# Patient Record
Sex: FEMALE | Race: WHITE | NOT HISPANIC OR LATINO | Employment: OTHER | URBAN - METROPOLITAN AREA
[De-identification: names, ages, dates, MRNs, and addresses within clinical notes are randomized per-mention and may not be internally consistent; named-entity substitution may affect disease eponyms.]

---

## 2023-03-23 LAB
ALBUMIN (G/DL) IN SER/PLAS: 4.3 G/DL (ref 3.4–5)
ANION GAP IN SER/PLAS: 15 MMOL/L (ref 10–20)
CALCIUM (MG/DL) IN SER/PLAS: 9.8 MG/DL (ref 8.6–10.3)
CARBON DIOXIDE, TOTAL (MMOL/L) IN SER/PLAS: 23 MMOL/L (ref 21–32)
CHLORIDE (MMOL/L) IN SER/PLAS: 106 MMOL/L (ref 98–107)
CREATININE (MG/DL) IN SER/PLAS: 1.13 MG/DL (ref 0.5–1.05)
GFR FEMALE: 51 ML/MIN/1.73M2
GLUCOSE (MG/DL) IN SER/PLAS: 103 MG/DL (ref 74–99)
PHOSPHATE (MG/DL) IN SER/PLAS: 4 MG/DL (ref 2.5–4.9)
POTASSIUM (MMOL/L) IN SER/PLAS: 4.2 MMOL/L (ref 3.5–5.3)
SODIUM (MMOL/L) IN SER/PLAS: 140 MMOL/L (ref 136–145)
UREA NITROGEN (MG/DL) IN SER/PLAS: 32 MG/DL (ref 6–23)

## 2023-04-12 ENCOUNTER — APPOINTMENT (OUTPATIENT)
Dept: PRIMARY CARE | Facility: CLINIC | Age: 73
End: 2023-04-12
Payer: MEDICARE

## 2023-04-12 PROBLEM — R06.02 SHORTNESS OF BREATH: Status: ACTIVE | Noted: 2023-04-12

## 2023-04-12 PROBLEM — M19.042 OSTEOARTHRITIS OF BOTH HANDS: Status: ACTIVE | Noted: 2023-04-12

## 2023-04-12 PROBLEM — M79.89 LEFT LEG SWELLING: Status: ACTIVE | Noted: 2023-04-12

## 2023-04-12 PROBLEM — R06.09 DYSPNEA ON EXERTION: Status: ACTIVE | Noted: 2023-04-12

## 2023-04-12 PROBLEM — E78.5 HYPERLIPIDEMIA: Status: ACTIVE | Noted: 2023-04-12

## 2023-04-12 PROBLEM — G47.33 OSA (OBSTRUCTIVE SLEEP APNEA): Status: ACTIVE | Noted: 2023-04-12

## 2023-04-12 PROBLEM — M16.11 PRIMARY OSTEOARTHRITIS OF RIGHT HIP: Status: ACTIVE | Noted: 2023-04-12

## 2023-04-12 PROBLEM — M25.559 JOINT PAIN, HIP: Status: ACTIVE | Noted: 2023-04-12

## 2023-04-12 PROBLEM — M85.80 OSTEOPENIA: Status: RESOLVED | Noted: 2023-04-12 | Resolved: 2023-04-12

## 2023-04-12 PROBLEM — R10.2 FEMALE PELVIC PAIN: Status: ACTIVE | Noted: 2023-04-12

## 2023-04-12 PROBLEM — N18.31 CHRONIC KIDNEY DISEASE, STAGE 3A (MULTI): Status: ACTIVE | Noted: 2023-04-12

## 2023-04-12 PROBLEM — H90.3 BILATERAL SENSORINEURAL HEARING LOSS: Status: ACTIVE | Noted: 2023-04-12

## 2023-04-12 PROBLEM — M35.00 SICCA SYNDROME (MULTI): Status: ACTIVE | Noted: 2023-04-12

## 2023-04-12 PROBLEM — I72.8 ANEURYSM OF SPLENIC ARTERY (CMS-HCC): Status: ACTIVE | Noted: 2023-04-12

## 2023-04-12 PROBLEM — E79.0 HYPERURICEMIA: Status: ACTIVE | Noted: 2023-04-12

## 2023-04-12 PROBLEM — M19.041 OSTEOARTHRITIS OF BOTH HANDS: Status: ACTIVE | Noted: 2023-04-12

## 2023-04-12 PROBLEM — I10 HYPERTENSION: Status: ACTIVE | Noted: 2023-04-12

## 2023-04-12 PROBLEM — R39.9 URINARY SYMPTOM OR SIGN: Status: ACTIVE | Noted: 2023-04-12

## 2023-04-12 PROBLEM — R74.01 ELEVATED TRANSAMINASE LEVEL: Status: ACTIVE | Noted: 2023-04-12

## 2023-04-12 PROBLEM — T78.40XA ALLERGIC: Status: ACTIVE | Noted: 2023-04-12

## 2023-04-12 PROBLEM — L82.1 SEBORRHEIC KERATOSES: Status: ACTIVE | Noted: 2023-04-12

## 2023-04-12 PROBLEM — R90.89 ABNORMAL FINDING ON MRI OF BRAIN: Status: ACTIVE | Noted: 2023-04-12

## 2023-04-12 PROBLEM — K22.2 ESOPHAGEAL STRICTURE: Status: ACTIVE | Noted: 2023-04-12

## 2023-04-12 PROBLEM — N89.8 VAGINAL IRRITATION: Status: ACTIVE | Noted: 2023-04-12

## 2023-04-12 PROBLEM — N95.2 POSTMENOPAUSAL ATROPHIC VAGINITIS: Status: ACTIVE | Noted: 2023-04-12

## 2023-04-12 PROBLEM — M41.9 SCOLIOSIS: Status: ACTIVE | Noted: 2023-04-12

## 2023-04-12 PROBLEM — R32 URINARY INCONTINENCE: Status: ACTIVE | Noted: 2023-04-12

## 2023-04-12 PROBLEM — M46.1 SACROILIITIS (CMS-HCC): Status: ACTIVE | Noted: 2023-04-12

## 2023-04-12 PROBLEM — R23.3 PETECHIAL RASH: Status: ACTIVE | Noted: 2023-04-12

## 2023-04-12 PROBLEM — D17.9 LIPOMA: Status: ACTIVE | Noted: 2023-04-12

## 2023-04-12 PROBLEM — J90 PLEURAL EFFUSION: Status: ACTIVE | Noted: 2023-04-12

## 2023-04-12 PROBLEM — J45.909 ASTHMA (HHS-HCC): Status: ACTIVE | Noted: 2023-04-12

## 2023-04-12 PROBLEM — J32.9 SINUSITIS: Status: ACTIVE | Noted: 2023-04-12

## 2023-04-12 PROBLEM — I89.0 LYMPHEDEMA OF EXTREMITY: Status: ACTIVE | Noted: 2023-04-12

## 2023-04-12 PROBLEM — M79.671 PAIN IN BOTH FEET: Status: ACTIVE | Noted: 2023-04-12

## 2023-04-12 PROBLEM — M79.672 PAIN IN BOTH FEET: Status: ACTIVE | Noted: 2023-04-12

## 2023-04-12 PROBLEM — I72.2 RENAL ARTERY ANEURYSM (CMS-HCC): Status: ACTIVE | Noted: 2023-04-12
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
## 2023-04-12 PROBLEM — H69.93 DYSFUNCTION OF BOTH EUSTACHIAN TUBES: Status: ACTIVE | Noted: 2023-04-12

## 2023-04-12 PROBLEM — D69.6 THROMBOCYTOPENIA (CMS-HCC): Status: ACTIVE | Noted: 2023-04-12

## 2023-04-12 PROBLEM — E55.9 MILD VITAMIN D DEFICIENCY: Status: ACTIVE | Noted: 2023-04-12

## 2023-04-12 PROBLEM — M79.606 LEG PAIN: Status: ACTIVE | Noted: 2023-04-12

## 2023-04-12 PROBLEM — L29.9 ITCHING: Status: ACTIVE | Noted: 2023-04-12

## 2023-04-12 PROBLEM — R68.2 DRY MOUTH, UNSPECIFIED: Status: ACTIVE | Noted: 2023-04-12

## 2023-04-12 PROBLEM — R42 DIZZINESS AND GIDDINESS: Status: ACTIVE | Noted: 2023-04-12

## 2023-04-12 RX ORDER — LOSARTAN POTASSIUM 50 MG/1
1 TABLET ORAL DAILY
COMMUNITY
Start: 2022-06-12 | End: 2023-05-03

## 2023-04-12 RX ORDER — AMLODIPINE BESYLATE 5 MG/1
1 TABLET ORAL DAILY
COMMUNITY
Start: 2018-09-18 | End: 2023-11-15

## 2023-04-12 RX ORDER — CYCLOSPORINE 0.5 MG/ML
1 EMULSION OPHTHALMIC EVERY 12 HOURS
COMMUNITY
Start: 2018-09-24

## 2023-04-12 RX ORDER — ATENOLOL AND CHLORTHALIDONE TABLET 100; 25 MG/1; MG/1
1 TABLET ORAL DAILY
COMMUNITY
Start: 2018-09-18 | End: 2023-08-15

## 2023-04-12 RX ORDER — ACETAMINOPHEN 500 MG
1 TABLET ORAL DAILY
COMMUNITY
Start: 2013-06-28

## 2023-04-12 RX ORDER — FLUTICASONE PROPIONATE 50 MCG
SPRAY, SUSPENSION (ML) NASAL DAILY
COMMUNITY
Start: 2019-04-30 | End: 2023-05-22 | Stop reason: ALTCHOICE

## 2023-04-12 RX ORDER — UBIDECARENONE 30 MG
1 CAPSULE ORAL DAILY
COMMUNITY
Start: 2018-09-18

## 2023-04-12 RX ORDER — MONTELUKAST SODIUM 10 MG/1
1 TABLET ORAL NIGHTLY
COMMUNITY
Start: 2020-06-25 | End: 2024-02-12

## 2023-04-12 RX ORDER — GLUCOSAMINE/CHONDR SU A SOD 750-600 MG
TABLET ORAL
COMMUNITY
Start: 2018-09-18

## 2023-04-12 RX ORDER — ALLOPURINOL 100 MG/1
100 TABLET ORAL DAILY
COMMUNITY
End: 2023-12-19

## 2023-04-12 RX ORDER — ASPIRIN 81 MG/1
1 TABLET ORAL DAILY
COMMUNITY
Start: 2018-09-18

## 2023-04-19 ENCOUNTER — APPOINTMENT (OUTPATIENT)
Dept: PRIMARY CARE | Facility: CLINIC | Age: 73
End: 2023-04-19
Payer: MEDICARE

## 2023-05-02 DIAGNOSIS — I10 HYPERTENSION, UNSPECIFIED TYPE: ICD-10-CM

## 2023-05-03 RX ORDER — LOSARTAN POTASSIUM 50 MG/1
TABLET ORAL
Qty: 90 TABLET | Refills: 1 | Status: SHIPPED | OUTPATIENT
Start: 2023-05-03 | End: 2023-09-27 | Stop reason: ALTCHOICE

## 2023-05-03 NOTE — TELEPHONE ENCOUNTER
Requested Prescriptions     Pending Prescriptions Disp Refills    losartan (Cozaar) 50 mg tablet [Pharmacy Med Name: Losartan Potassium Oral Tablet 50 MG] 90 tablet 1     Sig: TAKE ONE TABLET BY MOUTH EVERY DAY

## 2023-05-22 ENCOUNTER — OFFICE VISIT (OUTPATIENT)
Dept: PRIMARY CARE | Facility: CLINIC | Age: 73
End: 2023-05-22
Payer: MEDICARE

## 2023-05-22 VITALS
BODY MASS INDEX: 39.21 KG/M2 | HEART RATE: 69 BPM | SYSTOLIC BLOOD PRESSURE: 139 MMHG | HEIGHT: 66 IN | WEIGHT: 244 LBS | DIASTOLIC BLOOD PRESSURE: 82 MMHG | OXYGEN SATURATION: 94 %

## 2023-05-22 DIAGNOSIS — I10 PRIMARY HYPERTENSION: ICD-10-CM

## 2023-05-22 DIAGNOSIS — R07.81 PLEURITIC CHEST PAIN: ICD-10-CM

## 2023-05-22 DIAGNOSIS — E78.5 HYPERLIPIDEMIA, UNSPECIFIED HYPERLIPIDEMIA TYPE: ICD-10-CM

## 2023-05-22 DIAGNOSIS — Z00.00 ROUTINE GENERAL MEDICAL EXAMINATION AT HEALTH CARE FACILITY: Primary | ICD-10-CM

## 2023-05-22 DIAGNOSIS — Z12.31 ENCOUNTER FOR SCREENING MAMMOGRAM FOR MALIGNANT NEOPLASM OF BREAST: ICD-10-CM

## 2023-05-22 DIAGNOSIS — J90 PLEURAL EFFUSION: ICD-10-CM

## 2023-05-22 PROBLEM — M19.011 PRIMARY OSTEOARTHRITIS, RIGHT SHOULDER: Status: ACTIVE | Noted: 2023-05-22

## 2023-05-22 PROBLEM — G56.02 CARPAL TUNNEL SYNDROME, LEFT UPPER LIMB: Status: ACTIVE | Noted: 2023-05-22

## 2023-05-22 PROCEDURE — 99214 OFFICE O/P EST MOD 30 MIN: CPT | Performed by: INTERNAL MEDICINE

## 2023-05-22 PROCEDURE — G0439 PPPS, SUBSEQ VISIT: HCPCS | Performed by: INTERNAL MEDICINE

## 2023-05-22 PROCEDURE — 1036F TOBACCO NON-USER: CPT | Performed by: INTERNAL MEDICINE

## 2023-05-22 PROCEDURE — 1170F FXNL STATUS ASSESSED: CPT | Performed by: INTERNAL MEDICINE

## 2023-05-22 PROCEDURE — 1159F MED LIST DOCD IN RCRD: CPT | Performed by: INTERNAL MEDICINE

## 2023-05-22 PROCEDURE — 1160F RVW MEDS BY RX/DR IN RCRD: CPT | Performed by: INTERNAL MEDICINE

## 2023-05-22 PROCEDURE — 3079F DIAST BP 80-89 MM HG: CPT | Performed by: INTERNAL MEDICINE

## 2023-05-22 PROCEDURE — 3075F SYST BP GE 130 - 139MM HG: CPT | Performed by: INTERNAL MEDICINE

## 2023-05-22 RX ORDER — NEBULIZER AND COMPRESSOR
1 EACH MISCELLANEOUS 2 TIMES DAILY
Qty: 1 EACH | Refills: 0 | Status: SHIPPED | OUTPATIENT
Start: 2023-05-22

## 2023-05-22 RX ORDER — PREDNISONE 20 MG/1
20 TABLET ORAL 2 TIMES DAILY
Qty: 10 TABLET | Refills: 0 | Status: SHIPPED | OUTPATIENT
Start: 2023-05-22 | End: 2023-05-27

## 2023-05-22 RX ORDER — PREDNISONE 20 MG/1
20 TABLET ORAL 2 TIMES DAILY
Qty: 10 TABLET | Refills: 0 | Status: SHIPPED | OUTPATIENT
Start: 2023-05-22 | End: 2023-05-22 | Stop reason: SDUPTHER

## 2023-05-22 ASSESSMENT — ACTIVITIES OF DAILY LIVING (ADL)
TAKING_MEDICATION: INDEPENDENT
MANAGING_FINANCES: INDEPENDENT
BATHING: INDEPENDENT
DRESSING: INDEPENDENT
GROCERY_SHOPPING: INDEPENDENT
DOING_HOUSEWORK: INDEPENDENT

## 2023-05-22 ASSESSMENT — PATIENT HEALTH QUESTIONNAIRE - PHQ9
1. LITTLE INTEREST OR PLEASURE IN DOING THINGS: NOT AT ALL
SUM OF ALL RESPONSES TO PHQ9 QUESTIONS 1 AND 2: 0
2. FEELING DOWN, DEPRESSED OR HOPELESS: NOT AT ALL

## 2023-05-22 NOTE — PROGRESS NOTES
"Subjective   Patient ID: Patty Johnson is a 73 y.o. female who presents for Medicare Annual Wellness Visit Subsequent. Concerns with pain on left side     HPI     Has pain in left chest, back, shoulder since Friday. Worse taking a deep breath. No relieving factors. No injury, rash or bruising.     Denies any other new issues     Review of Systems   All other systems reviewed and are negative.      Objective   /82   Pulse 69   Ht 1.664 m (5' 5.5\")   Wt 111 kg (244 lb)   SpO2 94%   BMI 39.99 kg/m²     Physical Exam  Constitutional:       Appearance: Normal appearance.   HENT:      Head: Normocephalic and atraumatic.   Cardiovascular:      Rate and Rhythm: Normal rate and regular rhythm.      Heart sounds: Normal heart sounds. No murmur heard.     No gallop.   Pulmonary:      Effort: Pulmonary effort is normal. No respiratory distress.      Breath sounds: No wheezing or rales.   Skin:     General: Skin is warm and dry.      Findings: No rash.   Neurological:      Mental Status: She is alert and oriented to person, place, and time. Mental status is at baseline.   Psychiatric:         Mood and Affect: Mood normal.         Behavior: Behavior normal.         Assessment/Plan       #left thoracic pain   -?pleurisy. Order CXR and will rx prednisone 20mg BID x 5 days      #HTN  -Acceptable here. BP is high at home, she has been using a wrist cuff. Recommend she get an upper arm cuff and call me with readings. Cont losartan amlodipine, atenolol and chlorthalidone     #CKD 3  -Following with nephrology.     #Urinary incontinence   -Following with Dr. Vaughan      #Asthma  -Cont singular. Follows with pulm      #HLD  -Cont simvastatin, order lipid panel      #CELIA  -Cont CPAP        Mamm: 4/26/22  CRC: FIT 8/3/21 - cscope ~2-3 years ago   DEXA:10/19/20     RTC 6 mo        "

## 2023-05-22 NOTE — PATIENT INSTRUCTIONS
Go get xray and take prednisone     Get lipids checked at your leisure. Please complete fasting blood work. Fast for 10 hours, black coffee and water the morning of labs are OK.     If you can get upper arm cuff.     Get mamm [phone]    Come back in 6 months

## 2023-05-24 DIAGNOSIS — J90 PLEURAL EFFUSION: Primary | ICD-10-CM

## 2023-06-19 ENCOUNTER — LAB (OUTPATIENT)
Dept: LAB | Facility: LAB | Age: 73
End: 2023-06-19
Payer: MEDICARE

## 2023-06-19 DIAGNOSIS — E78.5 HYPERLIPIDEMIA, UNSPECIFIED HYPERLIPIDEMIA TYPE: ICD-10-CM

## 2023-06-19 LAB
CHOLESTEROL (MG/DL) IN SER/PLAS: 132 MG/DL (ref 0–199)
CHOLESTEROL IN HDL (MG/DL) IN SER/PLAS: 36 MG/DL
CHOLESTEROL/HDL RATIO: 3.7
LDL: 66 MG/DL (ref 0–99)
TRIGLYCERIDE (MG/DL) IN SER/PLAS: 148 MG/DL (ref 0–149)
VLDL: 30 MG/DL (ref 0–40)

## 2023-06-19 PROCEDURE — 80061 LIPID PANEL: CPT

## 2023-06-19 PROCEDURE — 36415 COLL VENOUS BLD VENIPUNCTURE: CPT

## 2023-06-21 ENCOUNTER — TELEPHONE (OUTPATIENT)
Dept: PRIMARY CARE | Facility: CLINIC | Age: 73
End: 2023-06-21
Payer: MEDICARE

## 2023-06-21 DIAGNOSIS — I72.8 SPLENIC ARTERY ANEURYSM (CMS-HCC): Primary | ICD-10-CM

## 2023-08-06 DIAGNOSIS — E78.5 HYPERLIPIDEMIA, UNSPECIFIED HYPERLIPIDEMIA TYPE: ICD-10-CM

## 2023-08-06 DIAGNOSIS — K21.9 GASTROESOPHAGEAL REFLUX DISEASE, UNSPECIFIED WHETHER ESOPHAGITIS PRESENT: ICD-10-CM

## 2023-08-07 RX ORDER — SIMVASTATIN 20 MG/1
TABLET, FILM COATED ORAL
Qty: 90 TABLET | Refills: 3 | Status: SHIPPED | OUTPATIENT
Start: 2023-08-07 | End: 2024-02-09

## 2023-08-07 RX ORDER — OMEPRAZOLE 40 MG/1
CAPSULE, DELAYED RELEASE ORAL
Qty: 180 CAPSULE | Refills: 3 | Status: SHIPPED | OUTPATIENT
Start: 2023-08-07 | End: 2024-02-09

## 2023-08-07 NOTE — TELEPHONE ENCOUNTER
Requested Prescriptions     Pending Prescriptions Disp Refills    simvastatin (Zocor) 20 mg tablet [Pharmacy Med Name: Simvastatin 20 MG Oral Tablet] 90 tablet 3     Sig: TAKE 1 TABLET BY MOUTH IN THE  EVENING    omeprazole (PriLOSEC) 40 mg DR capsule [Pharmacy Med Name: Omeprazole 40 MG Oral Capsule Delayed Release] 180 capsule 3     Sig: TAKE 1 CAPSULE BY MOUTH TWICE  DAILY

## 2023-08-13 DIAGNOSIS — I10 PRIMARY HYPERTENSION: Primary | ICD-10-CM

## 2023-08-15 RX ORDER — ATENOLOL AND CHLORTHALIDONE TABLET 100; 25 MG/1; MG/1
1 TABLET ORAL DAILY
Qty: 100 TABLET | Refills: 2 | Status: SHIPPED | OUTPATIENT
Start: 2023-08-15 | End: 2024-02-09

## 2023-08-16 DIAGNOSIS — N28.89 RIGHT RENAL MASS: Primary | ICD-10-CM

## 2023-08-23 ENCOUNTER — TELEPHONE (OUTPATIENT)
Dept: PRIMARY CARE | Facility: CLINIC | Age: 73
End: 2023-08-23
Payer: MEDICARE

## 2023-08-24 ENCOUNTER — APPOINTMENT (OUTPATIENT)
Dept: PRIMARY CARE | Facility: CLINIC | Age: 73
End: 2023-08-24
Payer: MEDICARE

## 2023-08-28 ENCOUNTER — HOSPITAL ENCOUNTER (OUTPATIENT)
Dept: DATA CONVERSION | Facility: HOSPITAL | Age: 73
End: 2023-08-29
Attending: ORTHOPAEDIC SURGERY | Admitting: ORTHOPAEDIC SURGERY
Payer: MEDICARE

## 2023-08-28 DIAGNOSIS — G47.33 OBSTRUCTIVE SLEEP APNEA (ADULT) (PEDIATRIC): ICD-10-CM

## 2023-08-28 DIAGNOSIS — I12.9 HYPERTENSIVE CHRONIC KIDNEY DISEASE WITH STAGE 1 THROUGH STAGE 4 CHRONIC KIDNEY DISEASE, OR UNSPECIFIED CHRONIC KIDNEY DISEASE: ICD-10-CM

## 2023-08-28 DIAGNOSIS — N18.30 CHRONIC KIDNEY DISEASE, STAGE 3 UNSPECIFIED (MULTI): ICD-10-CM

## 2023-08-28 DIAGNOSIS — J45.909 UNSPECIFIED ASTHMA, UNCOMPLICATED (HHS-HCC): ICD-10-CM

## 2023-08-28 DIAGNOSIS — M16.12 UNILATERAL PRIMARY OSTEOARTHRITIS, LEFT HIP: ICD-10-CM

## 2023-08-29 LAB
ANION GAP IN SER/PLAS: 12 MMOL/L (ref 10–20)
CALCIUM (MG/DL) IN SER/PLAS: 8.7 MG/DL (ref 8.6–10.3)
CARBON DIOXIDE, TOTAL (MMOL/L) IN SER/PLAS: 22 MMOL/L (ref 21–32)
CHLORIDE (MMOL/L) IN SER/PLAS: 103 MMOL/L (ref 98–107)
CREATININE (MG/DL) IN SER/PLAS: 1.07 MG/DL (ref 0.5–1.05)
ERYTHROCYTE DISTRIBUTION WIDTH (RATIO) BY AUTOMATED COUNT: 13.2 % (ref 11.5–14.5)
ERYTHROCYTE MEAN CORPUSCULAR HEMOGLOBIN CONCENTRATION (G/DL) BY AUTOMATED: 33.4 G/DL (ref 32–36)
ERYTHROCYTE MEAN CORPUSCULAR VOLUME (FL) BY AUTOMATED COUNT: 99 FL (ref 80–100)
ERYTHROCYTES (10*6/UL) IN BLOOD BY AUTOMATED COUNT: 3.07 X10E12/L (ref 4–5.2)
GFR FEMALE: 54 ML/MIN/1.73M2
GLUCOSE (MG/DL) IN SER/PLAS: 121 MG/DL (ref 74–99)
HEMATOCRIT (%) IN BLOOD BY AUTOMATED COUNT: 30.5 % (ref 36–46)
HEMOGLOBIN (G/DL) IN BLOOD: 10.2 G/DL (ref 12–16)
LEUKOCYTES (10*3/UL) IN BLOOD BY AUTOMATED COUNT: 5.4 X10E9/L (ref 4.4–11.3)
PLATELETS (10*3/UL) IN BLOOD AUTOMATED COUNT: 106 X10E9/L (ref 150–450)
POTASSIUM (MMOL/L) IN SER/PLAS: 3.7 MMOL/L (ref 3.5–5.3)
SODIUM (MMOL/L) IN SER/PLAS: 133 MMOL/L (ref 136–145)
UREA NITROGEN (MG/DL) IN SER/PLAS: 26 MG/DL (ref 6–23)

## 2023-08-30 ENCOUNTER — NURSING HOME VISIT (OUTPATIENT)
Dept: POST ACUTE CARE | Facility: EXTERNAL LOCATION | Age: 73
End: 2023-08-30
Payer: MEDICARE

## 2023-08-30 DIAGNOSIS — I10 PRIMARY HYPERTENSION: ICD-10-CM

## 2023-08-30 DIAGNOSIS — M16.12 PRIMARY OSTEOARTHRITIS OF LEFT HIP: Primary | ICD-10-CM

## 2023-08-30 DIAGNOSIS — E78.5 HYPERLIPIDEMIA, UNSPECIFIED HYPERLIPIDEMIA TYPE: ICD-10-CM

## 2023-08-30 DIAGNOSIS — K59.00 CONSTIPATION, UNSPECIFIED CONSTIPATION TYPE: ICD-10-CM

## 2023-08-30 DIAGNOSIS — M35.00 SICCA SYNDROME (MULTI): ICD-10-CM

## 2023-08-30 DIAGNOSIS — J45.909 ASTHMA, UNSPECIFIED ASTHMA SEVERITY, UNSPECIFIED WHETHER COMPLICATED, UNSPECIFIED WHETHER PERSISTENT (HHS-HCC): ICD-10-CM

## 2023-08-30 DIAGNOSIS — J31.0 RHINITIS, UNSPECIFIED TYPE: ICD-10-CM

## 2023-08-30 DIAGNOSIS — E56.9 VITAMIN DEFICIENCY: ICD-10-CM

## 2023-08-30 DIAGNOSIS — R11.0 NAUSEA: ICD-10-CM

## 2023-08-30 LAB
ALANINE AMINOTRANSFERASE (SGPT) (U/L) IN SER/PLAS: 14 U/L (ref 7–45)
ALBUMIN (G/DL) IN SER/PLAS: 3.6 G/DL (ref 3.4–5)
ALKALINE PHOSPHATASE (U/L) IN SER/PLAS: 50 U/L (ref 33–136)
ANION GAP IN SER/PLAS: 14 MMOL/L (ref 10–20)
ASPARTATE AMINOTRANSFERASE (SGOT) (U/L) IN SER/PLAS: 26 U/L (ref 9–39)
BASOPHILS (10*3/UL) IN BLOOD BY AUTOMATED COUNT: 0.03 X10E9/L (ref 0–0.1)
BASOPHILS/100 LEUKOCYTES IN BLOOD BY AUTOMATED COUNT: 0.6 % (ref 0–2)
BILIRUBIN TOTAL (MG/DL) IN SER/PLAS: 0.8 MG/DL (ref 0–1.2)
CALCIUM (MG/DL) IN SER/PLAS: 8.7 MG/DL (ref 8.6–10.3)
CARBON DIOXIDE, TOTAL (MMOL/L) IN SER/PLAS: 22 MMOL/L (ref 21–32)
CHLORIDE (MMOL/L) IN SER/PLAS: 103 MMOL/L (ref 98–107)
CREATININE (MG/DL) IN SER/PLAS: 1.33 MG/DL (ref 0.5–1.05)
DACROCYTES PRESENCE IN BLOOD BY LIGHT MICROSCOPY: NORMAL
EOSINOPHILS (10*3/UL) IN BLOOD BY AUTOMATED COUNT: 0.29 X10E9/L (ref 0–0.4)
EOSINOPHILS/100 LEUKOCYTES IN BLOOD BY AUTOMATED COUNT: 6 % (ref 0–6)
ERYTHROCYTE DISTRIBUTION WIDTH (RATIO) BY AUTOMATED COUNT: 13.2 % (ref 11.5–14.5)
ERYTHROCYTE MEAN CORPUSCULAR HEMOGLOBIN CONCENTRATION (G/DL) BY AUTOMATED: 33.4 G/DL (ref 32–36)
ERYTHROCYTE MEAN CORPUSCULAR VOLUME (FL) BY AUTOMATED COUNT: 99 FL (ref 80–100)
ERYTHROCYTES (10*6/UL) IN BLOOD BY AUTOMATED COUNT: 2.96 X10E12/L (ref 4–5.2)
GFR FEMALE: 42 ML/MIN/1.73M2
GLUCOSE (MG/DL) IN SER/PLAS: 160 MG/DL (ref 74–99)
HEMATOCRIT (%) IN BLOOD BY AUTOMATED COUNT: 29.3 % (ref 36–46)
HEMOGLOBIN (G/DL) IN BLOOD: 9.8 G/DL (ref 12–16)
IMMATURE GRANULOCYTES/100 LEUKOCYTES IN BLOOD BY AUTOMATED COUNT: 0.2 % (ref 0–0.9)
LEUKOCYTES (10*3/UL) IN BLOOD BY AUTOMATED COUNT: 4.8 X10E9/L (ref 4.4–11.3)
LYMPHOCYTES (10*3/UL) IN BLOOD BY AUTOMATED COUNT: 1.02 X10E9/L (ref 0.8–3)
LYMPHOCYTES/100 LEUKOCYTES IN BLOOD BY AUTOMATED COUNT: 21.1 % (ref 13–44)
MONOCYTES (10*3/UL) IN BLOOD BY AUTOMATED COUNT: 0.44 X10E9/L (ref 0.05–0.8)
MONOCYTES/100 LEUKOCYTES IN BLOOD BY AUTOMATED COUNT: 9.1 % (ref 2–10)
NEUTROPHILS (10*3/UL) IN BLOOD BY AUTOMATED COUNT: 3.05 X10E9/L (ref 1.6–5.5)
NEUTROPHILS/100 LEUKOCYTES IN BLOOD BY AUTOMATED COUNT: 63 % (ref 40–80)
OVALOCYTES PRESENCE IN BLOOD BY LIGHT MICROSCOPY: NORMAL
PLATELETS (10*3/UL) IN BLOOD AUTOMATED COUNT: 98 X10E9/L (ref 150–450)
POTASSIUM (MMOL/L) IN SER/PLAS: 3.8 MMOL/L (ref 3.5–5.3)
PROTEIN TOTAL: 6.4 G/DL (ref 6.4–8.2)
RBC MORPHOLOGY IN BLOOD: NORMAL
SODIUM (MMOL/L) IN SER/PLAS: 135 MMOL/L (ref 136–145)
UREA NITROGEN (MG/DL) IN SER/PLAS: 24 MG/DL (ref 6–23)

## 2023-08-30 PROCEDURE — 99310 SBSQ NF CARE HIGH MDM 45: CPT | Performed by: NURSE PRACTITIONER

## 2023-08-31 ENCOUNTER — NURSING HOME VISIT (OUTPATIENT)
Dept: POST ACUTE CARE | Facility: EXTERNAL LOCATION | Age: 73
End: 2023-08-31
Payer: MEDICARE

## 2023-08-31 DIAGNOSIS — M16.12 PRIMARY OSTEOARTHRITIS OF LEFT HIP: ICD-10-CM

## 2023-08-31 DIAGNOSIS — I10 PRIMARY HYPERTENSION: ICD-10-CM

## 2023-08-31 DIAGNOSIS — E78.00 ELEVATED SERUM CHOLESTEROL: ICD-10-CM

## 2023-08-31 PROCEDURE — 99305 1ST NF CARE MODERATE MDM 35: CPT | Performed by: FAMILY MEDICINE

## 2023-08-31 NOTE — LETTER
Patient: Patty Johnson  : 1950    Encounter Date: 2023    Patty Johnson is a 73 y.o. female with Chief Complaint of No chief complaint on file..    Resident seen 23 -- MAGY    CC: [facility] ([place]) H&P    : 1950  [facility] H&P done 23  Allergy: ACEi (Benazepril), Vancomycin, Sulfa (Bactrim), ARB (Benicar), Plaquenil.  FULL CODE    S: 72 yo woman with HTN, CKD3, and hip OA admitted to SNF rehab after hospitalization for elective Left NARESH complicated by nausea and vomiting. No cp/sob. Med list & problem list reviewed.    O: VSS AFEB 250# Awake, alert, NAD. Chest cta. Heart rrr. Ext no c/c/e. Left posterior thigh incision c/d/i.    A/P:  # Left hip OA s/p NARESH: norco prn pain. Routine tylenol. Add ibuprofen 600 mg with food qam and bid prn pain. Ice. SNF PT/OT.  # Nausea/GERD: PPI, zofran prn.  # HTN: norvasc 5, Tenoretic 100/25, losartan 50  # HLD: atorvastatin 10    Past Surgical History:   Procedure Laterality Date   • CT ABDOMEN PELVIS ANGIOGRAM W AND/OR WO IV CONTRAST  2014    CT ABDOMEN PELVIS ANGIOGRAM W AND/OR WO IV CONTRAST 2014 [facility]   • CT ABDOMEN PELVIS ANGIOGRAM W AND/OR WO IV CONTRAST  2018    CT ABDOMEN PELVIS ANGIOGRAM W AND/OR WO IV CONTRAST 2018 [facility]   • CT ABDOMEN PELVIS ANGIOGRAM W AND/OR WO IV CONTRAST  2019    CT ABDOMEN PELVIS ANGIOGRAM W AND/OR WO IV CONTRAST 2019 [facility]   • CT ABDOMEN PELVIS ANGIOGRAM W AND/OR WO IV CONTRAST  2016    CT ABDOMEN PELVIS ANGIOGRAM W AND/OR WO IV CONTRAST 2016 [facility]   • CT ABDOMEN PELVIS ANGIOGRAM W AND/OR WO IV CONTRAST  2023    CT ABDOMEN PELVIS ANGIOGRAM W AND/OR WO IV CONTRAST 2023 [facility] CT   • OTHER SURGICAL HISTORY  2020    Cataract surgery   • OTHER SURGICAL HISTORY  2020    Elbow surgery   • OTHER SURGICAL HISTORY  2020    Tonsillectomy   • OTHER SURGICAL HISTORY  2018    Esophageal Dilation   • TOTAL HIP  ARTHROPLASTY  09/08/2018    Total Hip Replacement      Social History     Socioeconomic History   • Marital status:      Spouse name: Not on file   • Number of children: Not on file   • Years of education: Not on file   • Highest education level: Not on file   Occupational History   • Not on file   Tobacco Use   • Smoking status: Never   • Smokeless tobacco: Never   Vaping Use   • Vaping Use: Never used   Substance and Sexual Activity   • Alcohol use: Not on file   • Drug use: Not on file   • Sexual activity: Not on file   Other Topics Concern   • Not on file   Social History Narrative   • Not on file     Social Determinants of Health     Financial Resource Strain: Not on file   Food Insecurity: Not on file   Transportation Needs: Not on file   Physical Activity: Not on file   Stress: Not on file   Social Connections: Not on file   Intimate Partner Violence: Not on file   Housing Stability: Not on file     Past Medical History:   Diagnosis Date   • Personal history of other diseases of the musculoskeletal system and connective tissue     History of arthritis   • Personal history of other diseases of the respiratory system     History of asthma      Family History   Problem Relation Name Age of Onset   • Stroke Mother          cerebrovascular accident (CVA)   • Heart block Father     • Esophageal cancer Brother     • Cancer Other     • Hypertension Other          Review of Systems   Constitutional:  Negative for chills, fatigue and fever.   HENT:  Negative for rhinorrhea and sore throat.    Eyes:  Negative for pain and redness.   Respiratory:  Negative for cough and shortness of breath.    Cardiovascular:  Negative for chest pain and palpitations.   Gastrointestinal:  Negative for abdominal pain and blood in stool.   Endocrine: Negative for polydipsia and polyuria.   Genitourinary:  Negative for dysuria and hematuria.   Musculoskeletal:  Positive for arthralgias. Negative for back pain and neck stiffness.    Skin:  Negative for rash and wound.   Allergic/Immunologic: Negative for environmental allergies and food allergies.   Neurological:  Positive for weakness. Negative for headaches.   Hematological:  Negative for adenopathy. Does not bruise/bleed easily.   Psychiatric/Behavioral:  Negative for hallucinations and suicidal ideas.       There were no vitals taken for this visit.  Physical Exam  Vitals reviewed.   Constitutional:       General: She is not in acute distress.     Appearance: She is not ill-appearing.   HENT:      Head: Normocephalic and atraumatic.      Right Ear: Tympanic membrane normal.      Left Ear: Tympanic membrane normal.      Nose: No congestion or rhinorrhea.      Mouth/Throat:      Pharynx: No oropharyngeal exudate or posterior oropharyngeal erythema.   Eyes:      Extraocular Movements: Extraocular movements intact.      Conjunctiva/sclera: Conjunctivae normal.      Pupils: Pupils are equal, round, and reactive to light.   Cardiovascular:      Rate and Rhythm: Normal rate and regular rhythm.      Heart sounds: No murmur heard.     No friction rub. No gallop.   Pulmonary:      Effort: Pulmonary effort is normal.      Breath sounds: Normal breath sounds. No wheezing, rhonchi or rales.   Abdominal:      General: There is no distension.      Palpations: Abdomen is soft.      Tenderness: There is no abdominal tenderness. There is no guarding or rebound.   Musculoskeletal:         General: No swelling or deformity.      Cervical back: Normal range of motion and neck supple.      Right lower leg: No edema.      Left lower leg: No edema.   Skin:     Capillary Refill: Capillary refill takes less than 2 seconds.      Coloration: Skin is not jaundiced.      Findings: No rash.      Comments: Left thigh incision c/d/i   Neurological:      General: No focal deficit present.      Mental Status: She is alert.      Motor: Weakness present.   Psychiatric:         Mood and Affect: Mood normal.         Behavior:  "Behavior normal.       Lab Results   Component Value Date    WBC 4.8 08/30/2023    HGB 9.8 (L) 08/30/2023    HCT 29.3 (L) 08/30/2023    MCV 99 08/30/2023    PLT 98 (L) 08/30/2023     Lab Results   Component Value Date    CHOL 132 06/19/2023    CHOL 109 06/07/2022    CHOL 125 01/08/2021     Lab Results   Component Value Date    HDL 36.0 (A) 06/19/2023    HDL 28.5 (A) 06/07/2022    HDL 32.8 (A) 01/08/2021     No results found for: \"LDLCALC\"  Lab Results   Component Value Date    TRIG 148 06/19/2023    TRIG 106 06/07/2022    TRIG 147 01/08/2021     No components found for: \"CHOLHDL\"  Lab Results   Component Value Date    HGBA1C 5.8 07/01/2021       Assessment/Plan  Problem List Items Addressed This Visit       Hyperlipidemia    Hypertension    Primary osteoarthritis of left hip         Electronically Signed By: Gustabo Shaw MD   9/3/23  4:52 PM  "

## 2023-08-31 NOTE — PROGRESS NOTES
Patty Johnson is a 73 y.o. female with Chief Complaint of No chief complaint on file..    Resident seen 23 -- MP    CC: [facility] ([place]) H&P    : 1950  [facility] H&P done 23  Allergy: ACEi (Benazepril), Vancomycin, Sulfa (Bactrim), ARB (Benicar), Plaquenil.  FULL CODE    S: 74 yo woman with HTN, CKD3, and hip OA admitted to SNF rehab after hospitalization for elective Left NARESH complicated by nausea and vomiting. No cp/sob. Med list & problem list reviewed.    O: VSS AFEB 250# Awake, alert, NAD. Chest cta. Heart rrr. Ext no c/c/e. Left posterior thigh incision c/d/i.    A/P:  # Left hip OA s/p NARESH: norco prn pain. Routine tylenol. Add ibuprofen 600 mg with food qam and bid prn pain. Ice. SNF PT/OT.  # Nausea/GERD: PPI, zofran prn.  # HTN: norvasc 5, Tenoretic 100/25, losartan 50  # HLD: atorvastatin 10    Past Surgical History:   Procedure Laterality Date    CT ABDOMEN PELVIS ANGIOGRAM W AND/OR WO IV CONTRAST  2014    CT ABDOMEN PELVIS ANGIOGRAM W AND/OR WO IV CONTRAST 2014 [facility]    CT ABDOMEN PELVIS ANGIOGRAM W AND/OR WO IV CONTRAST  2018    CT ABDOMEN PELVIS ANGIOGRAM W AND/OR WO IV CONTRAST 2018 [facility]    CT ABDOMEN PELVIS ANGIOGRAM W AND/OR WO IV CONTRAST  2019    CT ABDOMEN PELVIS ANGIOGRAM W AND/OR WO IV CONTRAST 2019 [facility]    CT ABDOMEN PELVIS ANGIOGRAM W AND/OR WO IV CONTRAST  2016    CT ABDOMEN PELVIS ANGIOGRAM W AND/OR WO IV CONTRAST 2016 [facility]    CT ABDOMEN PELVIS ANGIOGRAM W AND/OR WO IV CONTRAST  2023    CT ABDOMEN PELVIS ANGIOGRAM W AND/OR WO IV CONTRAST 2023 [facility] CT    OTHER SURGICAL HISTORY  2020    Cataract surgery    OTHER SURGICAL HISTORY  2020    Elbow surgery    OTHER SURGICAL HISTORY  2020    Tonsillectomy    OTHER SURGICAL HISTORY  2018    Esophageal Dilation    TOTAL HIP ARTHROPLASTY  2018    Total Hip Replacement      Social History      Socioeconomic History    Marital status:      Spouse name: Not on file    Number of children: Not on file    Years of education: Not on file    Highest education level: Not on file   Occupational History    Not on file   Tobacco Use    Smoking status: Never    Smokeless tobacco: Never   Vaping Use    Vaping Use: Never used   Substance and Sexual Activity    Alcohol use: Not on file    Drug use: Not on file    Sexual activity: Not on file   Other Topics Concern    Not on file   Social History Narrative    Not on file     Social Determinants of Health     Financial Resource Strain: Not on file   Food Insecurity: Not on file   Transportation Needs: Not on file   Physical Activity: Not on file   Stress: Not on file   Social Connections: Not on file   Intimate Partner Violence: Not on file   Housing Stability: Not on file     Past Medical History:   Diagnosis Date    Personal history of other diseases of the musculoskeletal system and connective tissue     History of arthritis    Personal history of other diseases of the respiratory system     History of asthma      Family History   Problem Relation Name Age of Onset    Stroke Mother          cerebrovascular accident (CVA)    Heart block Father      Esophageal cancer Brother      Cancer Other      Hypertension Other          Review of Systems   Constitutional:  Negative for chills, fatigue and fever.   HENT:  Negative for rhinorrhea and sore throat.    Eyes:  Negative for pain and redness.   Respiratory:  Negative for cough and shortness of breath.    Cardiovascular:  Negative for chest pain and palpitations.   Gastrointestinal:  Negative for abdominal pain and blood in stool.   Endocrine: Negative for polydipsia and polyuria.   Genitourinary:  Negative for dysuria and hematuria.   Musculoskeletal:  Positive for arthralgias. Negative for back pain and neck stiffness.   Skin:  Negative for rash and wound.   Allergic/Immunologic: Negative for environmental  allergies and food allergies.   Neurological:  Positive for weakness. Negative for headaches.   Hematological:  Negative for adenopathy. Does not bruise/bleed easily.   Psychiatric/Behavioral:  Negative for hallucinations and suicidal ideas.       There were no vitals taken for this visit.  Physical Exam  Vitals reviewed.   Constitutional:       General: She is not in acute distress.     Appearance: She is not ill-appearing.   HENT:      Head: Normocephalic and atraumatic.      Right Ear: Tympanic membrane normal.      Left Ear: Tympanic membrane normal.      Nose: No congestion or rhinorrhea.      Mouth/Throat:      Pharynx: No oropharyngeal exudate or posterior oropharyngeal erythema.   Eyes:      Extraocular Movements: Extraocular movements intact.      Conjunctiva/sclera: Conjunctivae normal.      Pupils: Pupils are equal, round, and reactive to light.   Cardiovascular:      Rate and Rhythm: Normal rate and regular rhythm.      Heart sounds: No murmur heard.     No friction rub. No gallop.   Pulmonary:      Effort: Pulmonary effort is normal.      Breath sounds: Normal breath sounds. No wheezing, rhonchi or rales.   Abdominal:      General: There is no distension.      Palpations: Abdomen is soft.      Tenderness: There is no abdominal tenderness. There is no guarding or rebound.   Musculoskeletal:         General: No swelling or deformity.      Cervical back: Normal range of motion and neck supple.      Right lower leg: No edema.      Left lower leg: No edema.   Skin:     Capillary Refill: Capillary refill takes less than 2 seconds.      Coloration: Skin is not jaundiced.      Findings: No rash.      Comments: Left thigh incision c/d/i   Neurological:      General: No focal deficit present.      Mental Status: She is alert.      Motor: Weakness present.   Psychiatric:         Mood and Affect: Mood normal.         Behavior: Behavior normal.       Lab Results   Component Value Date    WBC 4.8 08/30/2023    HGB  "9.8 (L) 08/30/2023    HCT 29.3 (L) 08/30/2023    MCV 99 08/30/2023    PLT 98 (L) 08/30/2023     Lab Results   Component Value Date    CHOL 132 06/19/2023    CHOL 109 06/07/2022    CHOL 125 01/08/2021     Lab Results   Component Value Date    HDL 36.0 (A) 06/19/2023    HDL 28.5 (A) 06/07/2022    HDL 32.8 (A) 01/08/2021     No results found for: \"LDLCALC\"  Lab Results   Component Value Date    TRIG 148 06/19/2023    TRIG 106 06/07/2022    TRIG 147 01/08/2021     No components found for: \"CHOLHDL\"  Lab Results   Component Value Date    HGBA1C 5.8 07/01/2021       Assessment/Plan   Problem List Items Addressed This Visit       Hyperlipidemia    Hypertension    Primary osteoarthritis of left hip       "

## 2023-09-01 LAB
ALBUMIN (G/DL) IN SER/PLAS: 3.8 G/DL (ref 3.4–5)
ANION GAP IN SER/PLAS: 12 MMOL/L (ref 10–20)
CALCIUM (MG/DL) IN SER/PLAS: 9.2 MG/DL (ref 8.6–10.3)
CARBON DIOXIDE, TOTAL (MMOL/L) IN SER/PLAS: 25 MMOL/L (ref 21–32)
CHLORIDE (MMOL/L) IN SER/PLAS: 105 MMOL/L (ref 98–107)
CREATININE (MG/DL) IN SER/PLAS: 1.33 MG/DL (ref 0.5–1.05)
GFR FEMALE: 42 ML/MIN/1.73M2
GLUCOSE (MG/DL) IN SER/PLAS: 109 MG/DL (ref 74–99)
PHOSPHATE (MG/DL) IN SER/PLAS: 4.1 MG/DL (ref 2.5–4.9)
POTASSIUM (MMOL/L) IN SER/PLAS: 4.3 MMOL/L (ref 3.5–5.3)
SODIUM (MMOL/L) IN SER/PLAS: 138 MMOL/L (ref 136–145)
UREA NITROGEN (MG/DL) IN SER/PLAS: 38 MG/DL (ref 6–23)

## 2023-09-01 NOTE — PROGRESS NOTES
*Provider Impression*    Patient is a 73 year old female who is seen today for management of multiple medical problems       #L hip OA - s/p L NARESH; PT/OT, f/u w/ Dr. Rodas, Norco 5/325mg 2 tabs q8h PRN, acetaminophen 500mg q6h, ASA 325mg daily until 9/28  #HTN / HLD - amlodipine 5mg daily, atenolol/chlorthalidone 25/100mg daily, losartan 50mg daily, atorvastatin 10mg daily, fish oil 2000mg BID  #Nausea / Consitpation - fiberlax 625mg daily, milk thistle 175mg daily, omeprazole 40mg BID, add senna-S 8.6/50mg 2 tabs QHS, add zofran 4mg q6h PRN,   #Asthma / Rhinitis - flonase 50mcg daily, montelukast 10mg daily  #Vitamin deficiency - multivitamin daily, vitamin D 2000units daily  #Sicca syndrome - mgmt per rheumatology - restasis  Follow up as needed      *Chief Complaint*     OA    *History of Present Illness*    Patient is a 72 y/o female w/ PMH as below who was admitted for elective L THR.  Apparently she had an uneventful post-operative course based on available notes and was d/c to [facility] for rehab.    She is seen sitting up in her room today and reports some nausea, low appetite, pain is controlled as long as sitting, up to 8/10, down to 0/10, has chronic pain in her tailbone, which is positional. She reports she thinks pain medicine is part of the n/v, had 1 emesis yesterday, none today, scopolamine patch until tomorrow which was placed in the hospital. She is passing gas, LBM 8/27, no CP, SOB, cough, f/c, LUTS, edema, calf pain, or any other new c/o presently.    [place] - Benazepril, Vancomycin, Bactrim, Benicar, Plaquenil   PMH - asthma, CKD3, esophageal stricture, HTN, HLD, lymphedema, lipoma, CELIA, OA, sacroiliitis, Sicca syndrome, thrombocytopenia,   PSH - cataract surgery, elbow surgery, esophageal dilatation, tonsillectomy, THR  FH - Father had heart block, Mother had CVA; Brother had esophageal cancer  SocHx - Never smoker, No EtOH    *Review of Systems*  All other systems reviewed are  negative except as noted in the HPI     *Vital Signs*   Date: 8/30/23  - T: 97.3  P: 61  R: 16  BP: 137/60  SpO2: 93% on RA    *Results / Data*  CBC - Date: 8/30/23  WBC: 4.8  HGB: 9.8  HCT: 29.3  PLT: 98  ;   BMP - Date: 8/30/23  Na: 135  K: 3.8  Cl: 103  CO2: 22  BUN: 24  Cr: 1.33  Glu: 160  Ca: 8.7  ;   LFT - Date: 8/30/23  AST: 26  ALT: 14  ALP: 50  TBili: 0.8  ;   Coags - Date:   INR:   PT:    *Physical Exam*  Gen: (+) NAD, (+) well-appearing  HEENT: (+) normocephalic, (+) MMM  Neck: (+) supple  Lungs: (+) CTAB, (-) wheezes, (-) rales, (-) rhonchi  Heart: (+) RRR, (+) S1 S2, (-) murmurs  Pulses: (+) palpable  Abd: (+) soft, (+) NT, (+) ND, (+) BS+  Ext: (-) edema, (-) deformity  MSK: (-) joint swelling  Skin: (+) warm, (+) dry, (-) rash  Neuro: (+) follows commands, (-) tremor, (+) alert

## 2023-09-03 ASSESSMENT — ENCOUNTER SYMPTOMS
SORE THROAT: 0
WEAKNESS: 1
BRUISES/BLEEDS EASILY: 0
EYE PAIN: 0
ARTHRALGIAS: 1
WOUND: 0
PALPITATIONS: 0
HEADACHES: 0
COUGH: 0
FEVER: 0
RHINORRHEA: 0
EYE REDNESS: 0
SHORTNESS OF BREATH: 0
FATIGUE: 0
DYSURIA: 0
NECK STIFFNESS: 0
CHILLS: 0
HEMATURIA: 0
POLYDIPSIA: 0
ABDOMINAL PAIN: 0
BACK PAIN: 0
ADENOPATHY: 0
HALLUCINATIONS: 0
BLOOD IN STOOL: 0

## 2023-09-04 ENCOUNTER — NURSING HOME VISIT (OUTPATIENT)
Dept: POST ACUTE CARE | Facility: EXTERNAL LOCATION | Age: 73
End: 2023-09-04
Payer: MEDICARE

## 2023-09-04 DIAGNOSIS — R60.9 EDEMA, UNSPECIFIED TYPE: ICD-10-CM

## 2023-09-04 DIAGNOSIS — M16.12 PRIMARY OSTEOARTHRITIS OF LEFT HIP: ICD-10-CM

## 2023-09-04 PROCEDURE — 99308 SBSQ NF CARE LOW MDM 20: CPT | Performed by: FAMILY MEDICINE

## 2023-09-04 NOTE — LETTER
Patient: Patty Johnson  : 1950    Encounter Date: 2023    Resident seen 23 -- MP    CC: [facility] ([place]) recheck    : 1950  SNF H&P done 23  Allergy: ACEi (Benazepril), Vancomycin, Sulfa (Bactrim), ARB (Benicar), Plaquenil.  FULL CODE    S: 74 yo woman with HTN, CKD3, and hip OA s/p elective Left NARESH complicated by nausea and vomiting. No cp/sob. Med list & problem list reviewed.    O: VSS AFEB 250# (23) Awake, alert, NAD. Chest cta. Heart rrr. Ext trace edema. Left posterior thigh incision c/d/i.    A/P:  # Left hip OA s/p NARESH: norco prn pain. Routine tylenol. Add ibuprofen 600 mg with food qam and bid prn pain. Ice. SNF PT/OT.  # Nausea/GERD: PPI, zofran prn.  # HTN: norvasc 5, Tenoretic 100/25, losartan 50  # HLD: atorvastatin 10  # Edema: DENNIS hose on qam.      Electronically Signed By: Gustabo Shaw MD   23  6:18 PM

## 2023-09-06 ENCOUNTER — NURSING HOME VISIT (OUTPATIENT)
Dept: POST ACUTE CARE | Facility: EXTERNAL LOCATION | Age: 73
End: 2023-09-06
Payer: MEDICARE

## 2023-09-06 DIAGNOSIS — E78.5 HYPERLIPIDEMIA, UNSPECIFIED HYPERLIPIDEMIA TYPE: ICD-10-CM

## 2023-09-06 DIAGNOSIS — R11.0 NAUSEA: ICD-10-CM

## 2023-09-06 DIAGNOSIS — E56.9 VITAMIN DEFICIENCY: ICD-10-CM

## 2023-09-06 DIAGNOSIS — J45.909 ASTHMA, UNSPECIFIED ASTHMA SEVERITY, UNSPECIFIED WHETHER COMPLICATED, UNSPECIFIED WHETHER PERSISTENT (HHS-HCC): ICD-10-CM

## 2023-09-06 DIAGNOSIS — K59.00 CONSTIPATION, UNSPECIFIED CONSTIPATION TYPE: ICD-10-CM

## 2023-09-06 DIAGNOSIS — K76.0 NAFLD (NONALCOHOLIC FATTY LIVER DISEASE): ICD-10-CM

## 2023-09-06 DIAGNOSIS — M16.12 PRIMARY OSTEOARTHRITIS OF LEFT HIP: Primary | ICD-10-CM

## 2023-09-06 DIAGNOSIS — I10 PRIMARY HYPERTENSION: ICD-10-CM

## 2023-09-06 DIAGNOSIS — J31.0 RHINITIS, UNSPECIFIED TYPE: ICD-10-CM

## 2023-09-06 DIAGNOSIS — M35.00 SICCA SYNDROME (MULTI): ICD-10-CM

## 2023-09-06 PROCEDURE — 99309 SBSQ NF CARE MODERATE MDM 30: CPT | Performed by: NURSE PRACTITIONER

## 2023-09-07 ENCOUNTER — NURSING HOME VISIT (OUTPATIENT)
Dept: POST ACUTE CARE | Facility: EXTERNAL LOCATION | Age: 73
End: 2023-09-07
Payer: MEDICARE

## 2023-09-07 DIAGNOSIS — M16.12 PRIMARY OSTEOARTHRITIS OF LEFT HIP: ICD-10-CM

## 2023-09-07 DIAGNOSIS — I10 PRIMARY HYPERTENSION: ICD-10-CM

## 2023-09-07 PROBLEM — R60.9 EDEMA: Status: ACTIVE | Noted: 2023-09-07

## 2023-09-07 PROCEDURE — 99316 NF DSCHRG MGMT 30 MIN+: CPT | Performed by: FAMILY MEDICINE

## 2023-09-07 NOTE — PROGRESS NOTES
Resident seen 23 -- MP    CC: [facility] ([place]) recheck    : 1950  SNF H&P done 23  Allergy: ACEi (Benazepril), Vancomycin, Sulfa (Bactrim), ARB (Benicar), Plaquenil.  FULL CODE    S: 72 yo woman with HTN, CKD3, and hip OA s/p elective Left NARESH complicated by nausea and vomiting. No cp/sob. Med list & problem list reviewed.    O: VSS AFEB 250# (23) Awake, alert, NAD. Chest cta. Heart rrr. Ext trace edema. Left posterior thigh incision c/d/i.    A/P:  # Left hip OA s/p NARESH: norco prn pain. Routine tylenol. Add ibuprofen 600 mg with food qam and bid prn pain. Ice. SNF PT/OT.  # Nausea/GERD: PPI, zofran prn.  # HTN: norvasc 5, Tenoretic 100/25, losartan 50  # HLD: atorvastatin 10  # Edema: DENNIS hose on qam.

## 2023-09-07 NOTE — LETTER
Patient: Patty Johnson  : 1950    Encounter Date: 2023    Resident seen 23 -- MP    CC: [facility] ([place]) TATIANA Note (34 minutes spent on discharge exam, ERx to GE in , documentation for home health services)    : 1950  SNF H&P done 23  Allergy: ACEi (Benazepril), Vancomycin, Sulfa (Bactrim), ARB (Benicar), Plaquenil.  FULL CODE    S: 74 yo woman with HTN, CKD3, and hip OA s/p elective Left NARESH complicated by nausea and vomiting and left leg edema. No cp/sob. Med list & problem list reviewed.    O: VSS AFEB 242# (down 8#) Awake, alert, NAD. Chest cta. Heart rrr. Ext 1+ LLE edema. Left posterior thigh incision c/d/i.    A/P:  # Left hip OA s/p NARESH: norco prn pain. Routine tylenol. Continue ibuprofen 600 mg with food qam and bid prn pain. Ice. Completes SNF PT/OT 23 for discharge home with skilled home PT/OT/SN for help with transition to home with homebound status.  # Nausea/GERD: PPI, zofran prn.  # HTN: norvasc 5, Tenoretic 100/25, losartan 50  # HLD: atorvastatin 10  # Edema: DENNIS hose on qam.      Electronically Signed By: Gustabo Shaw MD   23  6:21 PM

## 2023-09-10 NOTE — PROGRESS NOTES
*Provider Impression*    Patient is a 73 year old female who is seen today for management of multiple medical problems       #L hip OA - s/p L THR; PT/OT, f/u w/ Dr. Rodas, Norco 5/325mg q8h PRN, acetaminophen 500mg q6h, ASA 325mg daily until 9/28  #HTN / HLD - amlodipine 5mg daily, atenolol/chlorthalidone 25/100mg daily, losartan 50mg daily, atorvastatin 10mg daily, fish oil 2000mg BID  #Nausea / Consitpation / NAFLD - fiberlax 625mg daily, milk thistle 175mg daily, omeprazole 40mg BID, senna-S 8.6/50mg 2 tabs QHS, zofran 4mg q6h PRN,   #Asthma / Rhinitis - flonase 50mcg daily, montelukast 10mg daily  #Vitamin deficiency - multivitamin daily, vitamin D 2000units daily  #Sicca syndrome - mgmt per rheumatology - restasis  #ACP - Full Code  Follow up as needed      *Chief Complaint*     OA    *History of Present Illness*    Patient is a 72 y/o female w/ PMH as below who was admitted for elective L THR.  Apparently she had an uneventful post-operative course based on available notes and was d/c to [facility] [place] for rehab.    Her labs appreciated today as below.     She is seen sitting up in her room and reports she is still sore, 1/10 at rest, up to 5/10 w/ activity. She asked to have norco decreased. She denies any nausea today since then, no f/c, sweats, CP, SOB, cough, constipation, LBM today, LUTS, numbness, tingling, paresthesias, still good appetite, but not like she usually eats. No other new c/o presently.    [place]eriges - Benazepril, Vancomycin, Bactrim, Benicar, Plaquenil   PMH - asthma, CKD3, esophageal stricture, HTN, HLD, lymphedema, lipoma, CELIA, OA, sacroiliitis, Sicca syndrome, thrombocytopenia,   PSH - cataract surgery, elbow surgery, esophageal dilatation, tonsillectomy, THR  FH - Father had heart block, Mother had CVA; Brother had esophageal cancer  SocHx - Never smoker, No EtOH    *Review of Systems*  All other systems reviewed are negative except as noted in the HPI     *Vital Signs*   Date:  9/6/23  - T: 96.9  P: 80  R: 18  BP: 154/88  SpO2: 99% on RA    *Results / Data*  CBC - Date: 8/30/23  WBC: 4.8  HGB: 9.8  HCT: 29.3  PLT: 98  ;   BMP - Date: 9/1/23  Na: 138  K: 4.3  Cl: 105  CO2: 25  BUN: 38  Cr: 1.33  Glu: 109  Ca: 9.2 Phos: 4.1 Alb: 3.8  ;   LFT - Date: 8/30/23  AST: 26  ALT: 14  ALP: 50  TBili: 0.8  ;   Coags - Date:   INR:   PT:    *Physical Exam*  Gen: (+) NAD, (+) well-appearing  HEENT: (+) normocephalic, (+) MMM  Neck: (+) supple  Lungs: (+) CTAB, (-) wheezes, (-) rales, (-) rhonchi  Heart: (+) RRR, (+) S1 S2, (-) murmurs  Pulses: (+) palpable  Abd: (+) soft, (+) NT, (+) ND, (+) BS+  Ext: (-) edema, (-) deformity  MSK: (-) joint swelling  Skin: (+) warm, (+) dry, (-) rash  Neuro: (+) follows commands, (-) tremor, (+) alert

## 2023-09-11 NOTE — PROGRESS NOTES
Resident seen 23 -- MP    CC: SNF ([facility]) DC Note (34 minutes spent on discharge exam, ERx to GE in MF, documentation for home health services)    : 1950  SNF H&P done 23  Allergy: ACEi (Benazepril), Vancomycin, Sulfa (Bactrim), ARB (Benicar), Plaquenil.  FULL CODE    S: 74 yo woman with HTN, CKD3, and hip OA s/p elective Left NARESH complicated by nausea and vomiting and left leg edema. No cp/sob. Med list & problem list reviewed.    O: VSS AFEB 242# (down 8#) Awake, alert, NAD. Chest cta. Heart rrr. Ext 1+ LLE edema. Left posterior thigh incision c/d/i.    A/P:  # Left hip OA s/p NARESH: norco prn pain. Routine tylenol. Continue ibuprofen 600 mg with food qam and bid prn pain. Ice. Completes SNF PT/OT 23 for discharge home with skilled home PT/OT/SN for help with transition to home with homebound status.  # Nausea/GERD: PPI, zofran prn.  # HTN: norvasc 5, Tenoretic 100/25, losartan 50  # HLD: atorvastatin 10  # Edema: DENNIS hose on qam.

## 2023-09-12 ENCOUNTER — PATIENT OUTREACH (OUTPATIENT)
Dept: CARE COORDINATION | Facility: CLINIC | Age: 73
End: 2023-09-12
Payer: MEDICARE

## 2023-09-12 ENCOUNTER — DOCUMENTATION (OUTPATIENT)
Dept: CARE COORDINATION | Facility: CLINIC | Age: 73
End: 2023-09-12
Payer: MEDICARE

## 2023-09-12 ENCOUNTER — TELEPHONE (OUTPATIENT)
Dept: PRIMARY CARE | Facility: CLINIC | Age: 73
End: 2023-09-12
Payer: MEDICARE

## 2023-09-12 NOTE — TELEPHONE ENCOUNTER
Per Dr. Shaw-will sign certification to begin but will not follow-that should go to her PCP  Spoke with Rick and informed.

## 2023-09-12 NOTE — TELEPHONE ENCOUNTER
Rick from [facility] - wants to know if Dr. Shaw will follow this patient once she is discharge from [facility] for [facility]?     [phone] ext [phone]

## 2023-09-13 NOTE — PROGRESS NOTES
Discharge Facility: [facility]  Discharge Diagnosis: osteoarthritis, left total hip arthroplasty  Admission Date: 8/29/23  Discharge Date: 9/9/23    PCP Appointment Date: no appt, message to clinical pool  Specialist Appointment Date: n/a  Hospital Encounter and Summary: not available at this time   Unsuccessful attempts to reach patient x2

## 2023-09-26 NOTE — PROGRESS NOTES
Subjective   Patient ID: Patty Johnson is a 73 y.o. female who presents for Hospital Follow-up.    HPI     S/p left NARESH one month ago by Dr. Rodas. Went to rehab facility afterwards. Now using a cane to ambulate.     BP has been joe at home SBP 140s-150s    Review of Systems   All other systems reviewed and are negative.      Objective   There were no vitals taken for this visit.    Physical Exam  Constitutional:       Appearance: Normal appearance.   HENT:      Head: Normocephalic and atraumatic.   Cardiovascular:      Rate and Rhythm: Normal rate and regular rhythm.      Heart sounds: Normal heart sounds. No murmur heard.     No gallop.   Pulmonary:      Effort: Pulmonary effort is normal. No respiratory distress.      Breath sounds: No wheezing or rales.   Skin:     General: Skin is warm and dry.      Findings: No rash.   Neurological:      Mental Status: She is alert and oriented to person, place, and time. Mental status is at baseline.   Psychiatric:         Mood and Affect: Mood normal.         Behavior: Behavior normal.       Assessment/Plan       #HTN  Increase losartan to 100mg daily . Cont atenolol/hydrochlorothiazide and amlodipine 5mg daily

## 2023-09-27 ENCOUNTER — OFFICE VISIT (OUTPATIENT)
Dept: PRIMARY CARE | Facility: CLINIC | Age: 73
End: 2023-09-27
Payer: MEDICARE

## 2023-09-27 VITALS — SYSTOLIC BLOOD PRESSURE: 165 MMHG | DIASTOLIC BLOOD PRESSURE: 92 MMHG | OXYGEN SATURATION: 93 % | HEART RATE: 72 BPM

## 2023-09-27 DIAGNOSIS — I10 PRIMARY HYPERTENSION: Primary | ICD-10-CM

## 2023-09-27 PROCEDURE — 99213 OFFICE O/P EST LOW 20 MIN: CPT | Performed by: INTERNAL MEDICINE

## 2023-09-27 PROCEDURE — 3077F SYST BP >= 140 MM HG: CPT | Performed by: INTERNAL MEDICINE

## 2023-09-27 PROCEDURE — 1160F RVW MEDS BY RX/DR IN RCRD: CPT | Performed by: INTERNAL MEDICINE

## 2023-09-27 PROCEDURE — 1036F TOBACCO NON-USER: CPT | Performed by: INTERNAL MEDICINE

## 2023-09-27 PROCEDURE — 1159F MED LIST DOCD IN RCRD: CPT | Performed by: INTERNAL MEDICINE

## 2023-09-27 PROCEDURE — 3080F DIAST BP >= 90 MM HG: CPT | Performed by: INTERNAL MEDICINE

## 2023-09-27 PROCEDURE — 1126F AMNT PAIN NOTED NONE PRSNT: CPT | Performed by: INTERNAL MEDICINE

## 2023-09-27 RX ORDER — LOSARTAN POTASSIUM 100 MG/1
100 TABLET ORAL DAILY
Qty: 90 TABLET | Refills: 1 | Status: SHIPPED | OUTPATIENT
Start: 2023-09-27 | End: 2023-11-15

## 2023-09-29 VITALS — BODY MASS INDEX: 41.21 KG/M2 | WEIGHT: 247.36 LBS | HEIGHT: 65 IN

## 2023-09-30 NOTE — H&P
History & Physical Reviewed:   I have reviewed the History and Physical dated:  28-Aug-2023   History and Physical reviewed and relevant findings noted. Patient examined to review pertinent physical  findings.: No significant changes   Home Medications Reviewed: no changes noted   Allergies Reviewed: no changes noted       ERAS (Enhanced Recovery After Surgery):  ·  ERAS Patient: no     Consent:   COVID-19 Consent:  ·  COVID-19 Risk Consent Surgeon has reviewed key risks related to the risk of lisa COVID-19 and if they contract COVID-19 what the risks are.       Electronic Signatures:  Leighton Rodas)  (Signed 28-Aug-2023 10:25)   Authored: History & Physical Reviewed, ERAS, Consent,  Note Completion      Last Updated: 28-Aug-2023 10:25 by Leighton Rodas)

## 2023-10-01 NOTE — OP NOTE
Post Operative Note:     PreOp Diagnosis: osteoarthritis left hip   Post-Procedure Diagnosis: osteoarthritis left hip   Procedure: left Total Hip Replacement   Surgeon: Leighton Rodas D.O.   Resident/Fellow/Other Assistant: Fabricio Abad S.A. , Vaughn Barrios R.N.   Anesthesia: spinal   Estimated Blood Loss (mL): 100 ml   Specimen: no   Findings: osteoarthritis left hip       Electronic Signatures:  Leighton Rodas)  (Signed 28-Aug-2023 13:22)   Authored: Post Operative Note, Note Completion      Last Updated: 28-Aug-2023 13:22 by Leighton Rodas)

## 2023-10-04 ENCOUNTER — PATIENT OUTREACH (OUTPATIENT)
Dept: CARE COORDINATION | Facility: CLINIC | Age: 73
End: 2023-10-04
Payer: MEDICARE

## 2023-10-04 NOTE — PROGRESS NOTES
Unable to reach patient for call back after patient's follow up appointment with PCP.   DAEM with call back number for patient to call if needed   If no voicemail available call attempts x 2 were made to contact the patient to assist with any questions or concerns patient may have.

## 2023-11-12 DIAGNOSIS — I10 PRIMARY HYPERTENSION: ICD-10-CM

## 2023-11-15 RX ORDER — LOSARTAN POTASSIUM 100 MG/1
100 TABLET ORAL DAILY
Qty: 100 TABLET | Refills: 2 | Status: SHIPPED | OUTPATIENT
Start: 2023-11-15

## 2023-11-15 RX ORDER — AMLODIPINE BESYLATE 5 MG/1
5 TABLET ORAL DAILY
Qty: 100 TABLET | Refills: 2 | Status: SHIPPED | OUTPATIENT
Start: 2023-11-15 | End: 2024-03-01 | Stop reason: SDUPTHER

## 2023-11-15 NOTE — TELEPHONE ENCOUNTER
Requested Prescriptions     Pending Prescriptions Disp Refills    losartan (Cozaar) 100 mg tablet [Pharmacy Med Name: Losartan Potassium 100 MG Oral Tablet] 100 tablet 2     Sig: TAKE 1 TABLET BY MOUTH ONCE  DAILY    amLODIPine (Norvasc) 5 mg tablet [Pharmacy Med Name: amLODIPine Besylate 5 MG Oral Tablet] 100 tablet 2     Sig: TAKE 1 TABLET BY MOUTH DAILY

## 2023-11-16 ENCOUNTER — TELEMEDICINE (OUTPATIENT)
Dept: UROLOGY | Facility: CLINIC | Age: 73
End: 2023-11-16
Payer: MEDICARE

## 2023-11-16 DIAGNOSIS — M62.89 PELVIC FLOOR DYSFUNCTION: ICD-10-CM

## 2023-11-16 DIAGNOSIS — K59.00 CONSTIPATION, UNSPECIFIED CONSTIPATION TYPE: ICD-10-CM

## 2023-11-16 DIAGNOSIS — N32.81 OAB (OVERACTIVE BLADDER): Primary | ICD-10-CM

## 2023-11-16 PROCEDURE — 99443 PR PHYS/QHP TELEPHONE EVALUATION 21-30 MIN: CPT | Performed by: STUDENT IN AN ORGANIZED HEALTH CARE EDUCATION/TRAINING PROGRAM

## 2023-11-16 NOTE — PROGRESS NOTES
CHIEF COMPLAINT:  Virtual FUV             HISTORY OF PRESENT ILLNESS:  This is a 73 y.o. female who presents with a virtual follow up visit. Patient is not doing well. Patient reports her bladder symptoms have returned and she cannot control her bladder anymore. She did have hip surgery on August 28th. She is getting up at nighttime about 3 - 4 times per night and she voids much. On other nights she may get up once or twice with not much voiding at all. She is not sure if it is related to what she is drinking in a day. She also states when she is sitting in her chair for long periods of time and goes to stand up she will get an urge and has to try to hurry to the bathroom or she will have an accident. Patient also complains she is having difficulty with making bowel movements since after her hip surgery. She had to try using laxatives and stool softeners to have a bowel movement completely while in the hospital. Sometimes it will take up to 3 days for her to be able to go and it is causing some pain. Patient does have a rectocele prolapse.              HISTORY OF PRESENT ILLNESS:           Past Medical History  She has a past medical history of Personal history of other diseases of the musculoskeletal system and connective tissue and Personal history of other diseases of the respiratory system.    Surgical History  She has a past surgical history that includes Total hip arthroplasty (09/08/2018); Other surgical history (03/04/2020); Other surgical history (03/04/2020); Other surgical history (03/04/2020); Other surgical history (09/18/2018); CT angio abdomen pelvis w and or wo IV IV contrast (5/13/2014); CT angio abdomen pelvis w and or wo IV IV contrast (5/17/2018); CT angio abdomen pelvis w and or wo IV IV contrast (12/5/2019); CT angio abdomen pelvis w and or wo IV IV contrast (12/9/2016); and CT angio abdomen pelvis w and or wo IV IV contrast (8/14/2023).     Social History  She reports that she has never  smoked. She has never used smokeless tobacco. No history on file for alcohol use and drug use.    Family History  Family History   Problem Relation Name Age of Onset    Stroke Mother          cerebrovascular accident (CVA)    Heart block Father      Esophageal cancer Brother      Cancer Other      Hypertension Other          Allergies  Benazepril, Hydroxychloroquine, Olmesartan, and Sulfamethoxazole-trimethoprim      A comprehensive 10+ review of systems was negative except for: see hpi                     Assessment:    72-year-old with mixed incontinence, urgency dominant     JOHN     -Has failed Vesicare and Myrbetriq and Kegel's, bladder training exercises and fluid management  -UDS demonstrates detrusor overactivity  -No improvement with stage 1, explanted 10/26  s/p botox 12/2/22 with over 90% improvement  s/p Botox, 7/05/2023, 100 units  doing well   Symptoms have returned now  Will schedule appointment for a Botox injection treatment within 4 - 6 weeks     Vulvar discomfort:  Continue lotrisone  Continue estradiol      rectocele:   Now having BM sx, will try smooth move tea first     Discussed options for a prolapse repair surgery          Rufino Vaughan MD

## 2023-11-20 ENCOUNTER — TELEMEDICINE (OUTPATIENT)
Dept: PRIMARY CARE | Facility: CLINIC | Age: 73
End: 2023-11-20
Payer: MEDICARE

## 2023-11-20 DIAGNOSIS — U07.1 COVID-19 VIRUS INFECTION: Primary | ICD-10-CM

## 2023-11-20 PROCEDURE — 99442 PR PHYS/QHP TELEPHONE EVALUATION 11-20 MIN: CPT | Performed by: INTERNAL MEDICINE

## 2023-11-20 RX ORDER — NIRMATRELVIR AND RITONAVIR 150-100 MG
2 KIT ORAL 2 TIMES DAILY
Qty: 20 TABLET | Refills: 0 | Status: SHIPPED | OUTPATIENT
Start: 2023-11-20 | End: 2023-11-25

## 2023-11-20 NOTE — PROGRESS NOTES
Subjective   Patient ID: Patty Johnson is a 73 y.o. female who presents for Covid-19 Testing (POSITIVE 11/20).    HPI   Reports runny nose, cough  No fever or SOB   COVID +    Review of Systems   All other systems reviewed and are negative.      Objective   There were no vitals taken for this visit.    Physical Exam    NAD  Talks in complete sentences  Mood and affect are appropriate       Assessment/Plan       Rx paxlovid   Cont supportive care

## 2023-11-27 ENCOUNTER — APPOINTMENT (OUTPATIENT)
Dept: PRIMARY CARE | Facility: CLINIC | Age: 73
End: 2023-11-27
Payer: MEDICARE

## 2023-11-30 ENCOUNTER — APPOINTMENT (OUTPATIENT)
Dept: PRIMARY CARE | Facility: CLINIC | Age: 73
End: 2023-11-30
Payer: MEDICARE

## 2023-12-19 DIAGNOSIS — E79.0 HYPERURICEMIA: Primary | ICD-10-CM

## 2023-12-19 RX ORDER — ALLOPURINOL 100 MG/1
100 TABLET ORAL DAILY
Qty: 100 TABLET | Refills: 2 | Status: SHIPPED | OUTPATIENT
Start: 2023-12-19

## 2024-01-02 DIAGNOSIS — I10 PRIMARY HYPERTENSION: Primary | ICD-10-CM

## 2024-01-04 RX ORDER — LOSARTAN POTASSIUM 50 MG/1
50 TABLET ORAL DAILY
Qty: 90 TABLET | Refills: 0 | Status: SHIPPED | OUTPATIENT
Start: 2024-01-04 | End: 2024-03-01 | Stop reason: ALTCHOICE

## 2024-01-04 NOTE — TELEPHONE ENCOUNTER
Requested Prescriptions     Pending Prescriptions Disp Refills    losartan (Cozaar) 50 mg tablet [Pharmacy Med Name: Losartan Potassium Oral Tablet 50 MG] 90 tablet 0     Sig: TAKE ONE TABLET BY MOUTH DAILY

## 2024-01-08 ENCOUNTER — PROCEDURE VISIT (OUTPATIENT)
Dept: UROLOGY | Facility: CLINIC | Age: 74
End: 2024-01-08
Payer: MEDICARE

## 2024-01-08 DIAGNOSIS — R10.2 PELVIC PAIN: ICD-10-CM

## 2024-01-08 DIAGNOSIS — N32.81 OAB (OVERACTIVE BLADDER): ICD-10-CM

## 2024-01-08 DIAGNOSIS — R32 URINARY INCONTINENCE, UNSPECIFIED TYPE: ICD-10-CM

## 2024-01-08 DIAGNOSIS — N32.81 OVERACTIVE BLADDER: Primary | ICD-10-CM

## 2024-01-08 DIAGNOSIS — N39.46 MIXED INCONTINENCE: ICD-10-CM

## 2024-01-08 LAB
POC APPEARANCE, URINE: CLEAR
POC BILIRUBIN, URINE: NEGATIVE
POC BLOOD, URINE: ABNORMAL
POC COLOR, URINE: YELLOW
POC GLUCOSE, URINE: NEGATIVE MG/DL
POC KETONES, URINE: NEGATIVE MG/DL
POC LEUKOCYTES, URINE: ABNORMAL
POC NITRITE,URINE: NEGATIVE
POC PH, URINE: 5.5 PH
POC PROTEIN, URINE: ABNORMAL MG/DL
POC SPECIFIC GRAVITY, URINE: 1.02
POC UROBILINOGEN, URINE: 0.2 EU/DL

## 2024-01-08 PROCEDURE — 99213 OFFICE O/P EST LOW 20 MIN: CPT | Performed by: STUDENT IN AN ORGANIZED HEALTH CARE EDUCATION/TRAINING PROGRAM

## 2024-01-08 PROCEDURE — 52287 CYSTOSCOPY CHEMODENERVATION: CPT | Performed by: STUDENT IN AN ORGANIZED HEALTH CARE EDUCATION/TRAINING PROGRAM

## 2024-01-08 RX ORDER — NITROFURANTOIN 25; 75 MG/1; MG/1
100 CAPSULE ORAL 2 TIMES DAILY
Qty: 6 CAPSULE | Refills: 0 | Status: SHIPPED | OUTPATIENT
Start: 2024-01-08 | End: 2024-01-11

## 2024-01-08 RX ORDER — LIDOCAINE HYDROCHLORIDE 10 MG/ML
50 INJECTION INFILTRATION; PERINEURAL ONCE
Status: COMPLETED | OUTPATIENT
Start: 2024-01-08 | End: 2024-01-08

## 2024-01-08 RX ADMIN — LIDOCAINE HYDROCHLORIDE 500 MG: 10 INJECTION INFILTRATION; PERINEURAL at 14:40

## 2024-01-08 NOTE — PROGRESS NOTES
HISTORY OF PRESENT ILLNESS:  Patty Johnson is a 73 y.o. female presenting for Botox injections. She reports experiencing urinary incontinence. She states the rectocele is burning after showering today. Typically when she strains during bowel movements the rectocele feels like its ripping and then burning occurs. She has a bowel movement daily. She is not currently using estrogen.           Past Medical History  She has a past medical history of Personal history of other diseases of the musculoskeletal system and connective tissue and Personal history of other diseases of the respiratory system.    Surgical History  She has a past surgical history that includes Total hip arthroplasty (09/08/2018); Other surgical history (03/04/2020); Other surgical history (03/04/2020); Other surgical history (03/04/2020); Other surgical history (09/18/2018); CT angio abdomen pelvis w and or wo IV IV contrast (5/13/2014); CT angio abdomen pelvis w and or wo IV IV contrast (5/17/2018); CT angio abdomen pelvis w and or wo IV IV contrast (12/5/2019); CT angio abdomen pelvis w and or wo IV IV contrast (12/9/2016); and CT angio abdomen pelvis w and or wo IV IV contrast (8/14/2023).     Social History  She reports that she has never smoked. She has never used smokeless tobacco. No history on file for alcohol use and drug use.    Family History  Family History   Problem Relation Name Age of Onset    Stroke Mother          cerebrovascular accident (CVA)    Heart block Father      Esophageal cancer Brother      Cancer Other      Hypertension Other          Allergies  Benazepril, Hydroxychloroquine, Olmesartan, and Sulfamethoxazole-trimethoprim      A comprehensive 10+ review of systems was negative except for: see hpi                          PHYSICAL EXAMINATION:  BP Readings from Last 3 Encounters:   09/27/23 (!) 165/92   06/20/23 142/60   05/22/23 139/82      Wt Readings from Last 3 Encounters:   06/20/23 111 kg (244 lb 3 oz)  "  05/22/23 111 kg (244 lb)   10/28/22 108 kg (238 lb)      BMI: Estimated body mass index is 41.16 kg/m² as calculated from the following:    Height as of 8/28/23: 1.651 m (5' 5\").    Weight as of 8/28/23: 112 kg (247 lb 5.7 oz).  BSA: Estimated body surface area is 2.27 meters squared as calculated from the following:    Height as of 8/28/23: 1.651 m (5' 5\").    Weight as of 8/28/23: 112 kg (247 lb 5.7 oz).  HEENT: Normocephalic, atraumatic, PER EOMI, nonicteric, trachea normal, thyroid normal, oropharynx normal.  CARDIAC: regular rate & rhythm, S1 & S2 normal.  No heaves, thrills, gallops or murmurs.  LUNGS: Clear to auscultation, no spinal or CV tenderness.  EXTREMITIES: No evidence of cyanosis, clubbing or edema.            Procedure and Findings: After informed consent was obtained, the patient was placed under anesthesia and  the patient was placed in the dorsal lithotomy position and was prepped and draped in a sterile fashion. A 21 K[place] scope was inserted and the entire urethra and bladder were examined.  A complete cystoscopy was performed. There were no mucosal lesions noted. The ureteral orifices were in normal location.     200 units of Botox were injected into the bladder muscle in 6 sites diluted in 20 ml of normal saline . The patient tolerated the procedure well. There were no complications. Routine post-cystoscopy instructions were given.     I was present for the entire procedure and performed all critical aspects of the procedure myself.            Assessment:  73-year-old with mixed incontinence, urgency dominant     JOHN  -Has failed Vesicare and Myrbetriq and Kegel's, bladder training exercises and fluid management  -UDS demonstrates detrusor overactivity  -No improvement with stage 1, explanted 10/26  s/p botox 12/2/22, 7/5/23 with over 90% improvement, 100 units     Botox 200 units 1/8/24         Vulvar discomfort:  Continue lotrisone     rectocele:   Now having BM sx and pelvic pain will " try smooth move tea first   Refer to PT     Follow up in 4-6 weeks    Rufino Vaughan MD    Scribe Attestation  By signing my name below, I, Ansley Hogue attest that this documentation has been prepared under the direction and in the presence of Rufino Vaughan MD. All medical record entries made by the Scribe were at my direction or personally dictated by me.

## 2024-01-09 RX ORDER — NITROFURANTOIN 25; 75 MG/1; MG/1
100 CAPSULE ORAL 2 TIMES DAILY
Qty: 14 CAPSULE | Refills: 0 | Status: SHIPPED | OUTPATIENT
Start: 2024-01-09 | End: 2024-01-16

## 2024-01-26 DIAGNOSIS — E78.5 HYPERLIPIDEMIA, UNSPECIFIED HYPERLIPIDEMIA TYPE: ICD-10-CM

## 2024-01-26 NOTE — PROGRESS NOTES
Orders Placed This Encounter   Procedures    CBC     Standing Status:   Future     Standing Expiration Date:   1/26/2025     Order Specific Question:   Release result to MyChart     Answer:   Immediate    Comprehensive Metabolic Panel     Standing Status:   Future     Standing Expiration Date:   1/26/2025     Order Specific Question:   Release result to MyChart     Answer:   Immediate    Lipid Panel     Standing Status:   Future     Standing Expiration Date:   1/26/2025     Order Specific Question:   Release result to MyChart     Answer:   Immediate

## 2024-02-08 DIAGNOSIS — K21.9 GASTROESOPHAGEAL REFLUX DISEASE, UNSPECIFIED WHETHER ESOPHAGITIS PRESENT: ICD-10-CM

## 2024-02-08 DIAGNOSIS — I10 PRIMARY HYPERTENSION: ICD-10-CM

## 2024-02-08 DIAGNOSIS — E78.5 HYPERLIPIDEMIA, UNSPECIFIED HYPERLIPIDEMIA TYPE: ICD-10-CM

## 2024-02-08 DIAGNOSIS — J45.909 ASTHMA, UNSPECIFIED ASTHMA SEVERITY, UNSPECIFIED WHETHER COMPLICATED, UNSPECIFIED WHETHER PERSISTENT (HHS-HCC): Primary | ICD-10-CM

## 2024-02-09 RX ORDER — OMEPRAZOLE 40 MG/1
CAPSULE, DELAYED RELEASE ORAL
Qty: 200 CAPSULE | Refills: 1 | Status: SHIPPED | OUTPATIENT
Start: 2024-02-09

## 2024-02-09 RX ORDER — ATENOLOL AND CHLORTHALIDONE TABLET 100; 25 MG/1; MG/1
1 TABLET ORAL DAILY
Qty: 100 TABLET | Refills: 1 | Status: SHIPPED | OUTPATIENT
Start: 2024-02-09

## 2024-02-09 RX ORDER — SIMVASTATIN 20 MG/1
TABLET, FILM COATED ORAL
Qty: 100 TABLET | Refills: 1 | Status: SHIPPED | OUTPATIENT
Start: 2024-02-09

## 2024-02-09 NOTE — TELEPHONE ENCOUNTER
Requested Prescriptions     Pending Prescriptions Disp Refills    atenoloL-chlorthalidone (Tenoretic) 100-25 mg tablet [Pharmacy Med Name: Atenolol-Chlorthalidone 100-25 MG Oral Tablet] 100 tablet 1     Sig: TAKE 1 TABLET BY MOUTH DAILY    omeprazole (PriLOSEC) 40 mg DR capsule [Pharmacy Med Name: Omeprazole 40 MG Oral Capsule Delayed Release] 200 capsule 1     Sig: TAKE 1 CAPSULE BY MOUTH TWICE  DAILY    simvastatin (Zocor) 20 mg tablet [Pharmacy Med Name: Simvastatin 20 MG Oral Tablet] 100 tablet 1     Sig: TAKE 1 TABLET BY MOUTH IN THE  EVENING

## 2024-02-12 RX ORDER — MONTELUKAST SODIUM 10 MG/1
10 TABLET ORAL NIGHTLY
Qty: 100 TABLET | Refills: 2 | Status: SHIPPED | OUTPATIENT
Start: 2024-02-12

## 2024-02-15 ENCOUNTER — OFFICE VISIT (OUTPATIENT)
Dept: PRIMARY CARE | Facility: CLINIC | Age: 74
End: 2024-02-15
Payer: MEDICARE

## 2024-02-15 VITALS
BODY MASS INDEX: 40.1 KG/M2 | OXYGEN SATURATION: 95 % | WEIGHT: 241 LBS | SYSTOLIC BLOOD PRESSURE: 167 MMHG | HEART RATE: 60 BPM | DIASTOLIC BLOOD PRESSURE: 80 MMHG

## 2024-02-15 DIAGNOSIS — M54.2 NECK PAIN: Primary | ICD-10-CM

## 2024-02-15 DIAGNOSIS — K12.2 INFECTION OF MOUTH: Primary | ICD-10-CM

## 2024-02-15 PROCEDURE — 3079F DIAST BP 80-89 MM HG: CPT | Performed by: INTERNAL MEDICINE

## 2024-02-15 PROCEDURE — 99213 OFFICE O/P EST LOW 20 MIN: CPT | Performed by: INTERNAL MEDICINE

## 2024-02-15 PROCEDURE — 1126F AMNT PAIN NOTED NONE PRSNT: CPT | Performed by: INTERNAL MEDICINE

## 2024-02-15 PROCEDURE — 1036F TOBACCO NON-USER: CPT | Performed by: INTERNAL MEDICINE

## 2024-02-15 PROCEDURE — 3077F SYST BP >= 140 MM HG: CPT | Performed by: INTERNAL MEDICINE

## 2024-02-15 NOTE — PROGRESS NOTES
Subjective   Patient ID: Patty Johnson is a 74 y.o. female who presents for Earache (Left ear pain ).    HPI     Reports left sided lateral neck pain x 24 hours. Pain radiates to her left ear. Pain Is intermittent. No e/r factors. No pain down her arm. No ear drainage or new hearing loss.     Review of Systems   All other systems reviewed and are negative.      Objective   There were no vitals taken for this visit.    Physical Exam  HENT:      Head: Normocephalic and atraumatic.      Right Ear: Tympanic membrane, ear canal and external ear normal.      Left Ear: Tympanic membrane, ear canal and external ear normal.      Nose: Nose normal.      Mouth/Throat:      Mouth: Mucous membranes are moist.      Pharynx: Oropharynx is clear.   Musculoskeletal:      Comments: Left lateral neck tenderness         Assessment/Plan       #Neck pain   APAP for pain, ice/heat for comfort. Consider PT if not getting better

## 2024-02-16 ENCOUNTER — TELEPHONE (OUTPATIENT)
Dept: PRIMARY CARE | Facility: CLINIC | Age: 74
End: 2024-02-16

## 2024-02-16 RX ORDER — AMOXICILLIN AND CLAVULANATE POTASSIUM 875; 125 MG/1; MG/1
875 TABLET, FILM COATED ORAL 2 TIMES DAILY
Qty: 14 TABLET | Refills: 0 | Status: SHIPPED | OUTPATIENT
Start: 2024-02-16 | End: 2024-03-01 | Stop reason: ALTCHOICE

## 2024-02-22 ENCOUNTER — OFFICE VISIT (OUTPATIENT)
Dept: UROLOGY | Facility: CLINIC | Age: 74
End: 2024-02-22
Payer: MEDICARE

## 2024-02-22 DIAGNOSIS — N32.81 OAB (OVERACTIVE BLADDER): Primary | ICD-10-CM

## 2024-02-22 DIAGNOSIS — L28.0 LICHEN SIMPLEX CHRONICUS: ICD-10-CM

## 2024-02-22 PROCEDURE — 99213 OFFICE O/P EST LOW 20 MIN: CPT | Performed by: STUDENT IN AN ORGANIZED HEALTH CARE EDUCATION/TRAINING PROGRAM

## 2024-02-22 PROCEDURE — 1036F TOBACCO NON-USER: CPT | Performed by: STUDENT IN AN ORGANIZED HEALTH CARE EDUCATION/TRAINING PROGRAM

## 2024-02-22 PROCEDURE — 1126F AMNT PAIN NOTED NONE PRSNT: CPT | Performed by: STUDENT IN AN ORGANIZED HEALTH CARE EDUCATION/TRAINING PROGRAM

## 2024-02-22 NOTE — PROGRESS NOTES
"       HISTORY OF PRESENT ILLNESS:  Patty is a 73 y/o female who presents today for a follow up visit status post Botox injection done on 1/8/2024. She is doing well overall.          Past Medical History  She has a past medical history of Personal history of other diseases of the musculoskeletal system and connective tissue and Personal history of other diseases of the respiratory system.    Surgical History  She has a past surgical history that includes Total hip arthroplasty (09/08/2018); Other surgical history (03/04/2020); Other surgical history (03/04/2020); Other surgical history (03/04/2020); Other surgical history (09/18/2018); CT angio abdomen pelvis w and or wo IV IV contrast (5/13/2014); CT angio abdomen pelvis w and or wo IV IV contrast (5/17/2018); CT angio abdomen pelvis w and or wo IV IV contrast (12/5/2019); CT angio abdomen pelvis w and or wo IV IV contrast (12/9/2016); and CT angio abdomen pelvis w and or wo IV IV contrast (8/14/2023).     Social History  She reports that she has never smoked. She has never used smokeless tobacco. No history on file for alcohol use and drug use.    Family History  Family History   Problem Relation Name Age of Onset    Stroke Mother          cerebrovascular accident (CVA)    Heart block Father      Esophageal cancer Brother      Cancer Other      Hypertension Other          Allergies  Benazepril, Hydroxychloroquine, Olmesartan, and Sulfamethoxazole-trimethoprim      A comprehensive 10+ review of systems was negative except for: see hpi                PHYSICAL EXAMINATION:  BP Readings from Last 3 Encounters:   02/15/24 167/80   09/27/23 (!) 165/92   06/20/23 142/60      Wt Readings from Last 3 Encounters:   02/15/24 109 kg (241 lb)   06/20/23 111 kg (244 lb 3 oz)   05/22/23 111 kg (244 lb)      BMI: Estimated body mass index is 40.1 kg/m² as calculated from the following:    Height as of 8/28/23: 1.651 m (5' 5\").    Weight as of 2/15/24: 109 kg (241 lb).  BSA: " "Estimated body surface area is 2.24 meters squared as calculated from the following:    Height as of 8/28/23: 1.651 m (5' 5\").    Weight as of 2/15/24: 109 kg (241 lb).  HEENT: Normocephalic, atraumatic, PER EOMI, nonicteric, trachea normal, thyroid normal, oropharynx normal.  CARDIAC: regular rate & rhythm, S1 & S2 normal.  No heaves, thrills, gallops or murmurs.  LUNGS: Clear to auscultation, no spinal or CV tenderness.  EXTREMITIES: No evidence of cyanosis, clubbing or edema.         Assessment:  73-year-old with mixed incontinence, urgency dominant     JOHN  -Has failed Vesicare and Myrbetriq and Kegel's, bladder training exercises and fluid management  -UDS demonstrates detrusor overactivity  -No improvement with stage 1, explanted 10/26  s/p botox 12/2/22, 7/5/23 with over 90% improvement, 100 units but was short lasting     Botox 200 units 1/8/24, doing well  Follow-up in 3 months    Vulvar discomfort:  Continue lotrisone     Rectocele:   Now having BM sx and pelvic pain will try smooth move tea first   Referred to PT     Follow up in 3 months virtually or sooner if needed    Rufino Vaughan MD  Scribe Attestation  By signing my name below, I, Yara Adelso, Scribe   attest that this documentation has been prepared under the direction and in the presence of Rufino Vaughan MD.  "

## 2024-02-29 ENCOUNTER — APPOINTMENT (OUTPATIENT)
Dept: PHYSICAL THERAPY | Facility: CLINIC | Age: 74
End: 2024-02-29
Payer: MEDICARE

## 2024-02-29 ENCOUNTER — LAB (OUTPATIENT)
Dept: LAB | Facility: LAB | Age: 74
End: 2024-02-29
Payer: MEDICARE

## 2024-02-29 DIAGNOSIS — I72.8 SPLENIC ARTERY ANEURYSM (CMS-HCC): ICD-10-CM

## 2024-02-29 DIAGNOSIS — R73.03 PREDIABETES: ICD-10-CM

## 2024-02-29 DIAGNOSIS — E78.5 HYPERLIPIDEMIA, UNSPECIFIED HYPERLIPIDEMIA TYPE: ICD-10-CM

## 2024-02-29 DIAGNOSIS — E66.9 OBESITY, UNSPECIFIED CLASSIFICATION, UNSPECIFIED OBESITY TYPE, UNSPECIFIED WHETHER SERIOUS COMORBIDITY PRESENT: ICD-10-CM

## 2024-02-29 LAB
ALBUMIN SERPL BCP-MCNC: 4.4 G/DL (ref 3.4–5)
ALP SERPL-CCNC: 60 U/L (ref 33–136)
ALT SERPL W P-5'-P-CCNC: 17 U/L (ref 7–45)
ANION GAP SERPL CALC-SCNC: 15 MMOL/L (ref 10–20)
AST SERPL W P-5'-P-CCNC: 26 U/L (ref 9–39)
BILIRUB SERPL-MCNC: 0.7 MG/DL (ref 0–1.2)
BUN SERPL-MCNC: 35 MG/DL (ref 6–23)
CALCIUM SERPL-MCNC: 9.5 MG/DL (ref 8.6–10.3)
CHLORIDE SERPL-SCNC: 107 MMOL/L (ref 98–107)
CHOLEST SERPL-MCNC: 126 MG/DL (ref 0–199)
CHOLESTEROL/HDL RATIO: 4
CO2 SERPL-SCNC: 21 MMOL/L (ref 21–32)
CREAT SERPL-MCNC: 1.19 MG/DL (ref 0.5–1.05)
EGFRCR SERPLBLD CKD-EPI 2021: 48 ML/MIN/1.73M*2
ERYTHROCYTE [DISTWIDTH] IN BLOOD BY AUTOMATED COUNT: 13.5 % (ref 11.5–14.5)
GLUCOSE SERPL-MCNC: 108 MG/DL (ref 74–99)
HCT VFR BLD AUTO: 35.3 % (ref 36–46)
HDLC SERPL-MCNC: 31.4 MG/DL
HGB BLD-MCNC: 11.8 G/DL (ref 12–16)
LDLC SERPL CALC-MCNC: 61 MG/DL
MCH RBC QN AUTO: 33.4 PG (ref 26–34)
MCHC RBC AUTO-ENTMCNC: 33.4 G/DL (ref 32–36)
MCV RBC AUTO: 100 FL (ref 80–100)
NON HDL CHOLESTEROL: 95 MG/DL (ref 0–149)
NRBC BLD-RTO: 0 /100 WBCS (ref 0–0)
PLATELET # BLD AUTO: 155 X10*3/UL (ref 150–450)
POTASSIUM SERPL-SCNC: 4.2 MMOL/L (ref 3.5–5.3)
PROT SERPL-MCNC: 7.8 G/DL (ref 6.4–8.2)
RBC # BLD AUTO: 3.53 X10*6/UL (ref 4–5.2)
SODIUM SERPL-SCNC: 139 MMOL/L (ref 136–145)
TRIGL SERPL-MCNC: 167 MG/DL (ref 0–149)
VLDL: 33 MG/DL (ref 0–40)
WBC # BLD AUTO: 4.8 X10*3/UL (ref 4.4–11.3)

## 2024-02-29 PROCEDURE — 83036 HEMOGLOBIN GLYCOSYLATED A1C: CPT

## 2024-02-29 PROCEDURE — 80053 COMPREHEN METABOLIC PANEL: CPT

## 2024-02-29 PROCEDURE — 36415 COLL VENOUS BLD VENIPUNCTURE: CPT

## 2024-02-29 PROCEDURE — 85027 COMPLETE CBC AUTOMATED: CPT

## 2024-02-29 PROCEDURE — 80061 LIPID PANEL: CPT

## 2024-03-01 ENCOUNTER — OFFICE VISIT (OUTPATIENT)
Dept: PRIMARY CARE | Facility: CLINIC | Age: 74
End: 2024-03-01
Payer: MEDICARE

## 2024-03-01 VITALS
WEIGHT: 243 LBS | HEART RATE: 59 BPM | OXYGEN SATURATION: 96 % | DIASTOLIC BLOOD PRESSURE: 78 MMHG | HEIGHT: 65 IN | SYSTOLIC BLOOD PRESSURE: 156 MMHG | BODY MASS INDEX: 40.48 KG/M2

## 2024-03-01 DIAGNOSIS — R73.03 PREDIABETES: ICD-10-CM

## 2024-03-01 DIAGNOSIS — N18.31 CHRONIC KIDNEY DISEASE, STAGE 3A (MULTI): ICD-10-CM

## 2024-03-01 DIAGNOSIS — D69.6 THROMBOCYTOPENIA (CMS-HCC): ICD-10-CM

## 2024-03-01 DIAGNOSIS — M35.00 SICCA SYNDROME (MULTI): ICD-10-CM

## 2024-03-01 DIAGNOSIS — J44.9 CHRONIC OBSTRUCTIVE PULMONARY DISEASE, UNSPECIFIED COPD TYPE (MULTI): ICD-10-CM

## 2024-03-01 DIAGNOSIS — E66.9 OBESITY, UNSPECIFIED CLASSIFICATION, UNSPECIFIED OBESITY TYPE, UNSPECIFIED WHETHER SERIOUS COMORBIDITY PRESENT: ICD-10-CM

## 2024-03-01 DIAGNOSIS — I10 PRIMARY HYPERTENSION: ICD-10-CM

## 2024-03-01 DIAGNOSIS — Z00.00 ROUTINE GENERAL MEDICAL EXAMINATION AT HEALTH CARE FACILITY: Primary | ICD-10-CM

## 2024-03-01 DIAGNOSIS — E66.01 OBESITY, MORBID (MULTI): ICD-10-CM

## 2024-03-01 PROBLEM — I72.8 ANEURYSM OF SPLENIC ARTERY (CMS-HCC): Status: RESOLVED | Noted: 2023-04-12 | Resolved: 2024-03-01

## 2024-03-01 PROBLEM — I72.2 RENAL ARTERY ANEURYSM (CMS-HCC): Status: RESOLVED | Noted: 2023-04-12 | Resolved: 2024-03-01

## 2024-03-01 PROCEDURE — 1159F MED LIST DOCD IN RCRD: CPT | Performed by: INTERNAL MEDICINE

## 2024-03-01 PROCEDURE — 1170F FXNL STATUS ASSESSED: CPT | Performed by: INTERNAL MEDICINE

## 2024-03-01 PROCEDURE — 1036F TOBACCO NON-USER: CPT | Performed by: INTERNAL MEDICINE

## 2024-03-01 PROCEDURE — G0439 PPPS, SUBSEQ VISIT: HCPCS | Performed by: INTERNAL MEDICINE

## 2024-03-01 PROCEDURE — 1126F AMNT PAIN NOTED NONE PRSNT: CPT | Performed by: INTERNAL MEDICINE

## 2024-03-01 PROCEDURE — 3078F DIAST BP <80 MM HG: CPT | Performed by: INTERNAL MEDICINE

## 2024-03-01 PROCEDURE — 99214 OFFICE O/P EST MOD 30 MIN: CPT | Performed by: INTERNAL MEDICINE

## 2024-03-01 PROCEDURE — 1160F RVW MEDS BY RX/DR IN RCRD: CPT | Performed by: INTERNAL MEDICINE

## 2024-03-01 PROCEDURE — 3077F SYST BP >= 140 MM HG: CPT | Performed by: INTERNAL MEDICINE

## 2024-03-01 RX ORDER — GUAIFENESIN 600 MG/1
1200 TABLET, EXTENDED RELEASE ORAL 2 TIMES DAILY
COMMUNITY

## 2024-03-01 RX ORDER — AMLODIPINE BESYLATE 10 MG/1
10 TABLET ORAL DAILY
Qty: 90 TABLET | Refills: 3 | Status: SHIPPED | OUTPATIENT
Start: 2024-03-01 | End: 2025-03-01

## 2024-03-01 ASSESSMENT — ACTIVITIES OF DAILY LIVING (ADL)
GROCERY_SHOPPING: INDEPENDENT
DOING_HOUSEWORK: INDEPENDENT
TAKING_MEDICATION: INDEPENDENT
DRESSING: INDEPENDENT
MANAGING_FINANCES: INDEPENDENT
BATHING: INDEPENDENT

## 2024-03-01 ASSESSMENT — PATIENT HEALTH QUESTIONNAIRE - PHQ9
2. FEELING DOWN, DEPRESSED OR HOPELESS: NOT AT ALL
SUM OF ALL RESPONSES TO PHQ9 QUESTIONS 1 AND 2: 0
1. LITTLE INTEREST OR PLEASURE IN DOING THINGS: NOT AT ALL

## 2024-03-01 NOTE — ASSESSMENT & PLAN NOTE
>>ASSESSMENT AND PLAN FOR CHRONIC OBSTRUCTIVE PULMONARY DISEASE, UNSPECIFIED COPD TYPE (MULTI) WRITTEN ON 3/1/2024  3:09 PM BY ROD LOCKWOOD, DO    Stable based on symptoms and exam. Follow established treatment plan. Follow up at least annually.

## 2024-03-01 NOTE — PATIENT INSTRUCTIONS
Stay physically active, mentally active, and socially active for your memory. Get 8-10hrs sleep. Eat a well balanced diet.    Take 10mg of Jardiance daily for your kidney function.     Measure your blood pressure at home.   We will increase amlodipine to 10mg. Finish your currently supply - we will send new 10mg tablets.     [phone] - Dr. Hoff. Limit the salt intake.    Follow up in 6 months.

## 2024-03-01 NOTE — PROGRESS NOTES
"Subjective   Patient ID: Patty Johnson is a 74 y.o. female who presents for Medicare Annual Wellness Visit Subsequent.    HPI   Concerns about kidney function worsening. Reviewed labs with patient and discussed that creatinine has improved.     Neck pain has improved. Still using heating pads. No complaints.     Pt feels like she is also losing her mind. Cannot remember where she is going when she is driving. Is worried about risk of dementia given family history.       Objective   /78   Pulse 59   Ht 1.651 m (5' 5\")   Wt 110 kg (243 lb)   SpO2 96%   BMI 40.44 kg/m²     Physical Exam  Vitals reviewed.   Eyes:      Extraocular Movements: Extraocular movements intact.      Conjunctiva/sclera: Conjunctivae normal.      Pupils: Pupils are equal, round, and reactive to light.   Cardiovascular:      Rate and Rhythm: Normal rate and regular rhythm.      Heart sounds: Normal heart sounds. No murmur heard.     No gallop.   Pulmonary:      Breath sounds: Normal breath sounds.   Abdominal:      General: Abdomen is flat.      Palpations: Abdomen is soft.   Skin:     General: Skin is warm and dry.   Neurological:      Mental Status: Mental status is at baseline.   Psychiatric:         Mood and Affect: Mood normal.         Behavior: Behavior normal.         Assessment/Plan     #HTN  -cont losartan 100mg daily, atenolol-chlorthalidone 100-25mg daily  -increase norvasc to 10mg daily  -advised to measure BP at home, elevated in office today    #Memory concerns  -advised to stay physically active, mentally active, and socially active for your memory. Get 8-10hrs sleep. Eat a well balanced diet..  -Consider MOCA and/or imaging and/or  neuro referral.     #CKD stage 3a/b  -Cont ARB/statin   -will start 10mg Jardiance today  -limit salt intake  -follow up with nephrology    #Left hip osteoarthritis   -left total hip replacement on 8/28  -follows with [facility] - Dr. Rodas   -no complaints    Not discussed " today:    #HLD  -cont simvastatin 20mg daily    #Mixed incontinence / urgency dominant  -follows with Dr. Vaughan  -botox 200 units 1/8/24    #Rectocele  -following with PT / Dr. Vaughan    #Osteopenia  -2000u vit d daily    #Asthma  -follows with pulm  -cont singulair, flonase, proair    #CELIA  -compliant with autobipap    Influenza: 11/3/2021  Shingrex: x2  RSV: 2/19/24  Mammo: 4/26/2022   Colorectal: 07/2015  Dexa: 12/19/2020    RTC 6 months    Martín Vaughan,DO  Internal Medicine, PGY-I

## 2024-03-02 LAB
EST. AVERAGE GLUCOSE BLD GHB EST-MCNC: 114 MG/DL
HBA1C MFR BLD: 5.6 %

## 2024-03-06 NOTE — CONSULTS
Service:   Service: Medicine     Consult:  Consult requested by (Attending Name): Leighton Rodas   Reason: Medical management     History of Present Illness:   Admission Reason: Left Hip Osteoarthritis   HPI:    SUSAN RENEE is a 73 year old Female with a medical history of HTN, CKD III, and OA who [place] [facility] [place] hip arthroplasty by Dr. Rodas.  Reports ongoing, constant, progressive hip pain that failed conservative treatment. Denies injury/trauma. Surgical course uneventful to this point. EBL 100ml. Consulted for medical management. Seen and examined in her room this afternoon. Doing well.  Having surgical discomfort but reports manageable at this time. Intermittent postop N/V. She denies chest pain, breathing difficulties, abdominal pain, N/V/D/C. No fever or chills. Daughter is bringing in home CPAP machine.     Past Medical History   Hypertension  Chronic Kidney Disease, Stage III  Asthma  Obstructive Sleep Apnea  Osteoarthritis  Thrombocytopenia    Past Surgical History   Tonsillectomy  Tubal Ligation  Carpal Tunnel Release - Right  ORIF Right Shoulder  Cataract Excision  Cardiac Catheterization  Sacral Neurostimulator (placement/removal)      Review Family/Social History and ROS:   Family History:  Cancer: yes   CAD: yes   Diabetes: yes   Hyperlipidemia: yes   Hypertension: yes   Stroke: yes     Social History:    Smoking Status: never smoker  (1)   Alcohol Use: occasionally   Drug Use: denies   Occupation: Retired   Social History:    . Lives with spouse. Independent in ADL's. Use cane intermittently.     Constitutional: NEGATIVE: Fever, Chills     Eyes: NEGATIVE: Blurry Vision, Vision Loss/ Change     ENMT: NEGATIVE: Nasal Discharge, Nasal Congestion,  Ear Pain, Mouth Pain, Throat Pain     Cardiac: NEGATIVE: Chest Pain, Dyspnea on Exertion,  Orthopnea, Palpitations, Syncope     Gastrointestinal: POSITIVE: Nausea, Vomiting; NEGATIVE:  Diarrhea,  Constipation, Abdominal Pain     Genitourinary: NEGATIVE: Dysuria, Hematuria;  COMMENTS: Incontinent - baseline     Musculoskeletal: COMMENTS: See HPI     Neurological: POSITIVE: Headache; NEGATIVE: Dizziness,  Confusion, Seizures, Syncope     Psychiatric: NEGATIVE: Mood Changes, Anxiety     Skin: NEGATIVE: Rash, Ulcer     Endocrine: NEGATIVE: Sweat     Hematologic/Lymph: POSITIVE: Bruising; NEGATIVE:  Anemia              Allergies:  ·  benazepril : Other  ·  Benicar : Other  ·  Plaquenil : Other  ·  vancomycin : Rash, Itching  ·  Bactrim : Unknown    Objective:     Objective Information:        T   P  R  BP   MAP  SpO2   Value  36.6  56  16  170/89      97%  Date/Time 8/28 11:43 8/28 11:43 8/28 11:43 8/28 11:43    8/28 11:43  Range  (36.6C - 36.6C )  (56 - 56 )  (16 - 16 )  (170 - 170 )/ (89 - 89 )    (97% - 97% )    Physical Exam Narrative:  ·  Physical Exam:    General: A&O x 3; NAD; calm and cooperative  Eyes: EOM's intact. Clear sclera.   HEENT: Atraumatic. Normocephalic. Mucous membranes moist.   Lungs: CTAB; respirations even and unlabored on room air.   Heart: Regular rate  Abdomen:  Soft, non-tender, non-distended; normoactive bowel sounds  Extremities: FARRIS with LLE weakness/pain; no edema; peripheral pulses intact. + sensation/movement of LLE.   Neuro: A&O x 3; gross motor and sensation intact; no focal deficits  Skin: Warm, dry, and intact. LLE ACE wrap is C/D/I.   Psych: Appropriate mood and behavior     Refresh Home Medications List:  ·  Select to Refresh Home Medication List Refresh Home Medications     Medications:    Medications:          Continuous Medications       --------------------------------    1. Lactated Ringers Infusion:  1000  mL  IntraVenous  <Continuous>    2. Lactated Ringers Infusion:  1000  mL  IntraVenous  <Continuous>         Scheduled Medications       --------------------------------    1. amLODIPine (NORVASC):  5  mg  Oral  Daily    2. Aspirin:  325  mg  Oral  Daily    3.  Cholecalciferol (Vitamin D3):  2000  International Unit(s)  Oral  Daily    4. cycloSPORINE 0.05% Ophthalmic:  1  drop(s)  Both Eyes  2 Times a Day    5. Docusate:  100  mg  Oral  2 Times a Day    6. Fluticasone 50 microgram/ Nasal Inhalation:  1  spray(s)  Each Nostril  Daily    7. Linezolid 600 mg IVPB/ Premixed Soln 300 mL:  300  mL  IntraVenous Piggyback  Every 12 Hours    8. Losartan:  50  mg  Oral  Daily    9. Montelukast:  10  mg  Oral  At Bedtime    10. Multivitamin with Minerals:  1  tablet(s)  Oral  Daily    11. Pantoprazole:  40  mg  Oral  2 Times a Day    12. Polyethylene Glycol:  17  gram(s)  Oral  2 Times a Day    13. Simvastatin:  20  mg  Oral  Every Night         PRN Medications       --------------------------------    1. Baclofen:  10  mg  Oral  3 Times a Day    2. HYDROcodone 5 mg - Acetaminophen 325 m  tablet(s)  Oral  Every 4 Hours    3. HYDROcodone 5 mg - Acetaminophen 325 m  tablet(s)  Oral  Every 4 Hours    4. Morphine Injectable:  2  mg  IntraVenous Push  Every 2 Hours    5. Ondansetron Injectable:  4  mg  IntraVenous Push  Every 6 Hours        Radiology Results:    Results:        Impression:    Status post left total hip arthroplasty with satisfactory alignment  of the hardware. Lucency at the bone metal interface along the medial  femoral component (best seen on cross-table lateral view) is likely  favored to be related to immediate postsurgical status. Recommend  surgical correlation and attention on follow-up imaging.     Xray Hip 1 View [Aug 28 2023 11:01AM]        Assessment:    73 year old Female with a medical history of HTN, CKD III, and OA who presented to [facility] [place] for an elective left total hip arthroplasty by Dr. Rodas. Surgical course  uneventful to this point. EBL 100ml. Consulted for medical management.    Hypertension  -Will resume home regimen with hold parameters: Losartan, Amlodipine, Atenolol  -Hold Chlorthalidone in postop state  -Postop /89    Left Hip  "Osteoarthritis  -s/p left NARESH by Dr. Causey today  -Incisional care, dressing changes, prophylactic antibiotics, and pain management per Orthopedics.   -PT/OT consulted. WBAT.   -Medicate for pain as needed.   -Advised IS use, mobilization.   -Maintain bowel regimen  -DVT prophylaxis per surgery  -Monitor H&H for ABLA.  -CBC in AM.     Obstructive Sleep Apnea/Asthma  -No hypoxia or objective/subjective signs of acute asthma exacerbation.   -Will add duonebs for PRN use  -May use home CPAP    Chronic Kidney Disease, Stage III  -BMP in AM to evaluate renal function  -Avoid nephrotoxic agents     DVT Prophylaxis   -Defer to surgeon.     Disposition  -Plan of care discussed with medicine.   -Discharge per attending.   ->60 minutes spent in chart review, evaluation of patient, and discussion of plan of care with medical staff.     Thank you very much for the consult. Please call/doc halo with any medical issues.     Consult Status:  Consult Status    (select all that apply): initial  consult complete, will follow   Consult Order ID: [id number]       Electronic Signatures:  Lillian Vasquez (APRN-CNP)  (Signed 28-Aug-2023 17:15)   Authored: Service, History of Present Illness, Review  Family/Social History and ROS, Allergies, Objective, Assessment/Recommendations, Note Completion      Last Updated: 28-Aug-2023 17:15 by Lillian Vasquez (APRN-CNP)    References:  1.  Data Referenced From \"Patient Profile - Adult v2\" 28-Aug-2023 11:52   "

## 2024-04-25 ENCOUNTER — OFFICE VISIT (OUTPATIENT)
Dept: PRIMARY CARE | Facility: CLINIC | Age: 74
End: 2024-04-25
Payer: MEDICARE

## 2024-04-25 VITALS
HEIGHT: 65 IN | SYSTOLIC BLOOD PRESSURE: 133 MMHG | WEIGHT: 242 LBS | BODY MASS INDEX: 40.32 KG/M2 | DIASTOLIC BLOOD PRESSURE: 70 MMHG | HEART RATE: 55 BPM

## 2024-04-25 DIAGNOSIS — N18.31 CHRONIC KIDNEY DISEASE, STAGE 3A (MULTI): ICD-10-CM

## 2024-04-25 PROCEDURE — 3078F DIAST BP <80 MM HG: CPT | Performed by: INTERNAL MEDICINE

## 2024-04-25 PROCEDURE — 1036F TOBACCO NON-USER: CPT | Performed by: INTERNAL MEDICINE

## 2024-04-25 PROCEDURE — 99213 OFFICE O/P EST LOW 20 MIN: CPT | Performed by: INTERNAL MEDICINE

## 2024-04-25 PROCEDURE — 3075F SYST BP GE 130 - 139MM HG: CPT | Performed by: INTERNAL MEDICINE

## 2024-04-25 PROCEDURE — 1123F ACP DISCUSS/DSCN MKR DOCD: CPT | Performed by: INTERNAL MEDICINE

## 2024-04-25 PROCEDURE — 1160F RVW MEDS BY RX/DR IN RCRD: CPT | Performed by: INTERNAL MEDICINE

## 2024-04-25 PROCEDURE — 1158F ADVNC CARE PLAN TLK DOCD: CPT | Performed by: INTERNAL MEDICINE

## 2024-04-25 PROCEDURE — 1159F MED LIST DOCD IN RCRD: CPT | Performed by: INTERNAL MEDICINE

## 2024-04-25 NOTE — PROGRESS NOTES
Subjective   Patient ID: Patty Johnson is a 74 y.o. y/o female who presents to with a chief complaint of 1 month follow up.     HPI  Blood pressure good in office today. Similar to blood pressure measurements at home.     Requested refill for Jardiance.    Memory has been good. Has been getting sleep. Staying socially active.     Will start pelvic floor exercise with PT at the recommendation with Dr. Vaughan.       Objective   Vitals:    04/25/24 1335   BP: 133/70   Pulse: 55       Physical Exam  Vitals reviewed.   HENT:      Head: Normocephalic.   Eyes:      Pupils: Pupils are equal, round, and reactive to light.   Cardiovascular:      Rate and Rhythm: Normal rate and regular rhythm.      Heart sounds: Normal heart sounds. No murmur heard.     No gallop.   Pulmonary:      Effort: No respiratory distress.      Breath sounds: Normal breath sounds.   Abdominal:      General: Abdomen is flat. There is no distension.      Palpations: Abdomen is soft.      Tenderness: There is no abdominal tenderness.   Musculoskeletal:      Right lower leg: No edema.      Left lower leg: No edema.   Skin:     General: Skin is warm and dry.   Neurological:      Mental Status: She is alert. Mental status is at baseline.   Psychiatric:         Mood and Affect: Mood normal.         Behavior: Behavior normal.         Assessment/Plan   Problem List Items Addressed This Visit    #HTN  -well controlled at home  -cont losartan 100mg daily, atenolol-chlorthalidone 100-25mg daily  -cont norvasc to 10mg daily    #Memory concerns  -advised to stay physically active, mentally active, and socially active for your memory. Get 8-10hrs sleep. Eat a well balanced diet.  -Consider MOCA and/or imaging and/or neuro referral if worsening     #CKD stage 3a/b  -Cont ARB/statin   -cont 10mg Jardiance today  -limit salt intake  -follow up with nephrology  -refilled Jardiance today     #Left hip osteoarthritis   -left total hip replacement on 8/28  -follows with  [facility] - Dr. Rodas   -no complaints     #HLD  -cont simvastatin 20mg daily     #Mixed incontinence / urgency dominant  -follows with Dr. Vaughan  -botox 200 units 1/8/24  -will do pelvic floor exercise.      #Rectocele  -following with PT / Dr. Vaughan     #Osteopenia  -2000u vit d daily     #Asthma  -follows with pulm  -cont singulair, flonase, proair     #CELIA  -compliant with autobipap     Influenza: 11/3/2021  Shingrex: x2  RSV: 2/19/24  PCV 13: x2  Prevnar 20: recommended  Mammo: 4/26/2022   Colorectal: 07/2015  Dexa: 12/19/2020     RTC 6 months     Martín Vaughan,DO  Internal Medicine, PGY-I

## 2024-05-01 ENCOUNTER — EVALUATION (OUTPATIENT)
Dept: PHYSICAL THERAPY | Facility: CLINIC | Age: 74
End: 2024-05-01
Payer: MEDICARE

## 2024-05-01 DIAGNOSIS — R10.2 PELVIC PAIN: ICD-10-CM

## 2024-05-01 DIAGNOSIS — M62.81 MUSCLE WEAKNESS: Primary | ICD-10-CM

## 2024-05-01 DIAGNOSIS — R32 URINARY INCONTINENCE, UNSPECIFIED TYPE: ICD-10-CM

## 2024-05-01 PROCEDURE — 97162 PT EVAL MOD COMPLEX 30 MIN: CPT | Mod: GP | Performed by: PHYSICAL THERAPIST

## 2024-05-01 PROCEDURE — 97530 THERAPEUTIC ACTIVITIES: CPT | Mod: GP | Performed by: PHYSICAL THERAPIST

## 2024-05-01 ASSESSMENT — ENCOUNTER SYMPTOMS: OCCASIONAL FEELINGS OF UNSTEADINESS: 1

## 2024-05-01 NOTE — LETTER
May 3, 2024    [place]  [id number]   [facility]  [facility] [address]    Patient: Patty Johnson   YOB: 1950   Date of Visit: 5/1/2024       Dear Rufino Vaughan MD  [address] [facility]  [place] [address]    The attached plan of care is being sent to you because your patient’s medical reimbursement requires that you certify the plan of care. Your signature is required to allow uninterrupted insurance coverage.      You may indicate your approval by signing below and faxing this form back to us at Dept Fax: [phone].    Please call Dept: [phone] with any questions or concerns.    Thank you for this referral,        Asya Bernabe, PT  [facility] [address] [facility]  [address] [facility]  [place] [address]    Payer: Payor: [employer] MEDICARE / Plan: [employer] MEDICARE / Product Type: *No Product type* /                                                                         Date:     Dear Asya Bernabe, PT,     Re: Ms. Patty Johnson, MRN:[id number]    I certify that I have reviewed the attached plan of care and it is medically necessary for Ms. Patty Johnson (1950) who is under my care.          ______________________________________                    _________________  Provider name and credentials                                           Date and time                                                                                           Plan of Care 5/1/24   Effective from: 5/1/2024  Effective to: 6/28/2024    Plan ID: [id number]            Participants as of Finalize on 5/3/2024    Name Type Comments Contact Info    Rufino Vaughan MD Referring Provider  [phone]    Asya Bernabe PT Physical Therapist  [phone]       Last Plan Note     Author: Asya Bernabe PT Status: Incomplete Last edited: 5/1/2024 11:15 AM       Physical Therapy    PELVIC FLOOR EVALUATION AND  TREATMENT    Name: Patty Johnson  MRN: [id number]  : 1950  Today's Date: 24     Time Calculation  Start Time: 1118  Stop Time: 1220  Time Calculation (min): 62 min  11:18-12:20  Assessment: The patient is a 74 y.o. female, with history of overactive bladder, presenting with chief complaint of urinary urgency, frequency, incontinence and nocturia.  Patient was recently having trouble passing bowel movements, but is now resolved with use of fiber supplements and stool softener.  She does have mild fecal urgency, but no incontinence.  She has found great relief with botox injections.  The evaluation focused on education today and assessing hip and core strength.  A pelvic floor muscle assessment will be completed at the next session per patient comfort level and consent.  She presents with significant muscular weakness in hips and core.  Due to this weakness, bed mobility and sit to stand transfers are quite difficult and she was found to be holding her breath to perform these activities.  She was educated today on techniques to manage intra-abdominal pressure using exhalation and abdominal bracing to reduce urinary leakage during these functional tasks.  Return of LE edema toward the urinary system at night may contribute to nocturia.  She will likely benefit from skilled PT to gain strength and pressure management skills to assist with urinary control, rectocele management, ease of bladder emptying to improve her sleep quality and overall quality of life.         Plan:   Treatment/Interventions: Biofeedback, Education/ Instruction, Electrical stimulation, Neuromuscular re-education, Therapeutic activities, Therapeutic exercises  PT Plan: Skilled PT  PT Frequency: 1 time per week  Duration: 6-8 weeks  Onset Date: 24  Certification Period Start Date: 24  Certification Period End Date: 24  Number of Treatments Authorized: No authorization  Rehab Potential: Good  Plan of Care Agreement:  Patient    Interventions: Therapeutic Activity: Pt was educated on PT clinical findings, extensively pelvic anatomy as it relates to symptoms and diagnosis, PT POC and initial HEP with oral and written instruction provided. Patient was instructed to avoid breath holding with bed mobility and sit to stand transfers, rather exhale and engage abdominals as she moves.  She was educated on importance of avoiding pushing to completely empty her bladder, rather use proper posture and breathing techniques instead to avoid increasing prolapse symptoms.  She was educated on the importance of keeping bowels moving well to reduce prolapse symptoms, improving daily water intake to support this.       Current Problem:  1. Muscle weakness        2. Pelvic pain  Referral to Physical Therapy    Follow Up In Physical Therapy      3. Urinary incontinence, unspecified type            Subjective  General  Reason for Referral: R10.2 (ICD-10-CM) - Pelvic pain  Referred By: Dr. Vaughan  Past Medical History Relevant to Rehab: HTN, Asthma, OA, Kidney Disease  Precautions: L NARESH: 8/23, R NARESH 2/15, fell and broke right shoulder 2 years ago and lacks ROM, hands go numb at night and when using phone, STEADi 6        Pain: no pelvic pain       Objective  PELVIC HISTORY:  Chief Complaint/Description of Symptoms:  Patient reports that she thinks she has a rectal prolapse.  She was blocked up at one point and struggling with bowel movements, but is no longer having that issue, as she is taking a stool softener at night and Fiber supplement during day.  This began after hip surgery last year due to be on pain meds,  now resolved.  Has struggled a lot with OAB - now has much better urinary control with Botox injections.  Last one he used 2x the Botox and that worked very well. Could sleep 6 hours, where before she would get up every 1-2 hours. Her leakage resolved.  Had a stimulator implanted but it was not effective, so had to be removed.    Next  "botox is scheduled for next week because she is regressed and now getting up every 4 hours, and leakage has returned. Must wear a pad just in case and at night.  When she moves to get up at night she leaks.  Medications in the past haven't been helpful. Prior to Botox she had no control. Patient reports some history of vulvar irritation, but used a cream, which helped.  Hasn't hd to use it in awhile.  Wears compression stockings due to LE edema.    HPI: Delivered 3 children, all vaginal births, had tearing.  Hx of d&C, She reports that she doesn't walk well.  She did not do therapy after her last NARESH  Home Environment/Social Factors/Occupation: Retired, , Multi story home.  Shower is upstairs, has 1 HR. 6 steps to enter with 2 HR, but they are very wide and can only use one.      PELVIC PAIN: No pain       Since onset, the symptoms are: improving with Botox  Pain when emptying bladder: No  Pain with wiping or tight clothing: No, occasionally has some burning - used a cream, but has not had to use it in awhile.    Pain with intercourse: not currently active, no pain in the past    EXERCISE:  Do you do Kegels? no   Current exercise regime: no    BLADDER:     Excessive Urinary Urgency:  not really  Daytime Voiding Frequency: every 4 hours \" I don't drink enough water.\"   Nighttime Voiding Frequency: every 4 hours (was every 6 hrs)  Unintentional urine loss frequency: every time she turns over  or sits up   Leakage occurs with: sit up and then stands up (no leakage in January after Botox)  Leakage amount: small  Difficulty initiating flow of urine: no  Slow/weak urine stream: strong   Difficulty starting urine stream/push to urinate: see below  Able to completely empty bladder: sometimes has to wait 30 seconds and then pushes to get the rest out  Frequent UTI's: rarely (in the last couple of years    BOWEL:     Excessive Bowel Urgency: Yes  BM Frequency: generally daily, multiple times per day  Frequent Diarrhea: " NO  Frequent constipation/straining/incomplete emptying: once in awhile  [place] Stool Scale rating: small balls most recently.   Unintentional stool loss: no  Stool loss frequency: NA  FI occurs with what activity: NA  FI amount: NA  Consistency of stool when FI occurs: NA  Tests performed by doctor: last colonoscopy over 5 years ago.          INTERNAL VAGINAL EXAMINATION: TBA    POSTURE: Fwd head, rounded shoulders,  Gait:  Independent without a.d., decreased speed, [place]  Transfers: multiple attempts and heavy UE support required for sit to stand transfers, breath holding  Bed mobility: independent, takes increased time, uses log roll     MMT R/L:  Hip flex: 4/4-  Hip abd: 4-/3+  Hip ext: good bridge/good bridge  TA: able to engage      OUTCOMES MEASURE:  PFIQ-7: 18.9    Education:  Outpatient Education  Individual(s) Educated: Patient  Education Provided: Anatomy, Body Mechanics, Home Exercise Program, Physiology, POC  Plan of Care Discussed and Agreed Upon: yes  Patient Response to Education: Patient/Caregiver Verbalized Understanding of Information, Patient/Caregiver Performed Return Demonstration of Exercises/Activities, Patient/Caregiver Asked Appropriate Questions    Careplan Goals:  Problem: Pelvic Floor       Goal: Patient will improve score on PFIQ-7 by 12%  to show a clinically important change in urinary control and function.           Goal: Patient will be independent with pressure management strategies to reduce urinary incontinence and to avoid rectocele/prolapse symptoms.          Problem: Pelvic Floor        Goal: Patient will report ability to perform bed mobility and sit to stand transfers without urinary leakage 90% of the time.           Goal: Patient will normalize day-time voids to every 2-4 hours.           Goal: Patient will report reduction of nocturia to 2x per night or better.          Goal: Patient will demonstrate appropriate fluid management/water intake  for bladder and bowel  health.           Goal: Patient will report increased ease of bladder and bowel emptying without strain.           Goal: Patient will increase hip and trunk strength by 1/3 MMT grade to facilitate muscle performance necessary for ease of functional transfers and urinary control.             Goal: Patient will be independent and adherent to HEP to enhance functional progress and long term management of condition.            Asya Bernabe PT         Current Participants as of 5/3/2024    Name Type Comments Contact Info    Rufino Vaughan MD Referring Provider  [phone]    Signature pending    Asya Bernabe PT Physical Therapist  [phone]    Signature pending

## 2024-05-02 ENCOUNTER — OFFICE VISIT (OUTPATIENT)
Dept: NEPHROLOGY | Facility: CLINIC | Age: 74
End: 2024-05-02
Payer: MEDICARE

## 2024-05-02 VITALS
HEIGHT: 66 IN | WEIGHT: 242 LBS | TEMPERATURE: 96.9 F | SYSTOLIC BLOOD PRESSURE: 110 MMHG | DIASTOLIC BLOOD PRESSURE: 58 MMHG | RESPIRATION RATE: 16 BRPM | OXYGEN SATURATION: 97 % | HEART RATE: 52 BPM | BODY MASS INDEX: 38.89 KG/M2

## 2024-05-02 DIAGNOSIS — I10 PRIMARY HYPERTENSION: ICD-10-CM

## 2024-05-02 DIAGNOSIS — N18.31 CHRONIC KIDNEY DISEASE, STAGE 3A (MULTI): Primary | ICD-10-CM

## 2024-05-02 PROCEDURE — 1159F MED LIST DOCD IN RCRD: CPT | Performed by: INTERNAL MEDICINE

## 2024-05-02 PROCEDURE — 99214 OFFICE O/P EST MOD 30 MIN: CPT | Performed by: INTERNAL MEDICINE

## 2024-05-02 PROCEDURE — 1123F ACP DISCUSS/DSCN MKR DOCD: CPT | Performed by: INTERNAL MEDICINE

## 2024-05-02 PROCEDURE — 3078F DIAST BP <80 MM HG: CPT | Performed by: INTERNAL MEDICINE

## 2024-05-02 PROCEDURE — 1160F RVW MEDS BY RX/DR IN RCRD: CPT | Performed by: INTERNAL MEDICINE

## 2024-05-02 PROCEDURE — 3074F SYST BP LT 130 MM HG: CPT | Performed by: INTERNAL MEDICINE

## 2024-05-02 NOTE — PROGRESS NOTES
"Subjective       Patty Johnson is a 74 y.o. female who has past medical history of  CKD3, CELIA, asthma, HTN presents for CKD follow up.     Presents today accompanied by .  Last office visit was September 2022.  Has had a right shoulder injury currently pending repair.      Patient had recent blood work February 2024.  Currently taking Jardiance, however interested in switching to Farxiga as this was better covered with their insurance.      Patient wondering if it is possible to maintain closer laboratory follow up for kidneys.       Per prior notes  Elly came alone today. Her  is one of my patients. She is aware of chronic kidney disease. In summer 2022 she broke her shoulder. Her blood pressure is not in target and losartan was added/started around that time. She gets frequent x-ray on the right shoulder. Recently incidentally she was found to have a right pleural effusion. She has been to get repeat chest x-ray later today. She reports shortness of breath with exertion. She has chronic leg swelling that gets better with compression socks. She has chronic urine incontinence and follow-up closely with urology. She failed conventional therapy with plans for either Botox injection or bladder pacer. She had recurrent UTIs but currently she denies Laurion tract symptoms or pain or burning with urination. She reports significant history of NSAID use that was discontinued recently per her PCP     Social history: , non-smoker, no drugs  Family history: No kidney issues run in the family  Surgical history: Right hip replacement     Objective   /58 (BP Location: Left arm, Patient Position: Standing, BP Cuff Size: Large adult)   Pulse 52   Temp 36.1 °C (96.9 °F)   Resp 16   Ht 1.676 m (5' 6\")   Wt 110 kg (242 lb)   SpO2 97%   BMI 39.06 kg/m²   Wt Readings from Last 3 Encounters:   05/02/24 110 kg (242 lb)   04/25/24 110 kg (242 lb)   03/01/24 110 kg (243 lb)       Physical " "Exam    General appearance: no distress awake and alert on room air, euvolemic on exam  Eyes: non-icteric  HEENT: atrumatic head, PEERLA, moist mucosa  Skin: no apparent rash  Heart: NSR, S1, S2 normal, no murmur or gallop  Lungs: Symmetrical expansion,CTA bilat no wheezing/crackles  Abdomen: soft, nt/nd, obese  Extremities: no edema bilat.  Tender to palpation bilateral calves.   Neuro: No FND,asterixis, no focal deficits noticed        Review of Systems     Constitutional: no fever, no chills, no recent weight gain and no recent weight loss.   Eyes: no blurred vision and no diplopia.   ENT: no hearing loss, no earache, no sore throat, no swollen glands in the neck and no nasal discharge.   Cardiovascular: no chest pain, no palpitations and no lower extremity edema.   Respiratory: no shortness of breath, no chronic cough and no shortness of breath during exertion.   Gastrointestinal: no abdominal pain, no constipation, no heartburn, no vomiting, no bloody stools and no change in bowel movements.   Genitourinary: no dysuria and no hematuria.   Musculoskeletal: no arthralgias and no myalgias.   Skin: no rashes and no skin lesions.   Neurological: no headaches and no dizziness.   Psychiatric: no confusion, no depression and no anxiety.   Endocrine: no heat intolerance, no cold intolerance, appetite not increased, no thyroid disorder, no increased urinary frequency and no dry skin.   Hematologic/Lymphatic: does not bleed easily and does not bruise easily.   All other systems have been reviewed and are negative for complaint.         Data Review                   Lab Results   Component Value Date    URICACID 9.5 (H) 09/27/2022           Lab Results   Component Value Date    HGBA1C 5.6 02/29/2024                 No lab exists for component: \"CR\", \"PHOSPHORUS\"        Albumin/Creatine Ratio   Date Value Ref Range Status   09/27/2022 117.4 (H) 0.0 - 30.0 ug/mg crt Final            RFP  Recent Labs     02/29/24  1204 " 09/01/23  0807 08/30/23  0921 08/29/23  0517 08/16/23  0830 03/23/23  1310 09/27/22  1543    138 135* 133* 139 140 141   K 4.2 4.3 3.8 3.7 3.9 4.2 4.5    105 103 103 104 106 107   CO2 21 25 22 22 26 23 23   BUN 35* 38* 24* 26* 29* 32* 37*   CREATININE 1.19* 1.33* 1.33* 1.07* 1.10* 1.13* 1.28*   GLUCOSE 108* 109* 160* 121* 142* 103* 115*   CALCIUM 9.5 9.2 8.7 8.7 9.2 9.8 9.5   PHOS  --  4.1  --   --  3.6 4.0 4.3   EGFR 48*  --   --   --   --   --   --    ANIONGAP 15 12 14 12 13 15 16        Urineanalysis  Recent Labs     01/08/24  1110 08/16/23  0812 09/27/22  1543 06/09/22  1557 09/13/18  0539   COLORU Yellow YELLOW YELLOW YELLOW YELLOW   APPEARANCEU Clear CLEAR HAZY CLEAR CLEAR   SPECGRAVU 1.020 1.017 1.014 1.017 1.010   JORDAN 5.5 6.0 5.0 5.0 5.0   PROTUR 30 (1+) NEGATIVE 30*  NEGATIVE NEGATIVE NEGATIVE   GLUCOSEU NEGATIVE NEGATIVE NEGATIVE NEGATIVE NEGATIVE   BLOODU TRACE-Lysed* NEGATIVE SMALL(1+)* NEGATIVE NEGATIVE   KETONESU NEGATIVE NEGATIVE NEGATIVE NEGATIVE NEGATIVE   BILIRUBINU NEGATIVE NEGATIVE NEGATIVE NEGATIVE NEGATIVE   NITRITEU NEGATIVE NEGATIVE POSITIVE* NEGATIVE NEGATIVE   LEUKOCYTESU  --  SMALL(1+)* LARGE(3+)* MODERATE (2+)* NEGATIVE       Urine Electrolytes  Recent Labs     01/08/24  1110 08/16/23  0812 09/27/22  1543 06/09/22  1557 09/13/18  0539   CREATU  --   --  82.6  82.6  --   --    PROTUR 30 (1+) NEGATIVE 30*  NEGATIVE NEGATIVE NEGATIVE   UTPCR  --   --  0.36*  --   --    MICROALBCREA  --   --  117.4*  --   --         Urine Micro  Recent Labs     08/16/23  0812 09/27/22  1543 06/09/22  1557   WBCU 7* 64* 18*   RBCU 1 3 5   HYALCASTU  --   --  OCC*   SQUAMEPIU 1 2 5   BACTERIAU  --  1+*  --    MUCUSU FEW FEW 1+        Iron  Recent Labs     09/27/22  1543 06/07/22  1335   IRON 69 70   TIBC 324 370   IRONSAT 21* 19*   FERRITIN 75 46          Current Outpatient Medications on File Prior to Visit   Medication Sig Dispense Refill    allopurinol (Zyloprim) 100 mg tablet TAKE 1 TABLET BY  MOUTH DAILY AS  DIRECTED 100 tablet 2    amLODIPine (Norvasc) 10 mg tablet Take 1 tablet (10 mg) by mouth once daily. 90 tablet 3    aspirin 81 mg EC tablet Take 1 tablet (81 mg) by mouth once daily.      atenoloL-chlorthalidone (Tenoretic) 100-25 mg tablet TAKE 1 TABLET BY MOUTH DAILY 100 tablet 1    cholecalciferol (Vitamin D-3) 5,000 Units tablet Take 1 tablet (5,000 Units) by mouth once daily.      cycloSPORINE (Restasis) 0.05 % ophthalmic emulsion Administer 1 drop into both eyes every 12 hours.      empagliflozin (Jardiance) 10 mg Take 1 tablet (10 mg) by mouth once daily. 90 tablet 3    fish oil concentrate (Omega-3) 120-180 mg capsule Take 2 capsules (2 g) by mouth 2 times a day before meals.      losartan (Cozaar) 100 mg tablet TAKE 1 TABLET BY MOUTH ONCE  DAILY 100 tablet 2    milk thistle seed extract 175 mg capsule Milk Thistle 175 MG Oral Capsule   Refills: 0        Start : 18-Sep-2018   Active      miscellaneous medical supply (Blood Pressure Cuff) misc 1 each 2 times a day. 1 each 0    montelukast (Singulair) 10 mg tablet TAKE 1 TABLET BY MOUTH AT  BEDTIME 100 tablet 2    mv-min-folic acid-lutein (Essential Woman 50 Plus) 0.4-250 mg-mcg tablet Take 1 tablet by mouth once daily. Multi For Her 50+      omeprazole (PriLOSEC) 40 mg DR capsule TAKE 1 CAPSULE BY MOUTH TWICE  DAILY 200 capsule 1    simvastatin (Zocor) 20 mg tablet TAKE 1 TABLET BY MOUTH IN THE  EVENING 100 tablet 1    guaiFENesin (Mucinex) 600 mg 12 hr tablet Take 2 tablets (1,200 mg) by mouth 2 times a day. Do not crush, chew, or split.       Current Facility-Administered Medications on File Prior to Visit   Medication Dose Route Frequency Provider Last Rate Last Admin    onabotulinumtoxinA (Botox) injection 200 Units  200 Units intramuscular Once Rufino Vaughan MD               Assessment and Plan       Patty Johnson is a 74 y.o. female who has past medical history of  CKD3, CELIA, asthma, HTN presents for CKD follow up.       # Chronic  kidney disease - G3aA1   - Baseline SCr 1-1.2 in 2022  - Most recent Scr 1.19 in February 2024   - Most likely related to   - Urine dipstick in office today showed xx Albumin, xx blood, xx LE  - Prior urine analysis showed no protien in urine, trace blood, microscopy not available  - Prior kidney US showed   - on losartan 100 mg, Jardiance 10 mg  - okay to change to Farxiga 10 mg if desired.  Would be an increase dose from her current jardiance, however Jardiance 10 mg more for diabetes and 25 mg used for renal protection.       # BP - today at office 117/51 orthostatic vitals 110/58  - Current meds: amlodipine 10 mg, atenolol-chlorthalidone 100-25,   - No Changes       #Anemia Hb 11.8  - could obtain iron studies    #(CKD)-MBD  - Ca 9.5  - pth, vit d, phos not available.    # [facility]  - On statins    #Acidosis   - HCO3     #Others  - No NSAIDs, no contrast as possible. If to be done- we recommend holding ACEi/ARBS/diuretics 24 hrs prior to contrast exposure and ensure appropriate hydration   - Ensure well hydration  - Limit salt in diet  - No smoking    Patient received CKD education and counselling    Kidney function appears to be largely stable at this time.  Return to nephrology clinic in 1 year, sooner if needed.

## 2024-05-02 NOTE — PATIENT INSTRUCTIONS
Dear SUSAN   It was nice seeing you in the nephrology clinic today     Today we discussed the following:     #Chronic kidney disease stage III baseline 40-50%.  We are currently at your baseline 48%.. Please work on hydrating. Consume at least 50 ounces of fluid daily.  Avoid NSAIDs - this is a class of medications that can harm your kidneys - like Advil, Aleve, Ibuprofen, Motrin, Meloxicam        #Hypertension-continue same medication            Follow-up in 12 months with repeat blood work and urinalysis prior to next visit     Please call our office if you have any question  Thank you for coming to see me today     Roselia Barba MD, MS, LEANNE TALLEY  Clinical  - [employer]  Nephrologist - [facility] - [facility]

## 2024-05-03 PROBLEM — M62.81 MUSCLE WEAKNESS: Status: ACTIVE | Noted: 2024-05-03

## 2024-05-03 ASSESSMENT — ENCOUNTER SYMPTOMS
LOSS OF SENSATION IN FEET: 0
DEPRESSION: 0

## 2024-05-06 NOTE — OP NOTE
PREOPERATIVE DIAGNOSIS:  Osteoarthritis, left hip.    POSTOPERATIVE DIAGNOSIS:  Osteoarthritis, left hip.    OPERATION/PROCEDURE:  Left total hip replacement (DePuy, Corail stem, [place] Gription  cup).     SURGEON:  Leighton Rodas DO.    ASSISTANT(S):  Fabricio Abad SA, and ZEB Son.    ANESTHESIA:  Spinal.    PATHOLOGY:  This 73-year-old female complained of debilitating pain in the left  hip and groin.  She had positive provocative test.  Physical exam  findings and radiographs confirmed advanced arthritis in the left hip  joint.  She had undergone prior right hip replacement, which is doing  well and elected to proceed with left hip replacement having failed  conservative options.  At the time of surgery, severe osteoarthritis  was noted in the left hip joint.     DESCRIPTION OF PROCEDURE:  The patient was brought to operative suite.  Satisfactory spinal  anesthetic was administered by Department of Anesthesia.  The patient  was placed in the lateral recumbent position with the left hip up.  Landmarks were identified.  Incision was carried out over the  posterior aspect of the left hip, which had been sterilely draped and  prepped free in routine surgical fashion.  Dissection carried down  through several layers of adipose tissues to the fascia.  The fascia  was incised in similar fashion.  The gluteus kristal musculature was  split using blunt finger dissection.  Posterior approach was carried  out to the hip joint.  Electrocautery was used to release the  external rotator musculature and posterior capsule from bone.  This  tissue was tagged using two #5 Ethibond sutures for closure at the  end of the case.  T capsulotomy was performed.  The arthritic femoral  head was dislocated posteriorly.  The above pathology was noted.  Careful dissection was carried out around the acetabulum.  Care was  taken to identify and protect neurovascular structures.  A Ranawat  retractor was placed  anteriorly.  Acetabular reaming was performed  starting at size 47 mm going up by 1 mm increments to size 51.  Good  cancellous bleeding bone was encountered.  A size 51 trial stem was  placed and found to fit well.  Therefore, a size 52 [place] Gription  cup was implanted.  Excellent fixation was noted.  A size 52 x 36 +4,  10 degree polyethylene liner was secured in the acetabulum.  Good  fixation was appreciated.  Attention was then directed to the femoral  side, where a box cutting broach followed by a femoral canal find was  used.  Broaching was performed starting at size 8, going up by 1 mm  increments to size 11.  The size 11 was found to fit well.  This was  left in place as a trial.  A calcar mill was used to remove residual  proximal bone.  Trialing was performed using a 36+ 1.5 head, which  was found to re-create equal leg lengths and good stability.  Trial  components were removed.  Copious irrigation was performed.  A size  11 collared Corail stem was implanted.  Excellent fixation was noted.   A size 36, +1.5 cobalt chrome femoral head was inserted on the  trunnion of the prosthesis.  The hip was reduced, again taken through  a full range of motion, found to have excellent motion and stability.   Copious lavage was performed.  Layered closure was carried out.  Two  drill holes were placed in the trochanteric region for reattachment  of the external rotator musculature and posterior capsule using the  previously tagged sutures.  The fascia was closed using running  locked #1 undyed Vicryl, and the subcutaneous tissues were  approximated using 2-0 undyed Vicryl followed by a 3-0 subcuticular  V-Loc for the skin edge.  Dermabond, Steri-Strips, and Mepilex silver  dressing were applied.  The patient was transferred to Recovery  having tolerated procedure well.       Leighton Rodas DO    DD:  08/28/2023 13:42:05 EST  DT:  08/29/2023 02:39:14 EST  DICTATION NUMBER:  [id number]  INTERNAL JOB NUMBER:   [id number]    CC:  Leighton Rodas DO, Fax: [phone]        Electronic Signatures:  Leighton Rodas (MD) (Signed on 29-Aug-2023 07:24)   Authored  Unsigned, Draft (SYS GENERATED) (Entered on 29-Aug-2023 02:39)   Entered    Last Updated: 29-Aug-2023 07:24 by Leighton Rodas)

## 2024-05-09 ENCOUNTER — TREATMENT (OUTPATIENT)
Dept: PHYSICAL THERAPY | Facility: CLINIC | Age: 74
End: 2024-05-09
Payer: MEDICARE

## 2024-05-09 ENCOUNTER — PROCEDURE VISIT (OUTPATIENT)
Dept: UROLOGY | Facility: CLINIC | Age: 74
End: 2024-05-09
Payer: MEDICARE

## 2024-05-09 DIAGNOSIS — R10.2 PELVIC PAIN: ICD-10-CM

## 2024-05-09 DIAGNOSIS — N32.81 OAB (OVERACTIVE BLADDER): Primary | ICD-10-CM

## 2024-05-09 LAB
POC APPEARANCE, URINE: CLEAR
POC BILIRUBIN, URINE: NEGATIVE
POC BLOOD, URINE: NEGATIVE
POC COLOR, URINE: YELLOW
POC GLUCOSE, URINE: ABNORMAL MG/DL
POC KETONES, URINE: NEGATIVE MG/DL
POC LEUKOCYTES, URINE: NEGATIVE
POC NITRITE,URINE: NEGATIVE
POC PH, URINE: 5.5 PH
POC PROTEIN, URINE: NEGATIVE MG/DL
POC SPECIFIC GRAVITY, URINE: 1.02
POC UROBILINOGEN, URINE: 0.2 EU/DL

## 2024-05-09 PROCEDURE — 52287 CYSTOSCOPY CHEMODENERVATION: CPT | Performed by: STUDENT IN AN ORGANIZED HEALTH CARE EDUCATION/TRAINING PROGRAM

## 2024-05-09 PROCEDURE — 97140 MANUAL THERAPY 1/> REGIONS: CPT | Mod: GP | Performed by: PHYSICAL THERAPIST

## 2024-05-09 PROCEDURE — 97110 THERAPEUTIC EXERCISES: CPT | Mod: GP | Performed by: PHYSICAL THERAPIST

## 2024-05-09 PROCEDURE — 97530 THERAPEUTIC ACTIVITIES: CPT | Mod: GP,59 | Performed by: PHYSICAL THERAPIST

## 2024-05-09 RX ORDER — LIDOCAINE HYDROCHLORIDE 10 MG/ML
50 INJECTION INFILTRATION; PERINEURAL ONCE
Status: COMPLETED | OUTPATIENT
Start: 2024-05-09 | End: 2024-05-09

## 2024-05-09 RX ORDER — NITROFURANTOIN 25; 75 MG/1; MG/1
100 CAPSULE ORAL 2 TIMES DAILY
Qty: 6 CAPSULE | Refills: 0 | Status: SHIPPED | OUTPATIENT
Start: 2024-05-09 | End: 2024-05-12

## 2024-05-09 RX ORDER — SODIUM BICARBONATE 1 MEQ/ML
10 SYRINGE (ML) INTRAVENOUS ONCE
Status: COMPLETED | OUTPATIENT
Start: 2024-05-09 | End: 2024-05-09

## 2024-05-09 RX ADMIN — LIDOCAINE HYDROCHLORIDE 500 MG: 10 INJECTION INFILTRATION; PERINEURAL at 11:32

## 2024-05-09 RX ADMIN — Medication 10 MEQ: at 15:58

## 2024-05-09 NOTE — PROGRESS NOTES
"HISTORY OF PRESENT ILLNESS:  Patty Johnson is a 74 y.o. female who presents today for a botox injection.          Past Medical History  She has a past medical history of Personal history of other diseases of the musculoskeletal system and connective tissue and Personal history of other diseases of the respiratory system.    Surgical History  She has a past surgical history that includes Total hip arthroplasty (09/08/2018); Other surgical history (03/04/2020); Other surgical history (03/04/2020); Other surgical history (03/04/2020); Other surgical history (09/18/2018); CT angio abdomen pelvis w and or wo IV IV contrast (5/13/2014); CT angio abdomen pelvis w and or wo IV IV contrast (5/17/2018); CT angio abdomen pelvis w and or wo IV IV contrast (12/5/2019); CT angio abdomen pelvis w and or wo IV IV contrast (12/9/2016); and CT angio abdomen pelvis w and or wo IV IV contrast (8/14/2023).     Social History  She reports that she has never smoked. She has never used smokeless tobacco. No history on file for alcohol use and drug use.    Family History  Family History   Problem Relation Name Age of Onset    Stroke Mother          cerebrovascular accident (CVA)    Heart block Father      Esophageal cancer Brother      Cancer Other      Hypertension Other          Allergies  Benazepril, Hydroxychloroquine, Olmesartan, and Sulfamethoxazole-trimethoprim      A comprehensive 10+ review of systems was negative except for: see hpi                          PHYSICAL EXAMINATION:  BP Readings from Last 3 Encounters:   05/02/24 110/58   04/25/24 133/70   03/01/24 156/78      Wt Readings from Last 3 Encounters:   05/02/24 110 kg (242 lb)   04/25/24 110 kg (242 lb)   03/01/24 110 kg (243 lb)      BMI: Estimated body mass index is 39.06 kg/m² as calculated from the following:    Height as of 5/2/24: 1.676 m (5' 6\").    Weight as of 5/2/24: 110 kg (242 lb).  BSA: Estimated body surface area is 2.26 meters squared as calculated from " "the following:    Height as of 5/2/24: 1.676 m (5' 6\").    Weight as of 5/2/24: 110 kg (242 lb).  HEENT: Normocephalic, atraumatic, PER EOMI, nonicteric, trachea normal, thyroid normal, oropharynx normal.  CARDIAC: regular rate & rhythm, S1 & S2 normal.  No heaves, thrills, gallops or murmurs.  LUNGS: Clear to auscultation, no spinal or CV tenderness.  EXTREMITIES: No evidence of cyanosis, clubbing or edema.       Botox Injection    Patty came today for Botox injection.  I reviewed with her the risks and benefits including ptosis, infection, and bleeding. She has no contraindications. She is on no antibiotics and has no neuromuscular disorders.  Pregnancy is not an issue.     She tolerated the procedure well.  The patient  will return to see me again in three to four months, earlier if there are any problems.     Patty understands the side effects and risks, as well as the necessity for continued treatment to maintain improvement.  Additional therapy may be necessary. Call if there are any problems. See diagram for injection sites and dosages. 200 units were used at a concentration of 10 units per 0.1 mL.         Assessment:  74-year-old with mixed incontinence, urgency dominant     JOHN  -Has failed Vesicare and Myrbetriq and Kegel's, bladder training exercises and fluid management  -UDS demonstrates detrusor overactivity  -No improvement with stage 1, explanted 10/26  s/p botox 12/2/22, 7/5/23 with over 90% improvement, 100 units but was short lasting     Botox 200 units 1/8/24, 5/9/24  Rx macrobid  Follow-up in 4 to 6 weeks      Vulvar discomfort:  Continue lotrisone     Rectocele:   Now having BM sx and pelvic pain will try smooth move tea first   Referred to PT       All questions and concerns were answered and addressed.  The patient expressed understanding and agrees with the plan.     Rufino Vaughan MD    Scribe Attestation  By signing my name below, I, Ansley Gibson   attest that this documentation " has been prepared under the direction and in the presence of Rufino Vaughan MD.

## 2024-05-09 NOTE — PROGRESS NOTES
Physical Therapy    Physical Therapy Treatment    Patient Name: Patty Johnson  MRN: [id number]  Today's Date: 5/10/2024     Onset Date: 1/8/24  Insurance: no auth  Visit Number:2  Start/Stop Time:  15:32-16:28    Assessment: Patient presents with PF muscle weakness and lack of coordination.  Mild tension and atrophy were noted in deeper pelvic floor, more on the left than on the right.  Descent of anterior wall was noted with coughing.      Plan: PF, core and hip strengthening to reduce urinary incontinence.  PF coordination to ease bowel movements and reduce pressure on rectocele.        Current Problem  1. Pelvic pain  Follow Up In Physical Therapy          General: Reason for Referral: R10.2 (ICD-10-CM) - Pelvic pain  Referred By: Dr. Vaughan  Past Medical History Relevant to Rehab: HTN, Asthma, OA, Kidney Disease          Subjective  Patient had Botox in bladder this morning. She reports that she has been trying the exhalation technique with bed mobility. Bowel movements continue to go fairly well with use of fiber and stool softener.  Patient provided verbal consent for internal vaginal PF muscle assessment / manual therapy today.     Precautions: No internal restrictions post-Botox per Dr. Vaughan via secure chat    L NARESH: 8/23, R NARESH 2/15, fell and broke right shoulder 2 years ago and lacks ROM, hands go numb at night and when using phone, STEADi 6         Pain: Denies pain today       Objective   Treatments:  Manual Therapy: Transvaginal assessment of the pelvic floor:  No tenderness or tightness noted in superficial pelvic floor muscles, but tender with palpation of the pubic bones  Contraction present at 11:00 and 1:00; + tenderness and mild tissue restriction at 6:00; Atrophy noted more on the left deeper pelvic floor. Immediate and full relaxation was noted after contraction, but difficulty coordinating quick contractions. Descent of anterior wall with cough.  P: 3-4  E:10  R: 4  F: 4-5    Therapeutic  "Exercise:    -Supine TA with hip adduction ball squeeze 1 x 10, 5\" hold  -Supine clam shell with green t-band 2 x 10, 5\"     OP EDUCATION:  -Patient was educated on the potential benefits of a squatty potty to ease bowel movements.  She was instructed to avoid straining to reduce pressure and prevent worsening of rectocele.     Updated HEP and provided printed handout of home program.    Access Code: [email or web address]   URL: [email or web address]  Date: 05/09/2024  Prepared by: Asya Bernabe    Exercises  - Supine Hip Adduction Isometric with Ball  - 1 x daily - 7 x weekly - 2 sets - 10 reps - 5 seconds hold  - Hooklying Clamshell with Resistance  - 1 x daily - 7 x weekly - 2 sets - 10 reps - 5 seconds hold       Goals:  Active       Pelvic Floor       Patient will improve score on PFIQ-7 by 12%  to show a clinically important change in urinary control and function.        Start:  05/03/24    Expected End:  06/28/24            Patient will be independent with pressure management strategies to reduce urinary incontinence and to avoid rectocele/prolapse symptoms.        Start:  05/03/24    Expected End:  06/28/24               Pelvic Floor        Patient will report ability to perform bed mobility and sit to stand transfers without urinary leakage 90% of the time.        Start:  05/03/24    Expected End:  06/28/24            Patient will normalize day-time voids to every 2-4 hours.        Start:  05/03/24    Expected End:  06/28/24            Patient will report reduction of nocturia to 2x per night or better.       Start:  05/03/24    Expected End:  06/28/24            Patient will demonstrate appropriate fluid management/water intake  for bladder and bowel health.        Start:  05/03/24    Expected End:  06/28/24            Patient will report increased ease of bladder and bowel emptying without strain.        Start:  05/03/24    Expected End:  06/28/24            Patient will increase hip and trunk " strength by 1/3 MMT grade to facilitate muscle performance necessary for ease of functional transfers and urinary control.          Start:  05/03/24    Expected End:  06/28/24            Patient will be independent and adherent to HEP to enhance functional progress and long term management of condition.        Start:  05/03/24    Expected End:  06/28/24

## 2024-05-14 ENCOUNTER — TREATMENT (OUTPATIENT)
Dept: PHYSICAL THERAPY | Facility: CLINIC | Age: 74
End: 2024-05-14
Payer: MEDICARE

## 2024-05-14 DIAGNOSIS — R10.2 PELVIC PAIN: ICD-10-CM

## 2024-05-14 PROCEDURE — 97110 THERAPEUTIC EXERCISES: CPT | Mod: GP | Performed by: PHYSICAL THERAPIST

## 2024-05-14 PROCEDURE — 97530 THERAPEUTIC ACTIVITIES: CPT | Mod: GP | Performed by: PHYSICAL THERAPIST

## 2024-05-14 NOTE — PROGRESS NOTES
"Physical Therapy    Physical Therapy Treatment    Patient Name: Patty Johnson  MRN: [id number]    Today's Date: 5/15/2024        PT Therapeutic Procedures Time Entry  Therapeutic Exercise Time Entry: 40  Therapeutic Activity Time Entry: 15                 Onset Date:1/8/24  Insurance: No Auth  Visit Number:3  Start/Stop Time:  5909-5696    Assessment:  The patient performed all exercises well, without complaint of pain. Per report, coordinating the pelvic floor with other strengthening exercises was challenging.  Patient occasionally requires redirection to stay on task.  She will continue to benefit from PT to reduce urinary leakage.       Plan: PF, core and hip strengthening to reduce urinary incontinence. PF coordination to ease bowel movements and reduce pressure on rectocele.        Current Problem  1. Pelvic pain  Follow Up In Physical Therapy          General:  Reason for Referral: R10.2 (ICD-10-CM) - Pelvic pain  Referred By: Dr. Vaughan  Past Medical History Relevant to Rehab: HTN, Asthma, OA, Kidney Disease          Subjective  Patient states that she hasn't leaked today, but when she got up after sleeping she had urgency.  Feels the Botox has not yet helped.  Unsure how long it took to take effect last time.  Is taking antibiotics as prescribed.  She states that she was also trying kegels, although this was not prescribed. She has not tried to elevate legs before bed time. She thinks that she is straining less with breathing techniques, but also notes that she required multiple BMS to eliminate fully.     Precautions: No internal restrictions post-Botox per Dr. Vaughan via secure chat        Pain: no pain.      Objective     Treatments:  Therapeutic Exercise:    -Supine TA with hip adduction ball squeeze 1 x 10, 5\" hold  -Supine clam shell with green t-band 2 x 10, 5\"   -B bridge with kegel x 10   -S/L clam shell 2 x 10 R/L   -Seated kegel, exhale and hip adduction ball squeeze x 10  Therapeutic Activity: " Patient education:  -Re-education on benefits of squatty potty to assist with ease and more complete BM.  Posture on the toilet during urination. Do not do reps of kegels, only.       Goals:  Active       Pelvic Floor       Patient will improve score on PFIQ-7 by 12%  to show a clinically important change in urinary control and function.        Start:  05/03/24    Expected End:  06/28/24            Patient will be independent with pressure management strategies to reduce urinary incontinence and to avoid rectocele/prolapse symptoms.        Start:  05/03/24    Expected End:  06/28/24               Pelvic Floor        Patient will report ability to perform bed mobility and sit to stand transfers without urinary leakage 90% of the time.        Start:  05/03/24    Expected End:  06/28/24            Patient will normalize day-time voids to every 2-4 hours.        Start:  05/03/24    Expected End:  06/28/24            Patient will report reduction of nocturia to 2x per night or better.       Start:  05/03/24    Expected End:  06/28/24            Patient will demonstrate appropriate fluid management/water intake  for bladder and bowel health.        Start:  05/03/24    Expected End:  06/28/24            Patient will report increased ease of bladder and bowel emptying without strain.        Start:  05/03/24    Expected End:  06/28/24            Patient will increase hip and trunk strength by 1/3 MMT grade to facilitate muscle performance necessary for ease of functional transfers and urinary control.          Start:  05/03/24    Expected End:  06/28/24            Patient will be independent and adherent to HEP to enhance functional progress and long term management of condition.        Start:  05/03/24    Expected End:  06/28/24

## 2024-05-20 ENCOUNTER — TREATMENT (OUTPATIENT)
Dept: PHYSICAL THERAPY | Facility: CLINIC | Age: 74
End: 2024-05-20
Payer: MEDICARE

## 2024-05-20 DIAGNOSIS — M62.81 MUSCLE WEAKNESS: Primary | ICD-10-CM

## 2024-05-20 DIAGNOSIS — R10.2 PELVIC PAIN: ICD-10-CM

## 2024-05-20 DIAGNOSIS — R32 URINARY INCONTINENCE, UNSPECIFIED TYPE: ICD-10-CM

## 2024-05-20 PROCEDURE — 97110 THERAPEUTIC EXERCISES: CPT | Mod: GP

## 2024-05-20 ASSESSMENT — PAIN - FUNCTIONAL ASSESSMENT: PAIN_FUNCTIONAL_ASSESSMENT: 0-10

## 2024-05-20 ASSESSMENT — PAIN SCALES - GENERAL: PAINLEVEL_OUTOF10: 0 - NO PAIN

## 2024-05-20 NOTE — PROGRESS NOTES
"Physical Therapy    Physical Therapy Treatment    Patient Name: Patty Johnson  MRN: [id number]    Today's Date: 5/20/2024  Time Calculation  Start Time: 0957  Stop Time: 1046  Time Calculation (min): 49 min     PT Therapeutic Procedures Time Entry  Therapeutic Exercise Time Entry: 49                 Assessment:   Rest breaks required throughout session. Education provided on \"exhale on effort\". Able to tolerate exercises. Assess pt response and possible HEP update next visit. Pt continues to report leakage getting up from supine.  Plan: Continue to progress treatment per pt tolerance and POC     Pt has been trying to complete HEP.   Current Problem  1. Muscle weakness        2. Pelvic pain  Follow Up In Physical Therapy      3. Urinary incontinence, unspecified type            General  PT  Visit  PT Received On: 05/20/24  Response to Previous Treatment: Patient with no complaints from previous session.  General  Reason for Referral: R10.2 (ICD-10-CM) - Pelvic pain  Referred By: Dr. Vaughan  Past Medical History Relevant to Rehab: HTN, Asthma, OA, Kidney Disease  Pt states she has had leakage every day, always when she gets up from lying down. The Botox hasn't been working as well as last time. Bowel movements are going fine. Getting up once at night to urinate. Spreading voids every 3-4 hours. Began exhaling with voiding, improving emptying of bladder.   Subjective    Precautions  Precautions  Precautions Comment: No internal restrictions post-Botox per Dr. Vaughan via secure chat     L NARESH: 8/23, R NARESH 2/15, fell and broke right shoulder 2 years ago and lacks ROM, hands go numb at night and when using phone, [employer] 6       Pain  Pain Assessment  Pain Assessment: 0-10  Pain Score: 0 - No pain    Objective   N/T    Treatments:  Therapeutic Exercise:  49 mins  -Sci fit seat 11, lvl 1, x5 mins  -Supine TA with hip adduction ball squeeze 2 x 10, 5\" hold  -Supine clam shell with green t-band 2 x 10, 5\"   -Supine clamshell " alternating LE 2x10 green TB  -B bridge with kegel 2x 10   -S/L clam shell 2 x 10 R/L   -Seated kegel, exhale and hip adduction ball squeeze x 10      OP EDUCATION:   Correct completion of exercises     Goals:  Active       Pelvic Floor       Patient will improve score on PFIQ-7 by 12%  to show a clinically important change in urinary control and function.        Start:  05/03/24    Expected End:  06/28/24            Patient will be independent with pressure management strategies to reduce urinary incontinence and to avoid rectocele/prolapse symptoms.        Start:  05/03/24    Expected End:  06/28/24               Pelvic Floor        Patient will report ability to perform bed mobility and sit to stand transfers without urinary leakage 90% of the time.        Start:  05/03/24    Expected End:  06/28/24            Patient will normalize day-time voids to every 2-4 hours.        Start:  05/03/24    Expected End:  06/28/24            Patient will report reduction of nocturia to 2x per night or better.       Start:  05/03/24    Expected End:  06/28/24            Patient will demonstrate appropriate fluid management/water intake  for bladder and bowel health.        Start:  05/03/24    Expected End:  06/28/24            Patient will report increased ease of bladder and bowel emptying without strain.        Start:  05/03/24    Expected End:  06/28/24            Patient will increase hip and trunk strength by 1/3 MMT grade to facilitate muscle performance necessary for ease of functional transfers and urinary control.          Start:  05/03/24    Expected End:  06/28/24            Patient will be independent and adherent to HEP to enhance functional progress and long term management of condition.        Start:  05/03/24    Expected End:  06/28/24

## 2024-05-23 ENCOUNTER — APPOINTMENT (OUTPATIENT)
Dept: UROLOGY | Facility: CLINIC | Age: 74
End: 2024-05-23
Payer: MEDICARE

## 2024-06-06 ENCOUNTER — TELEMEDICINE (OUTPATIENT)
Dept: UROLOGY | Facility: CLINIC | Age: 74
End: 2024-06-06
Payer: MEDICARE

## 2024-06-06 DIAGNOSIS — N32.81 OAB (OVERACTIVE BLADDER): Primary | ICD-10-CM

## 2024-06-06 DIAGNOSIS — N39.41 URGENCY INCONTINENCE: ICD-10-CM

## 2024-06-06 DIAGNOSIS — N81.9 FEMALE GENITAL PROLAPSE, UNSPECIFIED TYPE: ICD-10-CM

## 2024-06-06 DIAGNOSIS — R39.15 URGENCY OF URINATION: ICD-10-CM

## 2024-06-06 PROCEDURE — 99214 OFFICE O/P EST MOD 30 MIN: CPT | Performed by: STUDENT IN AN ORGANIZED HEALTH CARE EDUCATION/TRAINING PROGRAM

## 2024-06-06 PROCEDURE — 1123F ACP DISCUSS/DSCN MKR DOCD: CPT | Performed by: STUDENT IN AN ORGANIZED HEALTH CARE EDUCATION/TRAINING PROGRAM

## 2024-06-06 NOTE — PROGRESS NOTES
Virtual or Telephone Consent    An interactive audio and video telecommunication system which permits real time communications between the patient (at the originating site) and provider (at the distant site) was utilized to provide this telehealth service.   Verbal consent was requested and obtained from Patty Johnson on this date, 06/07/24 for a telehealth visit.       HISTORY OF PRESENT ILLNESS:  Patty Johnson is a 74 y.o. female who presents today for a virtual follow up visit. She reports that she has not had much improvement since her last visit. She is able to empty her bladder.          Past Medical History  She has a past medical history of Personal history of other diseases of the musculoskeletal system and connective tissue and Personal history of other diseases of the respiratory system.    Surgical History  She has a past surgical history that includes Total hip arthroplasty (09/08/2018); Other surgical history (03/04/2020); Other surgical history (03/04/2020); Other surgical history (03/04/2020); Other surgical history (09/18/2018); CT angio abdomen pelvis w and or wo IV IV contrast (5/13/2014); CT angio abdomen pelvis w and or wo IV IV contrast (5/17/2018); CT angio abdomen pelvis w and or wo IV IV contrast (12/5/2019); CT angio abdomen pelvis w and or wo IV IV contrast (12/9/2016); and CT angio abdomen pelvis w and or wo IV IV contrast (8/14/2023).     Social History  She reports that she has never smoked. She has never used smokeless tobacco. No history on file for alcohol use and drug use.    Family History  Family History   Problem Relation Name Age of Onset    Stroke Mother          cerebrovascular accident (CVA)    Heart block Father      Esophageal cancer Brother      Cancer Other      Hypertension Other          Allergies  Benazepril, Hydroxychloroquine, Olmesartan, and Sulfamethoxazole-trimethoprim      A comprehensive 10+ review of systems was negative except for: see hpi                       Assessment:  74-year-old with mixed incontinence, urgency dominant     JOHN  -Has failed Vesicare and Myrbetriq and Kegel's, bladder training exercises and fluid management  -UDS demonstrates detrusor overactivity  -No improvement with stage 1, explanted 10/26  s/p botox 12/2/22, 7/5/23 with over 90% improvement, 100 units but was short lasting     Botox 200 units 1/8/24, 5/9/24,  having an issue in the morning, during daytime symptoms are well-controlled    Will check urine culture  And if symptoms do not improve and culture is negative we can consider other options       Vulvar discomfort:  Continue lotrisone     Rectocele:   Now having BM sx and pelvic pain will try smooth move tea first   Referred to PT      Follow up in 4 to 6 weeks       All questions and concerns were answered and addressed.  The patient expressed understanding and agrees with the plan.     Rufino Vaughan MD    Scribe Attestation  By signing my name below, I, Yaraanel Darden, Scribe   attest that this documentation has been prepared under the direction and in the presence of Rufino Vaughan MD.

## 2024-06-07 ENCOUNTER — LAB (OUTPATIENT)
Dept: LAB | Facility: LAB | Age: 74
End: 2024-06-07
Payer: MEDICARE

## 2024-06-07 ENCOUNTER — APPOINTMENT (OUTPATIENT)
Dept: PHYSICAL THERAPY | Facility: CLINIC | Age: 74
End: 2024-06-07
Payer: MEDICARE

## 2024-06-07 DIAGNOSIS — R39.15 URGENCY OF URINATION: ICD-10-CM

## 2024-06-07 DIAGNOSIS — N32.81 OAB (OVERACTIVE BLADDER): ICD-10-CM

## 2024-06-07 PROCEDURE — 87186 SC STD MICRODIL/AGAR DIL: CPT

## 2024-06-07 PROCEDURE — 87086 URINE CULTURE/COLONY COUNT: CPT

## 2024-06-10 DIAGNOSIS — N32.81 OAB (OVERACTIVE BLADDER): Primary | ICD-10-CM

## 2024-06-10 LAB — BACTERIA UR CULT: ABNORMAL

## 2024-06-10 RX ORDER — NITROFURANTOIN 25; 75 MG/1; MG/1
100 CAPSULE ORAL 2 TIMES DAILY
Qty: 10 CAPSULE | Refills: 0 | Status: SHIPPED | OUTPATIENT
Start: 2024-06-10 | End: 2024-06-15

## 2024-06-13 ENCOUNTER — APPOINTMENT (OUTPATIENT)
Dept: PHYSICAL THERAPY | Facility: CLINIC | Age: 74
End: 2024-06-13
Payer: MEDICARE

## 2024-06-13 DIAGNOSIS — J44.9 CHRONIC OBSTRUCTIVE PULMONARY DISEASE, UNSPECIFIED COPD TYPE (MULTI): ICD-10-CM

## 2024-06-18 ENCOUNTER — APPOINTMENT (OUTPATIENT)
Dept: PULMONOLOGY | Facility: CLINIC | Age: 74
End: 2024-06-18
Payer: MEDICARE

## 2024-06-18 ENCOUNTER — APPOINTMENT (OUTPATIENT)
Dept: PHYSICAL THERAPY | Facility: CLINIC | Age: 74
End: 2024-06-18
Payer: MEDICARE

## 2024-07-03 ENCOUNTER — TELEPHONE (OUTPATIENT)
Dept: PRIMARY CARE | Facility: CLINIC | Age: 74
End: 2024-07-03
Payer: MEDICARE

## 2024-07-08 ENCOUNTER — LAB (OUTPATIENT)
Dept: LAB | Facility: LAB | Age: 74
End: 2024-07-08
Payer: MEDICARE

## 2024-07-08 DIAGNOSIS — N32.81 OVERACTIVE BLADDER: ICD-10-CM

## 2024-07-08 LAB
APPEARANCE UR: CLEAR
BILIRUB UR STRIP.AUTO-MCNC: NEGATIVE MG/DL
COLOR UR: ABNORMAL
GLUCOSE UR STRIP.AUTO-MCNC: ABNORMAL MG/DL
KETONES UR STRIP.AUTO-MCNC: NEGATIVE MG/DL
LEUKOCYTE ESTERASE UR QL STRIP.AUTO: ABNORMAL
MUCOUS THREADS #/AREA URNS AUTO: NORMAL /LPF
NITRITE UR QL STRIP.AUTO: NEGATIVE
PH UR STRIP.AUTO: 5.5 [PH]
PROT UR STRIP.AUTO-MCNC: NEGATIVE MG/DL
RBC # UR STRIP.AUTO: NEGATIVE /UL
RBC #/AREA URNS AUTO: NORMAL /HPF
SP GR UR STRIP.AUTO: 1.02
SQUAMOUS #/AREA URNS AUTO: NORMAL /HPF
UROBILINOGEN UR STRIP.AUTO-MCNC: NORMAL MG/DL
WBC #/AREA URNS AUTO: NORMAL /HPF

## 2024-07-08 PROCEDURE — 81001 URINALYSIS AUTO W/SCOPE: CPT

## 2024-07-18 ENCOUNTER — APPOINTMENT (OUTPATIENT)
Dept: UROLOGY | Facility: CLINIC | Age: 74
End: 2024-07-18
Payer: MEDICARE

## 2024-07-18 ENCOUNTER — TELEPHONE (OUTPATIENT)
Dept: UROLOGY | Facility: CLINIC | Age: 74
End: 2024-07-18

## 2024-07-18 ENCOUNTER — APPOINTMENT (OUTPATIENT)
Dept: RESPIRATORY THERAPY | Facility: HOSPITAL | Age: 74
End: 2024-07-18
Payer: MEDICARE

## 2024-07-18 DIAGNOSIS — N32.81 OAB (OVERACTIVE BLADDER): ICD-10-CM

## 2024-07-18 DIAGNOSIS — N39.0 RECURRENT URINARY TRACT INFECTION: Primary | ICD-10-CM

## 2024-07-18 DIAGNOSIS — N32.81 OAB (OVERACTIVE BLADDER): Primary | ICD-10-CM

## 2024-07-18 PROCEDURE — 1123F ACP DISCUSS/DSCN MKR DOCD: CPT | Performed by: STUDENT IN AN ORGANIZED HEALTH CARE EDUCATION/TRAINING PROGRAM

## 2024-07-18 PROCEDURE — 99214 OFFICE O/P EST MOD 30 MIN: CPT | Performed by: STUDENT IN AN ORGANIZED HEALTH CARE EDUCATION/TRAINING PROGRAM

## 2024-07-18 PROCEDURE — G2211 COMPLEX E/M VISIT ADD ON: HCPCS | Performed by: STUDENT IN AN ORGANIZED HEALTH CARE EDUCATION/TRAINING PROGRAM

## 2024-07-18 RX ORDER — FESOTERODINE FUMARATE 8 MG/1
8 TABLET, FILM COATED, EXTENDED RELEASE ORAL DAILY
Qty: 30 TABLET | Refills: 11 | Status: SHIPPED | OUTPATIENT
Start: 2024-07-18 | End: 2025-07-18

## 2024-07-18 NOTE — PROGRESS NOTES
HISTORY OF PRESENT ILLNESS:  Patty Johnson is a 74 y.o. female who presents today for a follow up visit. She reports that she recently did a urine test because she was having some symptoms. She thinks her botox is starting to wear off because she is waking up at night. She also has urgency after she stands up from sitting down, but thinks she may need to urinate more often. She is not taking any medication for her bladder at this time.          Past Medical History  She has a past medical history of Personal history of other diseases of the musculoskeletal system and connective tissue and Personal history of other diseases of the respiratory system.    Surgical History  She has a past surgical history that includes Total hip arthroplasty (09/08/2018); Other surgical history (03/04/2020); Other surgical history (03/04/2020); Other surgical history (03/04/2020); Other surgical history (09/18/2018); CT angio abdomen pelvis w and or wo IV IV contrast (5/13/2014); CT angio abdomen pelvis w and or wo IV IV contrast (5/17/2018); CT angio abdomen pelvis w and or wo IV IV contrast (12/5/2019); CT angio abdomen pelvis w and or wo IV IV contrast (12/9/2016); and CT angio abdomen pelvis w and or wo IV IV contrast (8/14/2023).     Social History  She reports that she has never smoked. She has never used smokeless tobacco. No history on file for alcohol use and drug use.    Family History  Family History   Problem Relation Name Age of Onset    Stroke Mother          cerebrovascular accident (CVA)    Heart block Father      Esophageal cancer Brother      Cancer Other      Hypertension Other          Allergies  Benazepril, Hydroxychloroquine, Olmesartan, and Sulfamethoxazole-trimethoprim      A comprehensive 10+ review of systems was negative except for: see hpi                          PHYSICAL EXAMINATION:  BP Readings from Last 3 Encounters:   05/02/24 110/58   04/25/24 133/70   03/01/24 156/78      Wt Readings from Last 3  "Encounters:   05/02/24 110 kg (242 lb)   04/25/24 110 kg (242 lb)   03/01/24 110 kg (243 lb)      BMI: Estimated body mass index is 39.06 kg/m² as calculated from the following:    Height as of 5/2/24: 1.676 m (5' 6\").    Weight as of 5/2/24: 110 kg (242 lb).  BSA: Estimated body surface area is 2.26 meters squared as calculated from the following:    Height as of 5/2/24: 1.676 m (5' 6\").    Weight as of 5/2/24: 110 kg (242 lb).  HEENT: Normocephalic, atraumatic, PER EOMI, nonicteric, trachea normal, thyroid normal, oropharynx normal.  CARDIAC: regular rate & rhythm, S1 & S2 normal.  No heaves, thrills, gallops or murmurs.  LUNGS: Clear to auscultation, no spinal or CV tenderness.  EXTREMITIES: No evidence of cyanosis, clubbing or edema.               Assessment:  74-year-old with mixed incontinence, urgency dominant     JOHN  -Has failed Vesicare and Myrbetriq and Kegel's, bladder training exercises and fluid management  -UDS demonstrates detrusor overactivity  -failed SNM in 2022  s/p botox 12/2/22, 7/5/23 with over 90% improvement, 100 units but was short lasting     Botox 200 units 1/8/24, 5/9/24,  having an issue in the morning, during daytime symptoms are well-controlled     Will check urine culture        Will try botox again with samples     Rx fesoterodine     And if symptoms do not improve and culture is negative we can consider other options like vivally, ptns or bluewind     Vulvar discomfort:  Continue lotrisone     Rectocele:   Now having BM sx and pelvic pain will try smooth move tea first   Referred to PT      Follow up for botox      All questions and concerns were answered and addressed.  The patient expressed understanding and agrees with the plan.     Rufino Vaughan MD    Scribe Attestation  By signing my name below, I Yara Adelso, Scrbev   attest that this documentation has been prepared under the direction and in the presence of Rufino Vaughan MD.  "

## 2024-07-31 DIAGNOSIS — E79.0 HYPERURICEMIA: ICD-10-CM

## 2024-08-01 RX ORDER — ALLOPURINOL 100 MG/1
100 TABLET ORAL DAILY
Qty: 100 TABLET | Refills: 2 | Status: SHIPPED | OUTPATIENT
Start: 2024-08-01

## 2024-08-13 ENCOUNTER — TELEPHONE (OUTPATIENT)
Dept: PHARMACY | Facility: HOSPITAL | Age: 74
End: 2024-08-13
Payer: MEDICARE

## 2024-08-13 NOTE — TELEPHONE ENCOUNTER
I reviewed the telephone encounter and agree with the student’s findings and plans as written. Case discussed with student.    Danielle Milton, PharmD

## 2024-08-13 NOTE — TELEPHONE ENCOUNTER
Population Health: Outreach by Ambulatory Pharmacy Team    Payor: [employer] [place]  Reason: Adherence  Medication: Jardiance 10mg  Outcome: Left Voicemail    GIOVANNY STEVEN

## 2024-08-15 NOTE — TELEPHONE ENCOUNTER
Received returned call from Patty Johnson, endorses issues with Jardiance affordability and  endorsing issues with Eliquis affordability. Stopped Jardiance due to affordability & UTI frequency. Is having issues also affording Restasis. Discussed  Ambulatory pharmacy team and how we can help certain patients with medication affordability. Explained to patient that will reach out to PCP on behalf of her and her  to get pharmacy referral to help afford medications and will be in touch on any updates.    Danielle Milton, PharmD  Clinical Pharmacist - Population Health   Meds [facility]

## 2024-08-28 DIAGNOSIS — I10 PRIMARY HYPERTENSION: ICD-10-CM

## 2024-08-28 DIAGNOSIS — K21.9 GASTROESOPHAGEAL REFLUX DISEASE, UNSPECIFIED WHETHER ESOPHAGITIS PRESENT: ICD-10-CM

## 2024-08-28 DIAGNOSIS — E78.5 HYPERLIPIDEMIA, UNSPECIFIED HYPERLIPIDEMIA TYPE: ICD-10-CM

## 2024-08-29 RX ORDER — OMEPRAZOLE 40 MG/1
CAPSULE, DELAYED RELEASE ORAL
Qty: 200 CAPSULE | Refills: 2 | Status: SHIPPED | OUTPATIENT
Start: 2024-08-29

## 2024-08-29 RX ORDER — SIMVASTATIN 20 MG/1
TABLET, FILM COATED ORAL
Qty: 100 TABLET | Refills: 2 | Status: SHIPPED | OUTPATIENT
Start: 2024-08-29

## 2024-08-29 RX ORDER — ATENOLOL AND CHLORTHALIDONE TABLET 100; 25 MG/1; MG/1
1 TABLET ORAL DAILY
Qty: 100 TABLET | Refills: 2 | Status: SHIPPED | OUTPATIENT
Start: 2024-08-29

## 2024-09-04 ENCOUNTER — LAB (OUTPATIENT)
Dept: LAB | Facility: LAB | Age: 74
End: 2024-09-04
Payer: MEDICARE

## 2024-09-04 DIAGNOSIS — N39.0 RECURRENT URINARY TRACT INFECTION: ICD-10-CM

## 2024-09-04 PROCEDURE — 87086 URINE CULTURE/COLONY COUNT: CPT

## 2024-09-06 LAB — BACTERIA UR CULT: NORMAL

## 2024-09-09 ENCOUNTER — APPOINTMENT (OUTPATIENT)
Dept: UROLOGY | Facility: CLINIC | Age: 74
End: 2024-09-09
Payer: MEDICARE

## 2024-09-09 DIAGNOSIS — N32.81 OAB (OVERACTIVE BLADDER): Primary | ICD-10-CM

## 2024-09-09 LAB
POC APPEARANCE, URINE: CLEAR
POC BILIRUBIN, URINE: NEGATIVE
POC BLOOD, URINE: NEGATIVE
POC COLOR, URINE: YELLOW
POC GLUCOSE, URINE: NEGATIVE MG/DL
POC KETONES, URINE: NEGATIVE MG/DL
POC LEUKOCYTES, URINE: ABNORMAL
POC NITRITE,URINE: NEGATIVE
POC PH, URINE: 6 PH
POC PROTEIN, URINE: NEGATIVE MG/DL
POC SPECIFIC GRAVITY, URINE: >=1.03
POC UROBILINOGEN, URINE: 0.2 EU/DL

## 2024-09-09 PROCEDURE — 52287 CYSTOSCOPY CHEMODENERVATION: CPT | Performed by: STUDENT IN AN ORGANIZED HEALTH CARE EDUCATION/TRAINING PROGRAM

## 2024-09-09 PROCEDURE — 81003 URINALYSIS AUTO W/O SCOPE: CPT | Performed by: STUDENT IN AN ORGANIZED HEALTH CARE EDUCATION/TRAINING PROGRAM

## 2024-09-09 RX ORDER — NITROFURANTOIN 25; 75 MG/1; MG/1
CAPSULE ORAL
Qty: 6 CAPSULE | Refills: 0 | Status: SHIPPED | OUTPATIENT
Start: 2024-09-09

## 2024-09-09 NOTE — Clinical Note
September 9, 2024       No Recipients    Patient: Patty Johnson   YOB: 1950   Date of Visit: 9/9/2024       Dear Dr. Colin Recipients:    Thank you for referring Patty Johnson to me for evaluation. Below are my notes for this consultation.  If you have questions, please do not hesitate to call me. I look forward to following your patient along with you.       Sincerely,     Rufino Vaughan MD      CC:   No Recipients  ______________________________________________________________________________________    HISTORY OF PRESENT ILLNESS:  Patty Johnson is a 74 y.o. female who presents today for a botox injection.          Past Medical History  She has a past medical history of Personal history of other diseases of the musculoskeletal system and connective tissue and Personal history of other diseases of the respiratory system.    Surgical History  She has a past surgical history that includes Total hip arthroplasty (09/08/2018); Other surgical history (03/04/2020); Other surgical history (03/04/2020); Other surgical history (03/04/2020); Other surgical history (09/18/2018); CT angio abdomen pelvis w and or wo IV IV contrast (5/13/2014); CT angio abdomen pelvis w and or wo IV IV contrast (5/17/2018); CT angio abdomen pelvis w and or wo IV IV contrast (12/5/2019); CT angio abdomen pelvis w and or wo IV IV contrast (12/9/2016); and CT angio abdomen pelvis w and or wo IV IV contrast (8/14/2023).     Social History  She reports that she has never smoked. She has never used smokeless tobacco. No history on file for alcohol use and drug use.    Family History  Family History   Problem Relation Name Age of Onset    Stroke Mother          cerebrovascular accident (CVA)    Heart block Father      Esophageal cancer Brother      Cancer Other      Hypertension Other          Allergies  Benazepril, Hydroxychloroquine, Olmesartan, and Sulfamethoxazole-trimethoprim      A comprehensive 10+ review of systems was  "negative except for: see hpi                          PHYSICAL EXAMINATION:  BP Readings from Last 3 Encounters:   05/02/24 110/58   04/25/24 133/70   03/01/24 156/78      Wt Readings from Last 3 Encounters:   05/02/24 110 kg (242 lb)   04/25/24 110 kg (242 lb)   03/01/24 110 kg (243 lb)      BMI:   Estimated body mass index is 39.06 kg/m² as calculated from the following:    Height as of 5/2/24: 1.676 m (5' 6\").    Weight as of 5/2/24: 110 kg (242 lb).  BSA:   Estimated body surface area is 2.26 meters squared as calculated from the following:    Height as of 5/2/24: 1.676 m (5' 6\").    Weight as of 5/2/24: 110 kg (242 lb).  HEENT: Normocephalic, atraumatic, PER EOMI, nonicteric, trachea normal, thyroid normal, oropharynx normal.  CARDIAC: regular rate & rhythm, S1 & S2 normal.  No heaves, thrills, gallops or murmurs.  LUNGS: Clear to auscultation, no spinal or CV tenderness.  EXTREMITIES: No evidence of cyanosis, clubbing or edema.       Botox Injection    Patty came today for Botox injection.  I reviewed with her the risks and benefits including ptosis, infection, and bleeding. She has no contraindications. She is on no antibiotics and has no neuromuscular disorders.  Pregnancy is not an issue.     She tolerated the procedure well.  The patient  will return to see me again in three to four months, earlier if there are any problems.     Patty understands the side effects and risks, as well as the necessity for continued treatment to maintain improvement.  Additional therapy may be necessary. Call if there are any problems. See diagram for injection sites and dosages. 200 units were used at a concentration of 10 units per 0.1 mL.         Assessment:  74-year-old with mixed incontinence, urgency dominant     JOHN  -Has failed Vesicare and Myrbetriq and Kegel's, bladder training exercises and fluid management  -UDS demonstrates detrusor overactivity  -failed SNM in 2022  s/p botox 12/2/22, 7/5/23 with over 90% " improvement, 100 units but was short lasting     Botox 200 units 1/8/24, 5/9/24,  9/9/24 (samples)   Rx abx       Rx fesoterodine     And if symptoms do not improve and culture is negative we can consider other options like vivally, ptns or bluewind        Vulvar discomfort:  Continue lotrisone       Rectocele:   Now having BM sx and pelvic pain will try smooth move tea first   Referred to PT        Follow up in 4 to 6 weeks       All questions and concerns were answered and addressed.  The patient expressed understanding and agrees with the plan.     Rufino Vaughan MD    Scribe Attestation  By signing my name below, I, Yara Darden, Scribemili   attest that this documentation has been prepared under the direction and in the presence of Rufino Vaughan MD.

## 2024-09-09 NOTE — PROGRESS NOTES
"HISTORY OF PRESENT ILLNESS:  Patty Johnson is a 74 y.o. female who presents today for a botox injection.          Past Medical History  She has a past medical history of Personal history of other diseases of the musculoskeletal system and connective tissue and Personal history of other diseases of the respiratory system.    Surgical History  She has a past surgical history that includes Total hip arthroplasty (09/08/2018); Other surgical history (03/04/2020); Other surgical history (03/04/2020); Other surgical history (03/04/2020); Other surgical history (09/18/2018); CT angio abdomen pelvis w and or wo IV IV contrast (5/13/2014); CT angio abdomen pelvis w and or wo IV IV contrast (5/17/2018); CT angio abdomen pelvis w and or wo IV IV contrast (12/5/2019); CT angio abdomen pelvis w and or wo IV IV contrast (12/9/2016); and CT angio abdomen pelvis w and or wo IV IV contrast (8/14/2023).     Social History  She reports that she has never smoked. She has never used smokeless tobacco. No history on file for alcohol use and drug use.    Family History  Family History   Problem Relation Name Age of Onset    Stroke Mother          cerebrovascular accident (CVA)    Heart block Father      Esophageal cancer Brother      Cancer Other      Hypertension Other          Allergies  Benazepril, Hydroxychloroquine, Olmesartan, and Sulfamethoxazole-trimethoprim      A comprehensive 10+ review of systems was negative except for: see hpi                          PHYSICAL EXAMINATION:  BP Readings from Last 3 Encounters:   05/02/24 110/58   04/25/24 133/70   03/01/24 156/78      Wt Readings from Last 3 Encounters:   05/02/24 110 kg (242 lb)   04/25/24 110 kg (242 lb)   03/01/24 110 kg (243 lb)      BMI:   Estimated body mass index is 39.06 kg/m² as calculated from the following:    Height as of 5/2/24: 1.676 m (5' 6\").    Weight as of 5/2/24: 110 kg (242 lb).  BSA:   Estimated body surface area is 2.26 meters squared as calculated " "from the following:    Height as of 5/2/24: 1.676 m (5' 6\").    Weight as of 5/2/24: 110 kg (242 lb).  HEENT: Normocephalic, atraumatic, PER EOMI, nonicteric, trachea normal, thyroid normal, oropharynx normal.  CARDIAC: regular rate & rhythm, S1 & S2 normal.  No heaves, thrills, gallops or murmurs.  LUNGS: Clear to auscultation, no spinal or CV tenderness.  EXTREMITIES: No evidence of cyanosis, clubbing or edema.       Botox Injection    Patty came today for Botox injection.  I reviewed with her the risks and benefits including ptosis, infection, and bleeding. She has no contraindications. She is on no antibiotics and has no neuromuscular disorders.  Pregnancy is not an issue.     She tolerated the procedure well.  The patient  will return to see me again in three to four months, earlier if there are any problems.     Patty understands the side effects and risks, as well as the necessity for continued treatment to maintain improvement.  Additional therapy may be necessary. Call if there are any problems. See diagram for injection sites and dosages. 200 units were used at a concentration of 10 units per 0.1 mL.         Assessment:  74-year-old with mixed incontinence, urgency dominant     JOHN  -Has failed Vesicare and Myrbetriq and Kegel's, bladder training exercises and fluid management  -UDS demonstrates detrusor overactivity  -failed SNM in 2022  s/p botox 12/2/22, 7/5/23 with over 90% improvement, 100 units but was short lasting     Botox 200 units 1/8/24, 5/9/24,  9/9/24 (samples)   Rx abx       Rx fesoterodine     And if symptoms do not improve and culture is negative we can consider other options like vivally, ptns or bluewind        Vulvar discomfort:  Continue lotrisone       Rectocele:   Now having BM sx and pelvic pain will try smooth move tea first   Referred to PT        Follow up in 4 to 6 weeks       All questions and concerns were answered and addressed.  The patient expressed understanding and " agrees with the plan.     Rufino Vaughan MD    Scribe Attestation  By signing my name below, I, Yara Darden, Scribemili   attest that this documentation has been prepared under the direction and in the presence of Rufino Vaughan MD.

## 2024-10-07 ENCOUNTER — HOSPITAL ENCOUNTER (OUTPATIENT)
Dept: RESPIRATORY THERAPY | Facility: HOSPITAL | Age: 74
Discharge: HOME | End: 2024-10-07
Payer: MEDICARE

## 2024-10-07 DIAGNOSIS — J44.9 CHRONIC OBSTRUCTIVE PULMONARY DISEASE, UNSPECIFIED COPD TYPE (MULTI): ICD-10-CM

## 2024-10-07 LAB
MGC ASCENT PFT - FEV1 - POST: 1.69
MGC ASCENT PFT - FEV1 - PRE: 1.62
MGC ASCENT PFT - FEV1 - PREDICTED: 2.18
MGC ASCENT PFT - FVC - POST: 2.33
MGC ASCENT PFT - FVC - PRE: 2.62
MGC ASCENT PFT - FVC - PREDICTED: 2.86

## 2024-10-07 PROCEDURE — 94060 EVALUATION OF WHEEZING: CPT

## 2024-10-07 PROCEDURE — 94060 EVALUATION OF WHEEZING: CPT | Performed by: INTERNAL MEDICINE

## 2024-10-07 PROCEDURE — 94729 DIFFUSING CAPACITY: CPT | Performed by: INTERNAL MEDICINE

## 2024-10-07 PROCEDURE — 98960 EDU&TRN PT SELF-MGMT NQHP 1: CPT

## 2024-10-07 PROCEDURE — 94664 DEMO&/EVAL PT USE INHALER: CPT | Mod: 59

## 2024-10-07 PROCEDURE — 94729 DIFFUSING CAPACITY: CPT

## 2024-10-07 PROCEDURE — 94760 N-INVAS EAR/PLS OXIMETRY 1: CPT

## 2024-10-07 PROCEDURE — 94726 PLETHYSMOGRAPHY LUNG VOLUMES: CPT

## 2024-10-07 PROCEDURE — 94726 PLETHYSMOGRAPHY LUNG VOLUMES: CPT | Performed by: INTERNAL MEDICINE

## 2024-10-11 ENCOUNTER — OFFICE VISIT (OUTPATIENT)
Dept: PULMONOLOGY | Facility: CLINIC | Age: 74
End: 2024-10-11
Payer: MEDICARE

## 2024-10-11 VITALS
SYSTOLIC BLOOD PRESSURE: 147 MMHG | RESPIRATION RATE: 16 BRPM | DIASTOLIC BLOOD PRESSURE: 77 MMHG | WEIGHT: 240 LBS | HEART RATE: 60 BPM | BODY MASS INDEX: 38.74 KG/M2 | OXYGEN SATURATION: 98 %

## 2024-10-11 DIAGNOSIS — G47.33 OSA (OBSTRUCTIVE SLEEP APNEA): ICD-10-CM

## 2024-10-11 DIAGNOSIS — J45.909 ASTHMA, UNSPECIFIED ASTHMA SEVERITY, UNSPECIFIED WHETHER COMPLICATED, UNSPECIFIED WHETHER PERSISTENT (HHS-HCC): Primary | ICD-10-CM

## 2024-10-11 DIAGNOSIS — J45.20 MILD INTERMITTENT ASTHMA WITHOUT COMPLICATION (HHS-HCC): ICD-10-CM

## 2024-10-11 PROCEDURE — 1123F ACP DISCUSS/DSCN MKR DOCD: CPT | Performed by: INTERNAL MEDICINE

## 2024-10-11 PROCEDURE — 3077F SYST BP >= 140 MM HG: CPT | Performed by: INTERNAL MEDICINE

## 2024-10-11 PROCEDURE — 1160F RVW MEDS BY RX/DR IN RCRD: CPT | Performed by: INTERNAL MEDICINE

## 2024-10-11 PROCEDURE — 1126F AMNT PAIN NOTED NONE PRSNT: CPT | Performed by: INTERNAL MEDICINE

## 2024-10-11 PROCEDURE — 3078F DIAST BP <80 MM HG: CPT | Performed by: INTERNAL MEDICINE

## 2024-10-11 PROCEDURE — 1159F MED LIST DOCD IN RCRD: CPT | Performed by: INTERNAL MEDICINE

## 2024-10-11 PROCEDURE — 1036F TOBACCO NON-USER: CPT | Performed by: INTERNAL MEDICINE

## 2024-10-11 PROCEDURE — 99213 OFFICE O/P EST LOW 20 MIN: CPT | Performed by: INTERNAL MEDICINE

## 2024-10-11 RX ORDER — FLUTICASONE PROPIONATE AND SALMETEROL 100; 50 UG/1; UG/1
1 POWDER RESPIRATORY (INHALATION)
Qty: 60 EACH | Refills: 11 | Status: SHIPPED | OUTPATIENT
Start: 2024-10-11 | End: 2024-10-15 | Stop reason: SDUPTHER

## 2024-10-11 ASSESSMENT — COLUMBIA-SUICIDE SEVERITY RATING SCALE - C-SSRS
6. HAVE YOU EVER DONE ANYTHING, STARTED TO DO ANYTHING, OR PREPARED TO DO ANYTHING TO END YOUR LIFE?: NO
1. IN THE PAST MONTH, HAVE YOU WISHED YOU WERE DEAD OR WISHED YOU COULD GO TO SLEEP AND NOT WAKE UP?: NO
2. HAVE YOU ACTUALLY HAD ANY THOUGHTS OF KILLING YOURSELF?: NO

## 2024-10-11 ASSESSMENT — ENCOUNTER SYMPTOMS
SHORTNESS OF BREATH: 1
PSYCHIATRIC NEGATIVE: 1
FEVER: 0
CARDIOVASCULAR NEGATIVE: 1
GASTROINTESTINAL NEGATIVE: 1
COUGH: 0
CHILLS: 0
DEPRESSION: 0
NEUROLOGICAL NEGATIVE: 1

## 2024-10-11 ASSESSMENT — PAIN SCALES - GENERAL: PAINLEVEL: 0-NO PAIN

## 2024-10-11 NOTE — PROGRESS NOTES
Subjective   Patient ID: Patty Johnson is a 74 y.o. female who presents for Sleep Apnea.  h/o asthma, CELIA on BIPAP here for f/u. Mild obstruction 10/2024. Occasional dyspnea on exertion. . No fevers, chills, cough. Only on singulair. Has not needed rescue. ET is about 100feet.         Review of Systems   Constitutional:  Negative for chills and fever.   Respiratory:  Positive for shortness of breath. Negative for cough.    Cardiovascular: Negative.    Gastrointestinal: Negative.    Neurological: Negative.    Psychiatric/Behavioral: Negative.     All other systems reviewed and are negative.      Objective   Physical Exam  Vitals reviewed.   Constitutional:       Appearance: She is obese.   HENT:      Head: Normocephalic and atraumatic.   Eyes:      Extraocular Movements: Extraocular movements intact.   Cardiovascular:      Rate and Rhythm: Normal rate and regular rhythm.      Heart sounds: Normal heart sounds.   Pulmonary:      Effort: Pulmonary effort is normal.      Breath sounds: Normal breath sounds.   Abdominal:      Palpations: Abdomen is soft.      Tenderness: There is no abdominal tenderness.   Musculoskeletal:      Cervical back: Normal range of motion.   Skin:     General: Skin is warm.   Neurological:      General: No focal deficit present.      Mental Status: She is alert and oriented to person, place, and time. Mental status is at baseline.   Psychiatric:         Mood and Affect: Mood normal.         Behavior: Behavior normal.         Assessment/Plan   Problem List Items Addressed This Visit       Asthma - Primary     Mild obstruction 10/2024.  Cont Proair. IgE+. Cont singulair. Start Advair 100mcg bid. Cont Flonase.          CELIA (obstructive sleep apnea)     on autobipap. Wears for 97% of nights for 7m69wuy w/ AHI 1.3. Check compliance next visit.  Will order new machine.  Patient uses and benefits from her BIPAP.          RTC in 1 year.     Time Spent  Prep time on day of patient encounter: 5  minutes  Time spent directly with patient, family or caregiver: 10 minutes  Additional Time Spent on Patient Care Activities: 0 minutes  Documentation Time: 5 minutes  Other Time Spent: 0 minutes  Total: 20 minutes        Zack Diamond MD 10/11/24 4:17 PM

## 2024-10-11 NOTE — ASSESSMENT & PLAN NOTE
Mild obstruction 10/2024.  Cont Proair. IgE+. Cont singulair. Start Advair 100mcg bid. Cont Flonase.

## 2024-10-11 NOTE — ASSESSMENT & PLAN NOTE
on autobipap. Wears for 97% of nights for 9h15dza w/ AHI 1.3. Check compliance next visit.  Will order new machine.  Patient uses and benefits from her BIPAP.

## 2024-10-15 DIAGNOSIS — J45.20 MILD INTERMITTENT ASTHMA WITHOUT COMPLICATION (HHS-HCC): ICD-10-CM

## 2024-10-16 DIAGNOSIS — G47.33 OSA (OBSTRUCTIVE SLEEP APNEA): ICD-10-CM

## 2024-10-16 NOTE — PROGRESS NOTES
Virtual or Telephone Consent    A telephone visit (audio only) between the patient (at the originating site) and the provider (at the distant site) was utilized to provide this telehealth service.   Verbal consent was requested and obtained from Patty Johnson on this date, 10/20/24 for a telehealth visit.     HISTORY OF PRESENT ILLNESS:  Patty Johnson is a 74 y.o. female who presents today for a virtual follow up visit. She states her botox is not helping her very much and she is upset about that. She is up 3 to 4 times at night to urinate. She has not tried gemtesa.          Past Medical History  She has a past medical history of Personal history of other diseases of the musculoskeletal system and connective tissue and Personal history of other diseases of the respiratory system.    Surgical History  She has a past surgical history that includes Total hip arthroplasty (09/08/2018); Other surgical history (03/04/2020); Other surgical history (03/04/2020); Other surgical history (03/04/2020); Other surgical history (09/18/2018); CT angio abdomen pelvis w and or wo IV IV contrast (5/13/2014); CT angio abdomen pelvis w and or wo IV IV contrast (5/17/2018); CT angio abdomen pelvis w and or wo IV IV contrast (12/5/2019); CT angio abdomen pelvis w and or wo IV IV contrast (12/9/2016); and CT angio abdomen pelvis w and or wo IV IV contrast (8/14/2023).     Social History  She reports that she has never smoked. She has never used smokeless tobacco. No history on file for alcohol use and drug use.    Family History  Family History   Problem Relation Name Age of Onset    Stroke Mother          cerebrovascular accident (CVA)    Heart block Father      Esophageal cancer Brother      Cancer Other      Hypertension Other          Allergies  Benazepril, Hydroxychloroquine, Olmesartan, and Sulfamethoxazole-trimethoprim      A comprehensive 10+ review of systems was negative except for: see hpi                   Assessment:  74-year-old with mixed incontinence, urgency dominant     JOHN  -Has failed Vesicare and Myrbetriq and Kegel's, bladder training exercises and fluid management  -UDS demonstrates detrusor overactivity  -failed SNM in 2022  s/p botox 12/2/22, 7/5/23 with over 90% improvement, 100 units but was short lasting     Botox 200 units 1/8/24, 5/9/24,  9/9/24, no improvement      Tried fesoterodine     And if symptoms do not improve and culture is negative we can consider other options like vivally, ptns or bluewind      Will try Gemtesa, will refer her to  clinical pharmacy for this       Vulvar discomfort:  Continue lotrisone       Rectocele:   Now having BM sx and pelvic pain will try smooth move tea first   Referred to PT        Follow up in 8 weeks        I spent a total of 11 minutes speaking with the patient on the telephone     All questions and concerns were answered and addressed.  The patient expressed understanding and agrees with the plan.     Rufino Vaughan MD    Scribe Attestation  By signing my name below, I, Yara Darden Scribemili   attest that this documentation has been prepared under the direction and in the presence of Rufino Vaughan MD.

## 2024-10-17 ENCOUNTER — CLINICAL SUPPORT (OUTPATIENT)
Dept: PHARMACY | Facility: HOSPITAL | Age: 74
End: 2024-10-17
Payer: MEDICARE

## 2024-10-17 ENCOUNTER — APPOINTMENT (OUTPATIENT)
Dept: UROLOGY | Facility: CLINIC | Age: 74
End: 2024-10-17
Payer: MEDICARE

## 2024-10-17 ENCOUNTER — TELEPHONE (OUTPATIENT)
Dept: PHARMACY | Facility: HOSPITAL | Age: 74
End: 2024-10-17

## 2024-10-17 DIAGNOSIS — N39.0 RECURRENT UTI: Primary | ICD-10-CM

## 2024-10-17 DIAGNOSIS — J45.20 MILD INTERMITTENT ASTHMA WITHOUT COMPLICATION (HHS-HCC): ICD-10-CM

## 2024-10-17 DIAGNOSIS — N32.81 OAB (OVERACTIVE BLADDER): ICD-10-CM

## 2024-10-17 DIAGNOSIS — N39.46 MIXED INCONTINENCE: ICD-10-CM

## 2024-10-17 PROCEDURE — 1123F ACP DISCUSS/DSCN MKR DOCD: CPT | Performed by: STUDENT IN AN ORGANIZED HEALTH CARE EDUCATION/TRAINING PROGRAM

## 2024-10-17 PROCEDURE — 99214 OFFICE O/P EST MOD 30 MIN: CPT | Performed by: STUDENT IN AN ORGANIZED HEALTH CARE EDUCATION/TRAINING PROGRAM

## 2024-10-17 RX ORDER — FLUTICASONE PROPIONATE AND SALMETEROL 100; 50 UG/1; UG/1
1 POWDER RESPIRATORY (INHALATION)
Qty: 60 EACH | Refills: 11 | Status: SHIPPED | OUTPATIENT
Start: 2024-10-17 | End: 2024-10-17

## 2024-10-17 RX ORDER — VIBEGRON 75 MG/1
75 TABLET, FILM COATED ORAL DAILY
Qty: 90 TABLET | Refills: 3 | Status: SHIPPED | OUTPATIENT
Start: 2024-10-17 | End: 2024-10-25

## 2024-10-17 RX ORDER — ALBUTEROL SULFATE 90 UG/1
2 INHALANT RESPIRATORY (INHALATION) EVERY 4 HOURS PRN
Qty: 8 G | Refills: 3 | Status: CANCELLED | OUTPATIENT
Start: 2024-10-17

## 2024-10-17 RX ORDER — ALBUTEROL SULFATE 90 UG/1
2 INHALANT RESPIRATORY (INHALATION) EVERY 6 HOURS PRN
Qty: 18 G | Refills: 3 | Status: SHIPPED | OUTPATIENT
Start: 2024-10-17 | End: 2025-10-17

## 2024-10-17 RX ORDER — FLUTICASONE PROPIONATE AND SALMETEROL 100; 50 UG/1; UG/1
1 POWDER RESPIRATORY (INHALATION)
Qty: 60 EACH | Refills: 3 | Status: SHIPPED | OUTPATIENT
Start: 2024-10-17 | End: 2024-10-17 | Stop reason: WASHOUT

## 2024-10-17 RX ORDER — FLUTICASONE PROPIONATE AND SALMETEROL 100; 50 UG/1; UG/1
1 POWDER RESPIRATORY (INHALATION)
Qty: 60 EACH | Refills: 3 | Status: SHIPPED | OUTPATIENT
Start: 2024-10-17

## 2024-10-17 NOTE — TELEPHONE ENCOUNTER
Patient Assistance Program Approval:     Contacted patient to let her know that the application for assistance has been approved.     This approval is valid through 10/17/2025 as long as the following criteria continue to be satisfied:     Your medications (Advair, Ventolin) remains covered under your current insurance plan.   Your prescriber does not discontinue therapy.   You do not seek reimbursement from any other private or government-funded programs for the medication.    Under this program, the pharmacy will first bill your insurance plan for your indemnified specified medication. The [employer] Fund will then offset your copay balance, so that your out-of pocket expense for your specialty medication will be $0.00.    Clinical Pharmacist Follow-Up: November 19th, 2024 at 2:30 PM    Thank you,   Bernadette Bah, PharmD  Clinical Pharmacy Specialist, Primary Care   [phone]

## 2024-10-17 NOTE — PROGRESS NOTES
Clinical Pharmacy Appointment    Patient ID: Patty Johnson is a 74 y.o. female who presents for Asthma.    Pt is here for First appointment.     Referring Provider: Zack Diamond MD  Last visit with pulmonology: 10/11/2024   Next visit with pulmonology: 10/10/2025    PCP: Husam Sherwood DO   Last visit with PCP: 2024   Next visit with PCP: not yet scheduled     Patient Assistance application for Ventolin and Advair submitted. If approved, medication(s) will be received at no cost to patient from [facility].      Subjective     HPI  ASTHMA  Patient has been diagnosed with: Asthma, diagnosed as an adult  Following with pulmonologist: Yes  Smoking history: never been a smoker    Current Regimen  montelukast 10 mg daily    Historical Treatment  albuterol - patient was not using,   Advair - stopped due to cost and patient did not feel it was helping    Asthma Severity  Symptoms/week: daily with exertion  Primary Symptoms: dyspnea, non-productive cough, and wheezing  Aggravating Factors: exercise, going up stairs  Alleviating Factors: rest  Nighttime awakenings: < or = 2 times/month   RUFUS use: < or = 2 days/week  Interference with normal activity: minor limitation    Pulmonary Function Tests from 10/7/2024  Indicate mild obstruction      Exacerbation History:  When was your last hospitalization for an exacerbation? None  When was the last time you were treated with antibiotics and/or steroids? Frequently treated with antibiotics for UTIs     Immunization History:  Influenza: up to date, received for this season  Pneumonia: up to date  COVID: recommended booster dose  RSV: up to date      Medication System Management  Patient's preferred pharmacy:  [facility] home delivery  Adherence/Organization: no concerns identified  Affordability/Accessibility: cost concerns with Advair  Patient may qualify for  Patient Assistance Program based on financial information provided      Objective    Allergies   Allergen Reactions    Benazepril Cough    Hydroxychloroquine Unknown    Olmesartan Dizziness    Sulfamethoxazole-Trimethoprim Rash     Social History     Social History Narrative    Not on file      Medication Review  Current Outpatient Medications   Medication Instructions    albuterol (Ventolin HFA) 90 mcg/actuation inhaler 2 puffs, inhalation, Every 6 hours PRN    allopurinol (ZYLOPRIM) 100 mg, oral, Daily, as directed    amLODIPine (NORVASC) 10 mg, oral, Daily    aspirin 81 mg EC tablet 1 tablet, oral, Daily    atenoloL-chlorthalidone (Tenoretic) 100-25 mg tablet 1 tablet, oral, Daily    cholecalciferol (Vitamin D-3) 5,000 Units tablet 1 tablet, oral, Daily    fesoterodine 8 mg, oral, Daily    fish oil concentrate (Omega-3) 120-180 mg capsule 2 g, oral, 2 times daily before meals    fluticasone propion-salmeteroL (Advair Diskus) 100-50 mcg/dose diskus inhaler 1 puff, inhalation, 2 times daily RT, Rinse mouth with water after use to reduce aftertaste and incidence of candidiasis. Do not swallow.    fluticasone propion-salmeteroL (Advair Diskus) 100-50 mcg/dose diskus inhaler 1 puff, inhalation, 2 times daily RT, Rinse mouth with water after use to reduce aftertaste and incidence of candidiasis. Do not swallow.    losartan (COZAAR) 100 mg, oral, Daily    milk thistle seed extract 175 mg capsule Milk Thistle 175 MG Oral Capsule   Refills: 0        Start : 18-Sep-2018   Active    miscellaneous medical supply (Blood Pressure Cuff) misc 1 each, miscellaneous, 2 times daily    montelukast (SINGULAIR) 10 mg, oral, Nightly    mv-min-folic acid-lutein (Essential Woman 50 Plus) 0.4-250 mg-mcg tablet 1 tablet, oral, Daily, Multi For Her 50+    nitrofurantoin, macrocrystal-monohydrate, (Macrobid) 100 mg capsule Take 1 pill x2 per day for 3 days    omeprazole (PriLOSEC) 40 mg DR capsule TAKE 1 CAPSULE BY MOUTH TWICE  DAILY    simvastatin (Zocor) 20 mg tablet TAKE 1 TABLET BY MOUTH IN THE  EVENING      Vitals  BP  Readings from Last 2 Encounters:   10/11/24 147/77   05/02/24 110/58     BMI Readings from Last 1 Encounters:   10/11/24 38.74 kg/m²      Labs  A1C  Lab Results   Component Value Date    HGBA1C 5.6 02/29/2024    HGBA1C 5.8 07/01/2021    HGBA1C 5.3 11/30/2019     BMP  Lab Results   Component Value Date    CALCIUM 9.5 02/29/2024     02/29/2024    K 4.2 02/29/2024    CO2 21 02/29/2024     02/29/2024    BUN 35 (H) 02/29/2024    CREATININE 1.19 (H) 02/29/2024    EGFR 48 (L) 02/29/2024     LFTs  Lab Results   Component Value Date    ALT 17 02/29/2024    AST 26 02/29/2024    ALKPHOS 60 02/29/2024    BILITOT 0.7 02/29/2024     FLP  Lab Results   Component Value Date    TRIG 167 (H) 02/29/2024    CHOL 126 02/29/2024    LDLF 66 06/19/2023    LDLCALC 61 02/29/2024    HDL 31.4 02/29/2024     Urine Microalbumin  Lab Results   Component Value Date    MICROALBCREA 117.4 (H) 09/27/2022     Weight Management  Wt Readings from Last 3 Encounters:   10/11/24 109 kg (240 lb)   05/02/24 110 kg (242 lb)   04/25/24 110 kg (242 lb)      There is no height or weight on file to calculate BMI.     Assessment/Plan   Problem List Items Addressed This Visit       Asthma    Relevant Medications    fluticasone propion-salmeteroL (Advair Diskus) 100-50 mcg/dose diskus inhaler    albuterol (Ventolin HFA) 90 mcg/actuation inhaler     DISCUSSION/PLAN:  Patient with asthma with worsening pulmonary obstruction per last PFTs. She is currently not using any inhalers, but is compliant with BiPAP at night. She is willing to restart Advair at this time if able to afford it. She also needs a new albuterol inhaler. Will submit application for  Patient Assistance Program. Also recommended updated COVID vaccine.    CONTINUE  montelukast 10 mg daily  START  Albuterol (Ventolin) 2 puffs every 6 hours as needed for shortness of breath or wheezing  fluticasone/salmeterol (Advair) 100/50 mcg 1 puff twice daily    Monitoring and Education:  Counseled  patient on the mechanism of action, side effects, and monitoring of albuterol and Advair  Detailed administration on Advair dosing and administration to ensure proper technique  Instructed patient to rinse mouth after Advair use  Answered all patient questions    Continue all meds under the continuation of care with the referring provider and clinical pharmacy team.    Clinical Pharmacist follow-up: as needed pending  Patient Assistance Program determination  Orders placed: albuterol and Advair 100/50 mcg sent to [facility] for home delivery    Thank you,   Bernadette aBh, PharmD  Clinical Pharmacy Specialist, Primary Care   [phone]    Verbal consent to manage patient's drug therapy was obtained from the patient . Patient was informed she may decline to participate or withdraw from participation in pharmacy services at any time.

## 2024-10-21 PROCEDURE — RXMED WILLOW AMBULATORY MEDICATION CHARGE

## 2024-10-22 PROCEDURE — RXMED WILLOW AMBULATORY MEDICATION CHARGE

## 2024-10-24 ENCOUNTER — PHARMACY VISIT (OUTPATIENT)
Dept: PHARMACY | Facility: CLINIC | Age: 74
End: 2024-10-24
Payer: COMMERCIAL

## 2024-10-25 ENCOUNTER — TELEMEDICINE (OUTPATIENT)
Dept: PHARMACY | Facility: HOSPITAL | Age: 74
End: 2024-10-25
Payer: COMMERCIAL

## 2024-10-25 DIAGNOSIS — R32 URINARY INCONTINENCE, UNSPECIFIED TYPE: Primary | ICD-10-CM

## 2024-10-25 DIAGNOSIS — N39.0 RECURRENT UTI: ICD-10-CM

## 2024-10-25 PROCEDURE — RXMED WILLOW AMBULATORY MEDICATION CHARGE

## 2024-10-25 RX ORDER — VIBEGRON 75 MG/1
75 TABLET, FILM COATED ORAL DAILY
Qty: 30 TABLET | Refills: 1 | Status: SHIPPED | OUTPATIENT
Start: 2024-10-25

## 2024-10-25 NOTE — PROGRESS NOTES
Clinical Pharmacy Appointment    Patient ID: Patty Johnson is a 74 y.o. female who presents for overactive bladder.    Pt is here for First appointment for overactive bladder, previously seen for asthma    Referring Provider: Rufino Vaughan MD  Last visit with urology: 10/17/2024   Next visit with urology: 12/12/2024    PCP: Husam Sherwood DO   Last visit with PCP: 4/25/2024   Next visit with PCP: not yet scheduled     Patient Assistance for Advair, Ventolin, and Gemtesa approved through 10/17/2025. Will have to be renewed prior to that date to prevent lapse in coverage. Medication(s) will be received at no cost to patient from Replaced by [facility].      Subjective     HPI  Urinary Symptoms  Medications that may contribute to symptoms:   Diuretics - chlorthalidone  Calcium channel blockers (cause bladder to relax and affects emptying) - amlodipine  Any history of:   Narrow-angle glaucoma? No  Impaired gastric emptying? No  Urinary retention? No  Frequently of bowel movement? Last BM today, uses Miralax PRN  How many protective undergarments are you utilizing daily?   Patient wears pad daily  No skin irritation or breakdown at this time    What medications have been tried/stopped?   Botox   fesoterodine  Myrbetriq  solifenancin    Medication System Management  Patient's preferred pharmacy: Replaced by [facility] home delivery  Adherence/Organization: no concerns identified  Affordability/Accessibility: cost concerns with several meds  Currently enrolled in  Patient Assistance Program through 10/17/2025      Objective   Allergies   Allergen Reactions    Benazepril Cough    Hydroxychloroquine Unknown    Olmesartan Dizziness    Sulfamethoxazole-Trimethoprim Rash     Social History     Social History Narrative    Not on file      Medication Review  Current Outpatient Medications   Medication Instructions    albuterol (Ventolin HFA) 90 mcg/actuation inhaler 2 puffs, inhalation, Every 6 hours PRN    allopurinol (ZYLOPRIM) 100  mg, oral, Daily, as directed    amLODIPine (NORVASC) 10 mg, oral, Daily    aspirin 81 mg EC tablet 1 tablet, oral, Daily    atenoloL-chlorthalidone (Tenoretic) 100-25 mg tablet 1 tablet, oral, Daily    cholecalciferol (Vitamin D-3) 5,000 Units tablet 1 tablet, oral, Daily    fish oil concentrate (Omega-3) 120-180 mg capsule 2 g, oral, 2 times daily before meals    fluticasone propion-salmeteroL (Advair Diskus) 100-50 mcg/dose diskus inhaler 1 puff, inhalation, 2 times daily RT, Rinse mouth with water after use to reduce aftertaste and incidence of candidiasis. Do not swallow.    Gemtesa 75 mg, oral, Daily    losartan (COZAAR) 100 mg, oral, Daily    milk thistle seed extract 175 mg capsule Milk Thistle 175 MG Oral Capsule   Refills: 0        Start : 18-Sep-2018   Active    miscellaneous medical supply (Blood Pressure Cuff) misc 1 each, miscellaneous, 2 times daily    montelukast (SINGULAIR) 10 mg, oral, Nightly    mv-min-folic acid-lutein (Essential Woman 50 Plus) 0.4-250 mg-mcg tablet 1 tablet, oral, Daily, Multi For Her 50+    nitrofurantoin, macrocrystal-monohydrate, (Macrobid) 100 mg capsule Take 1 pill x2 per day for 3 days    omeprazole (PriLOSEC) 40 mg DR capsule TAKE 1 CAPSULE BY MOUTH TWICE  DAILY    simvastatin (Zocor) 20 mg tablet TAKE 1 TABLET BY MOUTH IN THE  EVENING      Vitals  BP Readings from Last 2 Encounters:   10/11/24 147/77   05/02/24 110/58     BMI Readings from Last 1 Encounters:   10/11/24 38.74 kg/m²      Labs  A1C  Lab Results   Component Value Date    HGBA1C 5.6 02/29/2024    HGBA1C 5.8 07/01/2021    HGBA1C 5.3 11/30/2019     BMP  Lab Results   Component Value Date    CALCIUM 9.5 02/29/2024     02/29/2024    K 4.2 02/29/2024    CO2 21 02/29/2024     02/29/2024    BUN 35 (H) 02/29/2024    CREATININE 1.19 (H) 02/29/2024    EGFR 48 (L) 02/29/2024     LFTs  Lab Results   Component Value Date    ALT 17 02/29/2024    AST 26 02/29/2024    ALKPHOS 60 02/29/2024    BILITOT 0.7  02/29/2024     FLP  Lab Results   Component Value Date    TRIG 167 (H) 02/29/2024    CHOL 126 02/29/2024    LDLF 66 06/19/2023    LDLCALC 61 02/29/2024    HDL 31.4 02/29/2024     Urine Microalbumin  Lab Results   Component Value Date    MICROALBCREA 117.4 (H) 09/27/2022     Weight Management  Wt Readings from Last 3 Encounters:   10/11/24 109 kg (240 lb)   05/02/24 110 kg (242 lb)   04/25/24 110 kg (242 lb)      There is no height or weight on file to calculate BMI.     Assessment/Plan   Problem List Items Addressed This Visit       Urinary incontinence - Primary    Relevant Medications    vibegron (Gemtesa) 75 mg tablet    Other Relevant Orders    Referral to Clinical Pharmacy     Other Visit Diagnoses       Recurrent UTI              DISCUSSION/PLAN:  Patient with overactive bladder and symptoms of frequency, urgency, and leaking. She has tried several other medications without efficacy. Will initiate Gemtesa at this time.    START:  vibegron (Gemtesa) 75 mg once daily    Monitoring and Education:  Counseled patient on Gemtesa mechanism of action, dosing, side effects, and monitoring  Discussed efficacy expectations and that she should see symptom improvement within the first week or two  Answered all patient questions    Continue all meds under the continuation of care with the referring provider and clinical pharmacy team.    Clinical Pharmacist follow-up: November 19th, 2024 at 2:30 PM  Orders placed: Gemtesa 75 mg to [facility] for delivery    Thank you,   Bernadette Bah, PharmD  Clinical Pharmacy Specialist, Primary Care   [phone]    Verbal consent to manage patient's drug therapy was obtained from the patient . Patient was informed she may decline to participate or withdraw from participation in pharmacy services at any time.

## 2024-10-28 DIAGNOSIS — Z79.899 MEDICATION MANAGEMENT: ICD-10-CM

## 2024-10-29 ENCOUNTER — PHARMACY VISIT (OUTPATIENT)
Dept: PHARMACY | Facility: CLINIC | Age: 74
End: 2024-10-29
Payer: COMMERCIAL

## 2024-11-04 DIAGNOSIS — I10 PRIMARY HYPERTENSION: ICD-10-CM

## 2024-11-04 DIAGNOSIS — J45.909 ASTHMA, UNSPECIFIED ASTHMA SEVERITY, UNSPECIFIED WHETHER COMPLICATED, UNSPECIFIED WHETHER PERSISTENT (HHS-HCC): ICD-10-CM

## 2024-11-04 RX ORDER — MONTELUKAST SODIUM 10 MG/1
10 TABLET ORAL NIGHTLY
Qty: 100 TABLET | Refills: 2 | Status: SHIPPED | OUTPATIENT
Start: 2024-11-04

## 2024-11-05 ENCOUNTER — APPOINTMENT (OUTPATIENT)
Dept: PHARMACY | Facility: HOSPITAL | Age: 74
End: 2024-11-05
Payer: MEDICARE

## 2024-11-05 DIAGNOSIS — Z79.899 MEDICATION MANAGEMENT: ICD-10-CM

## 2024-11-05 DIAGNOSIS — H04.123 DRY EYE SYNDROME OF BOTH EYES: Primary | ICD-10-CM

## 2024-11-05 RX ORDER — LOSARTAN POTASSIUM 100 MG/1
100 TABLET ORAL DAILY
Qty: 100 TABLET | Refills: 2 | Status: SHIPPED | OUTPATIENT
Start: 2024-11-05

## 2024-11-05 NOTE — PROGRESS NOTES
Clinical Pharmacy Appointment    Patient ID: Patty Johnson is a 74 y.o. female who presents for Dry Eye.    Patient is here for First appointment for dry eyes, previously seen for asthma and overactive bladder    Referring Provider: Husam Sherwood DO  PCP: Husam Sherwood DO   Last visit with PCP: 4/25/2024   Next visit with PCP: not yet scheduled     Patient Assistance for Advair, Ventolin, Gemtesa, and Restasis approved through 10/17/2025. Will have to be renewed prior to that date to prevent lapse in coverage. Medication(s) will be received at no cost to patient from [facility] the [facility].       Subjective     HPI  DRY EYES:    Last optometry exam: approximately 6 months ago, follows annually     Current medications:  Restasis - one drop in each eye twice daily    Patient has been on Restasis for many years. She denies side effects at this time, but she does occasionally forget the second dose.    Historical medications:  Many other drops/gels for dry eyes in the past - patient does not recall specifics    Symptoms  Patient reports severe symptoms prior to Restasis treatment, including during sleep  She had to stop wearing contact lenses   Medications that may be contributing to symptoms: atenolol/chlorthalidone and omeprazole    Medication System Management  Patient's preferred pharmacy: [facility] at the [facility] home delivery  Adherence/Organization: occasional missed doses of Restasis  Affordability/Accessibility: cost concerns with several medications  Currently enrolled in  Patient Assistance Program through 10/17/2025      Objective   Allergies   Allergen Reactions    Benazepril Cough    Hydroxychloroquine Unknown    Olmesartan Dizziness    Sulfamethoxazole-Trimethoprim Rash     Social History     Social History Narrative    Not on file      Medication Review  Current Outpatient Medications   Medication Instructions    albuterol (Ventolin HFA) 90 mcg/actuation inhaler 2 puffs, inhalation, Every 6 hours PRN     allopurinol (ZYLOPRIM) 100 mg, oral, Daily, as directed    amLODIPine (NORVASC) 10 mg, oral, Daily    aspirin 81 mg EC tablet 1 tablet, oral, Daily    atenoloL-chlorthalidone (Tenoretic) 100-25 mg tablet 1 tablet, oral, Daily    cholecalciferol (Vitamin D-3) 5,000 Units tablet 1 tablet, oral, Daily    cycloSPORINE (Restasis) 0.05 % ophthalmic emulsion 1 drop, Both Eyes, 2 times daily    fish oil concentrate (Omega-3) 120-180 mg capsule 2 g, oral, 2 times daily before meals    fluticasone propion-salmeteroL (Advair Diskus) 100-50 mcg/dose diskus inhaler 1 puff, inhalation, 2 times daily RT, Rinse mouth with water after use to reduce aftertaste and incidence of candidiasis. Do not swallow.    Gemtesa 75 mg, oral, Daily    losartan (COZAAR) 100 mg, oral, Daily    milk thistle seed extract 175 mg capsule Milk Thistle 175 MG Oral Capsule   Refills: 0        Start : 18-Sep-2018   Active    miscellaneous medical supply (Blood Pressure Cuff) misc 1 each, miscellaneous, 2 times daily    montelukast (SINGULAIR) 10 mg, oral, Nightly    mv-min-folic acid-lutein (Essential Woman 50 Plus) 0.4-250 mg-mcg tablet 1 tablet, oral, Daily, Multi For Her 50+    nitrofurantoin, macrocrystal-monohydrate, (Macrobid) 100 mg capsule Take 1 pill x2 per day for 3 days    omeprazole (PriLOSEC) 40 mg DR capsule TAKE 1 CAPSULE BY MOUTH TWICE  DAILY    simvastatin (Zocor) 20 mg tablet TAKE 1 TABLET BY MOUTH IN THE  EVENING      Vitals  BP Readings from Last 2 Encounters:   10/11/24 147/77   05/02/24 110/58     BMI Readings from Last 1 Encounters:   10/11/24 38.74 kg/m²      Labs  A1C  Lab Results   Component Value Date    HGBA1C 5.6 02/29/2024    HGBA1C 5.8 07/01/2021    HGBA1C 5.3 11/30/2019     BMP  Lab Results   Component Value Date    CALCIUM 9.5 02/29/2024     02/29/2024    K 4.2 02/29/2024    CO2 21 02/29/2024     02/29/2024    BUN 35 (H) 02/29/2024    CREATININE 1.19 (H) 02/29/2024    EGFR 48 (L) 02/29/2024     LFTs  Lab Results    Component Value Date    ALT 17 02/29/2024    AST 26 02/29/2024    ALKPHOS 60 02/29/2024    BILITOT 0.7 02/29/2024     FLP  Lab Results   Component Value Date    TRIG 167 (H) 02/29/2024    CHOL 126 02/29/2024    LDLF 66 06/19/2023    LDLCALC 61 02/29/2024    HDL 31.4 02/29/2024     Urine Microalbumin  Lab Results   Component Value Date    MICROALBCREA 117.4 (H) 09/27/2022     Weight Management  Wt Readings from Last 3 Encounters:   10/11/24 109 kg (240 lb)   05/02/24 110 kg (242 lb)   04/25/24 110 kg (242 lb)      There is no height or weight on file to calculate BMI.     Assessment/Plan   Problem List Items Addressed This Visit       Dry eye syndrome of both eyes - Primary    Relevant Medications    cycloSPORINE (Restasis) 0.05 % ophthalmic emulsion     Other Visit Diagnoses       Medication management                DISCUSSION/PLAN:  Patient with dry eyes and symptoms currently stable on Restasis treatment after trying several alternatives in the past. She denies side effects or vision changes at this time. Patient follows with an eye doctor yearly. Will continue current therapy and follow up in two weeks as previously scheduled.    CONTINUE:  Restasis - one drop in each eye twice daily    Monitoring and Education:  Counseled on Restasis mechanism of action, dosing, storage, side effects, and monitoring  Detailed discussion of Restasis administration to ensure proper technique  Advised patient to contact eye doctor or PCP for any vision changes  Answered all patient questions      Continue all meds under the continuation of care with the referring provider and clinical pharmacy team.    Clinical Pharmacist follow-up: November 19th, 2024 at 2:30 PM  Orders placed: Restasis to [facility] for home delivery    Thank you,   Bernadette Bah, PharmD  Clinical Pharmacy Specialist, Primary Care   [phone]    Verbal consent to manage patient's drug therapy was obtained from the patient . Patient was informed she may  decline to participate or withdraw from participation in pharmacy services at any time.

## 2024-11-06 DIAGNOSIS — I10 PRIMARY HYPERTENSION: ICD-10-CM

## 2024-11-06 DIAGNOSIS — H04.123 DRY EYE SYNDROME OF BOTH EYES: Primary | ICD-10-CM

## 2024-11-06 PROCEDURE — RXMED WILLOW AMBULATORY MEDICATION CHARGE

## 2024-11-06 RX ORDER — CYCLOSPORINE 0.5 MG/ML
1 EMULSION OPHTHALMIC 2 TIMES DAILY
Qty: 60 EACH | Refills: 2 | Status: SHIPPED | OUTPATIENT
Start: 2024-11-06

## 2024-11-07 RX ORDER — AMLODIPINE BESYLATE 10 MG/1
10 TABLET ORAL DAILY
Qty: 100 TABLET | Refills: 2 | Status: SHIPPED | OUTPATIENT
Start: 2024-11-07

## 2024-11-09 ENCOUNTER — PHARMACY VISIT (OUTPATIENT)
Dept: PHARMACY | Facility: CLINIC | Age: 74
End: 2024-11-09
Payer: COMMERCIAL

## 2024-11-19 ENCOUNTER — APPOINTMENT (OUTPATIENT)
Dept: PHARMACY | Facility: HOSPITAL | Age: 74
End: 2024-11-19
Payer: MEDICARE

## 2024-11-19 DIAGNOSIS — H04.123 DRY EYE SYNDROME OF BOTH EYES: Primary | ICD-10-CM

## 2024-11-19 DIAGNOSIS — R32 URINARY INCONTINENCE, UNSPECIFIED TYPE: ICD-10-CM

## 2024-11-19 DIAGNOSIS — J45.20 MILD INTERMITTENT ASTHMA WITHOUT COMPLICATION (HHS-HCC): ICD-10-CM

## 2024-11-19 PROCEDURE — RXMED WILLOW AMBULATORY MEDICATION CHARGE

## 2024-11-22 PROCEDURE — RXMED WILLOW AMBULATORY MEDICATION CHARGE

## 2024-11-22 RX ORDER — VIBEGRON 75 MG/1
75 TABLET, FILM COATED ORAL DAILY
Qty: 30 TABLET | Refills: 2 | Status: SHIPPED | OUTPATIENT
Start: 2024-11-22

## 2024-11-22 NOTE — PROGRESS NOTES
Clinical Pharmacy Appointment    Patient ID: Patty Johnson is a 74 y.o. female who presents for Asthma and Overactive Bladder.    Pt is here for Follow Up appointment.     Referring Provider: Zack Diamond MD  Last visit with pulmonology: 10/11/2024   Next visit with pulmonology: 10/10/2025    Referring Provider: Rufino Vaughan MD  Last visit with urology: 10/17/2024   Next visit with urology: 2024    PCP: Husam Sherwood DO   Last visit with PCP: 2024   Next visit with PCP: not yet scheduled     Patient Assistance for Advair, Ventolin, Gemtesa, and Restasis approved through 10/17/2025. Will have to be renewed prior to that date to prevent lapse in coverage. Medication(s) will be received at no cost to patient from [facility].       Subjective     HPI  ASTHMA  Patient has been diagnosed with: Asthma, diagnosed as an adult  Following with pulmonologist: Yes  Smoking history: never been a smoker    Current Regimen  fluticasone/salmeterol 100-50 mcg, 1 puff twice daily  montelukast 10 mg daily  Ventolin (albuterol), 2 puffs every 6 hours as needed    Historical Treatment  albuterol - patient was not using,   Advair - stopped due to cost and patient did not feel it was helping    Inhaler Use/Technique  Has not yet needed to use PRN albuterol  She reports adherence to fluticasone/salmeterol, correct technique per description, rinsing mouth after    Asthma Severity  Symptoms/week: daily with exertion  Primary Symptoms: dyspnea, non-productive cough, and wheezing  Aggravating Factors: exercise, going up stairs  Alleviating Factors: rest  Nighttime awakenings: occasionally   RUFUS use: rarely  Interference with normal activity: minor limitation    Pulmonary Function Tests from 10/7/2024  Indicate mild obstruction      Exacerbation History:  When was your last hospitalization for an exacerbation? None  When was the last time you were treated with antibiotics and/or steroids? Frequently  treated with antibiotics for UTIs     Immunization History:  Influenza: up to date, received for this season  Pneumonia: up to date  COVID: recommended booster dose  RSV: up to date      URINARY SYMPTOMS  Following with urologist: Yes    Current Regimen  Gemtessa (vibegron) 75 mg daily    Historical Treatment  Botox  festoterodine  Myrbetriq  solifenacin    Medications that may contribute to symptoms  Diuretics - chlorthalidone  Calcium channel blockers (cause bladder to relax and affects emptying) - amlodipine    PMH Review  Narrow-angle glaucoma? No  Impaired gastric emptying? No  Urinary retention? No    Symptom Management  Protective Undergarments  Wears pads daily, reports some leakage  No skin irritation or breakdown at this time  Bowel Movements  Last BM yesterday, uses Miralax PRN      Medication System Management  Patient's preferred pharmacy:  [facility] home delivery  Adherence/Organization: no concerns identified  Affordability/Accessibility: cost concerns with several medications  Currently enrolled in  Patient Assistance Program      Objective   Allergies   Allergen Reactions    Benazepril Cough    Hydroxychloroquine Unknown    Olmesartan Dizziness    Sulfamethoxazole-Trimethoprim Rash     Social History     Social History Narrative    Not on file      Medication Review  Current Outpatient Medications   Medication Instructions    albuterol (Ventolin HFA) 90 mcg/actuation inhaler 2 puffs, inhalation, Every 6 hours PRN    allopurinol (ZYLOPRIM) 100 mg, oral, Daily, as directed    amLODIPine (NORVASC) 10 mg, oral, Daily    aspirin 81 mg EC tablet 1 tablet, oral, Daily    atenoloL-chlorthalidone (Tenoretic) 100-25 mg tablet 1 tablet, oral, Daily    cholecalciferol (Vitamin D-3) 5,000 Units tablet 1 tablet, oral, Daily    cycloSPORINE (Restasis) 0.05 % ophthalmic emulsion 1 drop, Both Eyes, 2 times daily    fish oil concentrate (Omega-3) 120-180 mg capsule 2 g, oral, 2 times daily before meals     fluticasone propion-salmeteroL (Advair Diskus) 100-50 mcg/dose diskus inhaler 1 puff, inhalation, 2 times daily RT, Rinse mouth with water after use to reduce aftertaste and incidence of candidiasis. Do not swallow.    Gemtesa 75 mg, oral, Daily    losartan (COZAAR) 100 mg, oral, Daily    milk thistle seed extract 175 mg capsule Milk Thistle 175 MG Oral Capsule   Refills: 0        Start : 18-Sep-2018   Active    miscellaneous medical supply (Blood Pressure Cuff) misc 1 each, miscellaneous, 2 times daily    montelukast (SINGULAIR) 10 mg, oral, Nightly    mv-min-folic acid-lutein (Essential Woman 50 Plus) 0.4-250 mg-mcg tablet 1 tablet, oral, Daily, Multi For Her 50+    nitrofurantoin, macrocrystal-monohydrate, (Macrobid) 100 mg capsule Take 1 pill x2 per day for 3 days    omeprazole (PriLOSEC) 40 mg DR capsule TAKE 1 CAPSULE BY MOUTH TWICE  DAILY    simvastatin (Zocor) 20 mg tablet TAKE 1 TABLET BY MOUTH IN THE  EVENING      Vitals  BP Readings from Last 2 Encounters:   10/11/24 147/77   05/02/24 110/58     BMI Readings from Last 1 Encounters:   10/11/24 38.74 kg/m²      Labs  A1C  Lab Results   Component Value Date    HGBA1C 5.6 02/29/2024    HGBA1C 5.8 07/01/2021    HGBA1C 5.3 11/30/2019     BMP  Lab Results   Component Value Date    CALCIUM 9.5 02/29/2024     02/29/2024    K 4.2 02/29/2024    CO2 21 02/29/2024     02/29/2024    BUN 35 (H) 02/29/2024    CREATININE 1.19 (H) 02/29/2024    EGFR 48 (L) 02/29/2024     LFTs  Lab Results   Component Value Date    ALT 17 02/29/2024    AST 26 02/29/2024    ALKPHOS 60 02/29/2024    BILITOT 0.7 02/29/2024     FLP  Lab Results   Component Value Date    TRIG 167 (H) 02/29/2024    CHOL 126 02/29/2024    LDLF 66 06/19/2023    LDLCALC 61 02/29/2024    HDL 31.4 02/29/2024     Urine Microalbumin  Lab Results   Component Value Date    MICROALBCREA 117.4 (H) 09/27/2022     Weight Management  Wt Readings from Last 3 Encounters:   10/11/24 109 kg (240 lb)   05/02/24 110 kg  (242 lb)   04/25/24 110 kg (242 lb)      There is no height or weight on file to calculate BMI.     Assessment/Plan   Problem List Items Addressed This Visit       Asthma    Relevant Orders    Referral to Clinical Pharmacy    Urinary incontinence    Relevant Medications    vibegron (Gemtesa) 75 mg tablet    Other Relevant Orders    Referral to Clinical Pharmacy    Dry eye syndrome of both eyes - Primary    Relevant Orders    Referral to Clinical Pharmacy       DISCUSSION/PLAN:  Patient with asthma and worsening pulmonary obstruction per last PFTs. She has recently restarted Advair and is tolerating well with correct inhaler technique. She reports one missed dose and not always taking at the same time of day. Patient denies noticeable symptom improvement, but she has not required the short-acting albuterol. Will continue current regimen and follow up in approximately 6 weeks.    Patient also has overactive bladder and symptoms of frequency, urgency, and leaking. She reports significant symptom improvement since starting Gemtesa. She is able to sleep longer during the night and notices less leaking during the day. She denies side effects or missed doses at this time.     CONTINUE ASTHMA MEDICATIONS  fluticasone/salmeterol 100-50 mcg, 1 puff twice daily  montelukast 10 mg once daily  Ventolin (albuterol), 2 puffs every 6 hours as needed    CONTINUE OVERACTIVE BLADDER MEDICATIONS  Gemtesa 75 mg once daily    Monitoring and Education:  Counseled patient on the mechanism of action, side effects, and monitoring of asthma medications  Reviewed Advair dosing and administration, including rinsing mouth after  Discussed mechanism of action, dosing, side effects, and monitoring of Gemtesa  Answered all patient questions    Continue all meds under the continuation of care with the referring provider and clinical pharmacy team.    Clinical Pharmacist follow-up: January 7th, 2025 at 2:30 PM  Orders placed: Gemtesa refill sent to  [facility] for home delivery    Thank you,   Bernadette Bah, PharmD  Clinical Pharmacy Specialist, Primary Care   [phone]    Verbal consent to manage patient's drug therapy was obtained from the patient . Patient was informed she may decline to participate or withdraw from participation in pharmacy services at any time.

## 2024-11-23 PROCEDURE — RXMED WILLOW AMBULATORY MEDICATION CHARGE

## 2024-11-26 ENCOUNTER — PHARMACY VISIT (OUTPATIENT)
Dept: PHARMACY | Facility: CLINIC | Age: 74
End: 2024-11-26
Payer: COMMERCIAL

## 2024-12-02 PROCEDURE — RXMED WILLOW AMBULATORY MEDICATION CHARGE

## 2024-12-04 ENCOUNTER — PHARMACY VISIT (OUTPATIENT)
Dept: PHARMACY | Facility: CLINIC | Age: 74
End: 2024-12-04
Payer: COMMERCIAL

## 2024-12-05 PROCEDURE — RXMED WILLOW AMBULATORY MEDICATION CHARGE

## 2024-12-09 ENCOUNTER — PHARMACY VISIT (OUTPATIENT)
Dept: PHARMACY | Facility: CLINIC | Age: 74
End: 2024-12-09
Payer: COMMERCIAL

## 2024-12-12 ENCOUNTER — APPOINTMENT (OUTPATIENT)
Dept: UROLOGY | Facility: CLINIC | Age: 74
End: 2024-12-12
Payer: MEDICARE

## 2024-12-12 DIAGNOSIS — N32.81 OAB (OVERACTIVE BLADDER): Primary | ICD-10-CM

## 2024-12-12 PROCEDURE — 1123F ACP DISCUSS/DSCN MKR DOCD: CPT | Performed by: STUDENT IN AN ORGANIZED HEALTH CARE EDUCATION/TRAINING PROGRAM

## 2024-12-12 PROCEDURE — 99214 OFFICE O/P EST MOD 30 MIN: CPT | Performed by: STUDENT IN AN ORGANIZED HEALTH CARE EDUCATION/TRAINING PROGRAM

## 2024-12-12 PROCEDURE — G2211 COMPLEX E/M VISIT ADD ON: HCPCS | Performed by: STUDENT IN AN ORGANIZED HEALTH CARE EDUCATION/TRAINING PROGRAM

## 2024-12-16 ENCOUNTER — APPOINTMENT (OUTPATIENT)
Dept: PRIMARY CARE | Facility: CLINIC | Age: 74
End: 2024-12-16
Payer: MEDICARE

## 2024-12-16 VITALS
WEIGHT: 242 LBS | BODY MASS INDEX: 39.06 KG/M2 | SYSTOLIC BLOOD PRESSURE: 138 MMHG | DIASTOLIC BLOOD PRESSURE: 83 MMHG | HEART RATE: 88 BPM | OXYGEN SATURATION: 93 %

## 2024-12-16 DIAGNOSIS — M85.80 OSTEOPENIA, UNSPECIFIED LOCATION: ICD-10-CM

## 2024-12-16 DIAGNOSIS — G56.01 CARPAL TUNNEL SYNDROME OF RIGHT WRIST: Primary | ICD-10-CM

## 2024-12-16 DIAGNOSIS — R06.02 SOB (SHORTNESS OF BREATH): ICD-10-CM

## 2024-12-16 DIAGNOSIS — E78.5 HYPERLIPIDEMIA, UNSPECIFIED HYPERLIPIDEMIA TYPE: ICD-10-CM

## 2024-12-16 DIAGNOSIS — Z12.31 ENCOUNTER FOR SCREENING MAMMOGRAM FOR MALIGNANT NEOPLASM OF BREAST: ICD-10-CM

## 2024-12-16 DIAGNOSIS — I10 PRIMARY HYPERTENSION: ICD-10-CM

## 2024-12-16 PROCEDURE — 1126F AMNT PAIN NOTED NONE PRSNT: CPT | Performed by: INTERNAL MEDICINE

## 2024-12-16 PROCEDURE — 1159F MED LIST DOCD IN RCRD: CPT | Performed by: INTERNAL MEDICINE

## 2024-12-16 PROCEDURE — 99214 OFFICE O/P EST MOD 30 MIN: CPT | Performed by: INTERNAL MEDICINE

## 2024-12-16 PROCEDURE — 3075F SYST BP GE 130 - 139MM HG: CPT | Performed by: INTERNAL MEDICINE

## 2024-12-16 PROCEDURE — 3079F DIAST BP 80-89 MM HG: CPT | Performed by: INTERNAL MEDICINE

## 2024-12-16 PROCEDURE — 1123F ACP DISCUSS/DSCN MKR DOCD: CPT | Performed by: INTERNAL MEDICINE

## 2024-12-16 ASSESSMENT — PAIN SCALES - GENERAL: PAINLEVEL_OUTOF10: 0-NO PAIN

## 2024-12-16 NOTE — PATIENT INSTRUCTIONS
Cardiology [phone]    DEXA and mamm [phone]    Dr. Moore for hand -- [phone]    Get lipid panel with Dr. Barba labs in May     You have essential tremor, will leave alone for now. If you get worse we can start med or have you see neuro     Prevnar 20    6 months

## 2024-12-16 NOTE — PROGRESS NOTES
"Subjective   Patient ID: Patty Johnson is a 74 y.o. female who presents for Back Pain, Hand Pain, and Shortness of Breath (Heart palpitations).    HPI     Reports having right index and middle finger numbness x several years. She has right carpal tunnel release many years ago but states symptoms have returned.     Also reports b/l hand tremor when she hold something or when writes, started x 6 months. No resting tremor. No gait change or mental status change.     Reports lower back pain that radiates superiorly when she coughs or sneezes. Pain has since improved over the past 2-3 weeks. No radiculopathy     Reports like her heart is \"going to beat out of my chest.\" Denies CP LE edema, orthopnea or palps. Happens mostly when she exerts herself. Does have baseline SOB , is unchanged.         Review of Systems   All other systems reviewed and are negative.      Objective   /83   Pulse 88   Wt 110 kg (242 lb)   SpO2 93%   BMI 39.06 kg/m²     Physical Exam  Constitutional:       Appearance: Normal appearance.   HENT:      Head: Normocephalic and atraumatic.   Cardiovascular:      Rate and Rhythm: Normal rate and regular rhythm.      Heart sounds: Normal heart sounds. No murmur heard.     No gallop.   Pulmonary:      Effort: Pulmonary effort is normal. No respiratory distress.      Breath sounds: No wheezing or rales.   Skin:     General: Skin is warm and dry.      Findings: No rash.   Neurological:      Mental Status: She is alert and oriented to person, place, and time. Mental status is at baseline.   Psychiatric:         Mood and Affect: Mood normal.         Behavior: Behavior normal.         Assessment/Plan       #Right carpal tunnel   -Refer to Dr. Moore     #Essential tremor   -Monitor for now     #Chest pain   -Refer to cardiology     #HTN  -well controlled at home  -cont losartan 100mg daily, atenolol-chlorthalidone 100-25mg daily  -cont norvasc to 10mg daily      #CKD stage 3a/b  -Cont ARB/statin "   -cont 10mg Jardiance today  -limit salt intake  -follow up with nephrology  -refilled Jardiance today     #Left hip osteoarthritis   -left total hip replacement on 8/28  -follows with [facility] - Dr. Rodas   -no complaints     #HLD  -cont simvastatin 20mg daily  -Order lipid panel      #Mixed incontinence / urgency dominant  -follows with Dr. Vaughan  -botox 200 units 1/8/24  -will do pelvic floor exercise.      #Rectocele  -following with PT / Dr. Vaughan     #Osteopenia  -2000u vit d daily     #Asthma  -follows with pulm  -cont singulair, flonase, proair     #CELIA  -compliant with [employer]     Influenza: 11/3/2021  Shingrex: x2  RSV: 2/19/24  PCV 13: x2  Prevnar 20: recommended  Mammo: 4/26/2022 -order today   Colorectal: 07/2015  Dexa: 12/19/2020--order today      RTC 6 months

## 2024-12-19 PROCEDURE — RXMED WILLOW AMBULATORY MEDICATION CHARGE

## 2024-12-21 PROCEDURE — RXMED WILLOW AMBULATORY MEDICATION CHARGE

## 2024-12-23 ENCOUNTER — PHARMACY VISIT (OUTPATIENT)
Dept: PHARMACY | Facility: CLINIC | Age: 74
End: 2024-12-23
Payer: COMMERCIAL

## 2024-12-26 PROCEDURE — RXMED WILLOW AMBULATORY MEDICATION CHARGE

## 2024-12-27 ENCOUNTER — HOSPITAL ENCOUNTER (OUTPATIENT)
Dept: RADIOLOGY | Facility: HOSPITAL | Age: 74
Discharge: HOME | End: 2024-12-27
Payer: MEDICARE

## 2024-12-27 VITALS — BODY MASS INDEX: 38.57 KG/M2 | HEIGHT: 66 IN | WEIGHT: 240 LBS

## 2024-12-27 DIAGNOSIS — M85.80 OSTEOPENIA, UNSPECIFIED LOCATION: ICD-10-CM

## 2024-12-27 DIAGNOSIS — Z12.31 ENCOUNTER FOR SCREENING MAMMOGRAM FOR MALIGNANT NEOPLASM OF BREAST: ICD-10-CM

## 2024-12-27 PROCEDURE — 77080 DXA BONE DENSITY AXIAL: CPT

## 2024-12-27 PROCEDURE — 77067 SCR MAMMO BI INCL CAD: CPT

## 2024-12-30 ENCOUNTER — PHARMACY VISIT (OUTPATIENT)
Dept: PHARMACY | Facility: CLINIC | Age: 74
End: 2024-12-30
Payer: COMMERCIAL

## 2024-12-31 ENCOUNTER — APPOINTMENT (OUTPATIENT)
Dept: RADIOLOGY | Facility: HOSPITAL | Age: 74
End: 2024-12-31
Payer: MEDICARE

## 2025-01-03 ENCOUNTER — APPOINTMENT (OUTPATIENT)
Dept: CARDIOLOGY | Facility: HOSPITAL | Age: 75
End: 2025-01-03
Payer: MEDICARE

## 2025-01-07 ENCOUNTER — APPOINTMENT (OUTPATIENT)
Dept: PHARMACY | Facility: HOSPITAL | Age: 75
End: 2025-01-07
Payer: MEDICARE

## 2025-01-07 DIAGNOSIS — J45.20 MILD INTERMITTENT ASTHMA WITHOUT COMPLICATION (HHS-HCC): ICD-10-CM

## 2025-01-07 DIAGNOSIS — H04.123 DRY EYE SYNDROME OF BOTH EYES: ICD-10-CM

## 2025-01-07 DIAGNOSIS — R32 URINARY INCONTINENCE, UNSPECIFIED TYPE: ICD-10-CM

## 2025-01-07 RX ORDER — CYCLOSPORINE 0.5 MG/ML
1 EMULSION OPHTHALMIC 2 TIMES DAILY
Qty: 60 EACH | Refills: 2 | Status: SHIPPED | OUTPATIENT
Start: 2025-01-07

## 2025-01-07 RX ORDER — VIBEGRON 75 MG/1
75 TABLET, FILM COATED ORAL DAILY
Qty: 30 TABLET | Refills: 2 | Status: SHIPPED | OUTPATIENT
Start: 2025-01-07

## 2025-01-07 RX ORDER — FLUTICASONE PROPIONATE AND SALMETEROL 100; 50 UG/1; UG/1
1 POWDER RESPIRATORY (INHALATION)
Qty: 60 EACH | Refills: 2 | Status: SHIPPED | OUTPATIENT
Start: 2025-01-07

## 2025-01-07 NOTE — PROGRESS NOTES
Clinical Pharmacy Appointment    Patient ID: Patty Johnson is a 74 y.o. female who presents for Asthma, Dry Eye, and Overactive Bladder.    Pt is here for Follow Up appointment.     Referring Provider:Zack Diamond MD  Last visit with pulmonology: 10/11/2024   Next visit with pulmonology: 10/10/2025    Referring Provider: Rufino Vaughan MD  Last visit with urology: 2024   Next visit with urology: 3/13/2025    PCP: Husam Sherwood DO   Last visit with PCP: 2024   Next visit with PCP: 2025     Patient Assistance for Advair, Ventolin, Gemtesa, and Restasis approved through 10/17/2025. Will have to be renewed prior to that date to prevent lapse in coverage. Medication(s) will be received at no cost to [facility].     INTERVAL HISTORY   Patient's last appointment with clinical pharmacy team on 2024  Recent hospitalizations? No   Medication changes? No   Missed doses of medications? Yes   Occasional missed doses of Restasis and late doses of Advair  Side effects? No   Updated relevant labs? No       Subjective     HPI  ASTHMA  Patient has been diagnosed with: Asthma, diagnosed as an adult  Following with pulmonologist: Yes  Smoking history: never been a smoker    Current Regimen  fluticasone/salmeterol 100-50 mcg, 1 puff twice daily  montelukast 10 mg daily  Ventolin (albuterol), 2 puffs every 6 hours as needed    Historical Treatment  albuterol - patient was not using,   Advair - stopped due to cost and patient did not feel it was helping    Inhaler Use/Technique  Has not yet needed to use PRN albuterol  She reports adherence to fluticasone/salmeterol, correct technique per description, rinsing mouth after    Asthma Severity  Symptoms/week: daily with exertion  Primary Symptoms: dyspnea, non-productive cough, and wheezing  Aggravating Factors: exercise, going up stairs  Alleviating Factors: rest  Nighttime awakenings: occasionally   RUFUS use: rarely  Interference  with normal activity: minor limitation    Pulmonary Function Tests from 10/7/2024  Indicate mild obstruction      Exacerbation History:  When was your last hospitalization for an exacerbation? None  When was the last time you were treated with antibiotics and/or steroids? Frequently treated with antibiotics for UTIs     Immunization History:  Influenza: up to date, received for this season  Pneumonia: up to date  COVID: recommended booster dose  RSV: up to date      URINARY SYMPTOMS  Following with urologist: Yes    Current Regimen  Gemtessa (vibegron) 75 mg daily    Historical Treatment  Botox  festoterodine  Myrbetriq  solifenacin    Medications that may contribute to symptoms  Diuretics - chlorthalidone  Calcium channel blockers (cause bladder to relax and affects emptying) - amlodipine    PMH Review  Narrow-angle glaucoma? No  Impaired gastric emptying? No  Urinary retention? No    Symptom Management  Protective Undergarments  Wears pads daily, reports some leakage  No skin irritation or breakdown at this time  Bowel Movements  Last BM 2-3 days ago  Reports more constipation recently - has been using PRN docusate, Miralax, senna      DRY EYES:    Last optometry exam: approximately 6 months ago, follows annually     Current medications:  Restasis - one drop in each eye twice daily     Historical medications:  Many other drops/gels for dry eyes in the past - patient does not recall specifics     Symptoms  Patient reports severe symptoms prior to Restasis treatment, including during sleep  She had to stop wearing contact lenses   Medications that may be contributing to symptoms: atenolol/chlorthalidone and omeprazole      Medication System Management  Patient's preferred pharmacy: [facility] / [facility] home delivery  Adherence/Organization: no concerns identified  Affordability/Accessibility: cost concerns with several medications  Currently enrolled in  Patient Assistance Program      Objective   Allergies   Allergen  Reactions    Benazepril Cough    Hydroxychloroquine Unknown    Olmesartan Dizziness    Sulfamethoxazole-Trimethoprim Rash    Vancomycin Itching and Rash     Social History     Social History Narrative    Not on file      Medication Review  Current Outpatient Medications   Medication Instructions    albuterol (Ventolin HFA) 90 mcg/actuation inhaler 2 puffs, inhalation, Every 6 hours PRN    allopurinol (ZYLOPRIM) 100 mg, oral, Daily, as directed    amLODIPine (NORVASC) 10 mg, oral, Daily    aspirin 81 mg EC tablet 1 tablet, Daily    atenoloL-chlorthalidone (Tenoretic) 100-25 mg tablet 1 tablet, oral, Daily    cholecalciferol (Vitamin D-3) 5,000 Units tablet 1 tablet, Daily    cycloSPORINE (Restasis) 0.05 % ophthalmic emulsion 1 drop, Both Eyes, 2 times daily    fish oil concentrate (Omega-3) 120-180 mg capsule 2 g, 2 times daily before meals    fluticasone propion-salmeteroL (Advair Diskus) 100-50 mcg/dose diskus inhaler 1 puff, inhalation, 2 times daily RT, Rinse mouth with water after use to reduce aftertaste and incidence of candidiasis. Do not swallow.    Gemtesa 75 mg, oral, Daily    losartan (COZAAR) 100 mg, oral, Daily    milk thistle seed extract 175 mg capsule Milk Thistle 175 MG Oral Capsule   Refills: 0        Start : 18-Sep-2018   Active    miscellaneous medical supply (Blood Pressure Cuff) misc 1 each, miscellaneous, 2 times daily    montelukast (SINGULAIR) 10 mg, oral, Nightly    mv-min-folic acid-lutein (Essential Woman 50 Plus) 0.4-250 mg-mcg tablet 1 tablet, Daily    omeprazole (PriLOSEC) 40 mg DR capsule TAKE 1 CAPSULE BY MOUTH TWICE  DAILY    simvastatin (Zocor) 20 mg tablet TAKE 1 TABLET BY MOUTH IN THE  EVENING      Vitals  BP Readings from Last 2 Encounters:   12/16/24 138/83   10/11/24 147/77     BMI Readings from Last 1 Encounters:   12/27/24 38.74 kg/m²      Labs  A1C  Lab Results   Component Value Date    HGBA1C 5.6 02/29/2024    HGBA1C 5.8 07/01/2021    HGBA1C 5.3 11/30/2019     BMP  Lab  Results   Component Value Date    CALCIUM 9.5 02/29/2024     02/29/2024    K 4.2 02/29/2024    CO2 21 02/29/2024     02/29/2024    BUN 35 (H) 02/29/2024    CREATININE 1.19 (H) 02/29/2024    EGFR 48 (L) 02/29/2024     LFTs  Lab Results   Component Value Date    ALT 17 02/29/2024    AST 26 02/29/2024    ALKPHOS 60 02/29/2024    BILITOT 0.7 02/29/2024     FLP  Lab Results   Component Value Date    TRIG 167 (H) 02/29/2024    CHOL 126 02/29/2024    LDLF 66 06/19/2023    LDLCALC 61 02/29/2024    HDL 31.4 02/29/2024     Urine Microalbumin  Lab Results   Component Value Date    MICROALBCREA 117.4 (H) 09/27/2022     Weight Management  Wt Readings from Last 3 Encounters:   12/27/24 109 kg (240 lb)   12/16/24 110 kg (242 lb)   10/11/24 109 kg (240 lb)      There is no height or weight on file to calculate BMI.     Assessment/Plan   Problem List Items Addressed This Visit       Asthma    Relevant Medications    fluticasone propion-salmeteroL (Advair Diskus) 100-50 mcg/dose diskus inhaler    Other Relevant Orders    Referral to Clinical Pharmacy    Urinary incontinence    Relevant Medications    vibegron (Gemtesa) 75 mg tablet    Other Relevant Orders    Referral to Clinical Pharmacy    Dry eye syndrome of both eyes    Relevant Medications    cycloSPORINE (Restasis) 0.05 % ophthalmic emulsion    Other Relevant Orders    Referral to Clinical Pharmacy       DISCUSSION/PLAN:  Patient with asthma currently following with pulmonology. She has recently restarted Advair and is tolerating well with correct inhaler technique. She denies side effects but endorses occasional late doses. Symptoms are stable at this time, and she has not used albuterol inhaler since last pharmacy appointment. Will continue current regimen and follow up in three months or sooner if needed.    Patient also has overactive bladder currently following with urology. She continues to have significant improvement in symptoms of frequency, urgency, and  leaking since starting Gemtesa. She is able to sleep longer during the night and is able to take breaks from wearing protective pads during the day. She denies side effects or missed doses at this time. Will continue current regimen and follow up in three months or sooner if needed.    Patient also has dry eyes with symptoms currently stable on Restasis. She follows with eye doctor regularly and denies side effects or vision changes at this time. Will continue current regimen and follow up in three months or sooner if needed.    CONTINUE ASTHMA MEDICATIONS  fluticasone/salmeterol 100-50 mcg, 1 puff twice daily  montelukast 10 mg once daily  Ventolin (albuterol), 2 puffs every 6 hours as needed    CONTINUE OVERACTIVE BLADDER MEDICATIONS  Gemtesa 75 mg once daily    CONTINUE DRY EYE MEDICATIONS  Restasis - one drop in each eye twice daily    Monitoring and Education:  Counseled patient on the mechanism of action, dosing, drug interactions, side effects, and monitoring of asthma medications, Gemtesa, and Restasis  Reviewed Advair dosing and administration, including rinsing mouth after  Answered all patient questions    Continue all meds under the continuation of care with the referring provider and clinical pharmacy team.    Clinical Pharmacist follow-up: April 1st, 2025 at 2:00 PM  Orders placed: Advair, Gemtesa, Restasis refills sent to [facility] for home delivery    Thank you,   Bernadette Bah, PharmD  Clinical Pharmacy Specialist, Primary Care   [phone]    Verbal consent to manage patient's drug therapy was obtained from the patient . Patient was informed she may decline to participate or withdraw from participation in pharmacy services at any time.

## 2025-01-13 ENCOUNTER — APPOINTMENT (OUTPATIENT)
Dept: ORTHOPEDIC SURGERY | Facility: CLINIC | Age: 75
End: 2025-01-13
Payer: MEDICARE

## 2025-01-13 DIAGNOSIS — G56.01 CARPAL TUNNEL SYNDROME OF RIGHT WRIST: ICD-10-CM

## 2025-01-13 DIAGNOSIS — G56.03 BILATERAL CARPAL TUNNEL SYNDROME: Primary | ICD-10-CM

## 2025-01-13 PROCEDURE — 20526 THER INJECTION CARP TUNNEL: CPT | Performed by: ORTHOPAEDIC SURGERY

## 2025-01-13 PROCEDURE — 1123F ACP DISCUSS/DSCN MKR DOCD: CPT | Performed by: ORTHOPAEDIC SURGERY

## 2025-01-13 PROCEDURE — 99204 OFFICE O/P NEW MOD 45 MIN: CPT | Performed by: ORTHOPAEDIC SURGERY

## 2025-01-13 PROCEDURE — 1159F MED LIST DOCD IN RCRD: CPT | Performed by: ORTHOPAEDIC SURGERY

## 2025-01-13 PROCEDURE — 1036F TOBACCO NON-USER: CPT | Performed by: ORTHOPAEDIC SURGERY

## 2025-01-13 RX ORDER — LIDOCAINE HYDROCHLORIDE 10 MG/ML
1 INJECTION, SOLUTION INFILTRATION; PERINEURAL
Status: COMPLETED | OUTPATIENT
Start: 2025-01-13 | End: 2025-01-13

## 2025-01-13 RX ORDER — TRIAMCINOLONE ACETONIDE 40 MG/ML
40 INJECTION, SUSPENSION INTRA-ARTICULAR; INTRAMUSCULAR
Status: COMPLETED | OUTPATIENT
Start: 2025-01-13 | End: 2025-01-13

## 2025-01-13 RX ADMIN — TRIAMCINOLONE ACETONIDE 40 MG: 40 INJECTION, SUSPENSION INTRA-ARTICULAR; INTRAMUSCULAR at 23:21

## 2025-01-13 RX ADMIN — LIDOCAINE HYDROCHLORIDE 1 ML: 10 INJECTION, SOLUTION INFILTRATION; PERINEURAL at 23:21

## 2025-01-13 ASSESSMENT — PAIN - FUNCTIONAL ASSESSMENT: PAIN_FUNCTIONAL_ASSESSMENT: NO/DENIES PAIN

## 2025-01-14 NOTE — PROGRESS NOTES
History of Present Illness:  Chief Complaint   Patient presents with    Left Hand - Numbness    Right Hand - Numbness       The patient presents today for evaluation of bilateral hand pain.  The patient endorses numbness and tingling (predominantly radial three digits).  There is no current neck pain or cervical spine issues.  She has tried bracing with some degree of relief.  Patient's past surgical history is notable for right carpal tunnel release about 15 years ago, but symptoms began recurring a few years ago.  The patient notes the pain is worse with elevated hand activities and at night.  The patient denies any recent trauma.  The pain is sharp, electrical in nature.       Past Medical History:   Diagnosis Date    Personal history of other diseases of the musculoskeletal system and connective tissue     History of arthritis    Personal history of other diseases of the respiratory system     History of asthma       Medication Documentation Review Audit       Reviewed by Katya Tavera CMA (Medical Assistant) on 01/13/25 at 1331      Medication Order Taking? Sig Documenting Provider Last Dose Status   albuterol (Ventolin HFA) 90 mcg/actuation inhaler [id number]  Inhale 2 puffs every 6 hours if needed for wheezing or shortness of breath. Zack Diamond MD  Active   allopurinol (Zyloprim) 100 mg tablet [id number] No TAKE 1 TABLET BY MOUTH DAILY AS  DIRECTED Roselia Barba MD Taking Active   amLODIPine (Norvasc) 10 mg tablet [id number]  TAKE 1 TABLET BY MOUTH ONCE  DAILY Husam Sherwood DO  Active   aspirin 81 mg EC tablet [id number]  Take 1 tablet (81 mg) by mouth once daily. Historical Provider, MD  Active   atenoloL-chlorthalidone (Tenoretic) 100-25 mg tablet [id number] No TAKE 1 TABLET BY MOUTH DAILY Husam Shrewood DO Taking Active   cholecalciferol (Vitamin D-3) 5,000 Units tablet [id number] No Take 1 tablet (5,000 Units) by mouth once daily. Historical Provider, MD Taking Active     Discontinued 01/07/25 1542    cycloSPORINE (Restasis) 0.05 % ophthalmic emulsion [id number]  Administer 1 drop into both eyes 2 times a day. Husam Sherwood DO  Active   fish oil concentrate (Omega-3) 120-180 mg capsule [id number] No Take 2 capsules (2 g) by mouth 2 times a day before meals. Historical Provider, MD Taking Active     Discontinued 01/07/25 1553   fluticasone propion-salmeteroL (Advair Diskus) 100-50 mcg/dose diskus inhaler [id number]  Inhale 1 puff 2 times a day. Rinse mouth with water after use to reduce aftertaste and incidence of candidiasis. Do not swallow. Zack Diamond MD  Active   losartan (Cozaar) 100 mg tablet [id number]  TAKE 1 TABLET BY MOUTH ONCE  DAILY Husam Sherwood DO  Active   milk thistle seed extract 175 mg capsule [id number] No Milk Thistle 175 MG Oral Capsule   Refills: 0        Start : 18-Sep-2018   Active Historical Provider, MD Taking Active   miscellaneous medical supply (Blood Pressure Cuff) misc [id number] No 1 each 2 times a day. Husam Sherwood DO Taking Active   montelukast (Singulair) 10 mg tablet [id number]  TAKE 1 TABLET BY MOUTH AT  BEDTIME Zack Diamond MD  Active   mv-min-folic acid-lutein (Essential Woman 50 Plus) 0.4-250 mg-mcg tablet [id number] No Take 1 tablet by mouth once daily. Multi For Her 50+ Historical Provider, MD Taking Active   omeprazole (PriLOSEC) 40 mg DR capsule [id number] No TAKE 1 CAPSULE BY MOUTH TWICE  DAILY Husam Sherwood DO Taking Active   onabotulinumtoxinA (Botox) injection 200 Units [id number]   Rufino Vaughan MD  Active   simvastatin (Zocor) 20 mg tablet [id number] No TAKE 1 TABLET BY MOUTH IN THE  EVENING Husam Sherwood DO Taking Active     Discontinued 01/07/25 1551   vibegron (Gemtesa) 75 mg tablet [id number]  Take 1 tablet (75 mg) by mouth once daily. Rufino Vaughan MD  Active                    Allergies   Allergen Reactions    Benazepril Cough    Hydroxychloroquine Unknown    Olmesartan Dizziness    Sulfamethoxazole-Trimethoprim Rash    Vancomycin Itching and Rash       Social  History     Socioeconomic History    Marital status:      Spouse name: Not on file    Number of children: Not on file    Years of education: Not on file    Highest education level: Not on file   Occupational History    Not on file   Tobacco Use    Smoking status: Never    Smokeless tobacco: Never   Vaping Use    Vaping status: Never Used   Substance and Sexual Activity    Alcohol use: Not on file    Drug use: Not on file    Sexual activity: Not on file   Other Topics Concern    Not on file   Social History Narrative    Not on file     Social Drivers of Health     Financial Resource Strain: Not on file   Food Insecurity: Not on file   Transportation Needs: Not on file   Physical Activity: Not on file   Stress: Not on file   Social Connections: Not on file   Intimate Partner Violence: Not on file   Housing Stability: Not on file       Past Surgical History:   Procedure Laterality Date    CT ABDOMEN PELVIS ANGIOGRAM W AND/OR WO IV CONTRAST  5/13/2014    CT ABDOMEN PELVIS ANGIOGRAM W AND/OR WO IV CONTRAST 5/13/2014 [facility]    CT ABDOMEN PELVIS ANGIOGRAM W AND/OR WO IV CONTRAST  5/17/2018    CT ABDOMEN PELVIS ANGIOGRAM W AND/OR WO IV CONTRAST 5/17/2018 [facility]    CT ABDOMEN PELVIS ANGIOGRAM W AND/OR WO IV CONTRAST  12/5/2019    CT ABDOMEN PELVIS ANGIOGRAM W AND/OR WO IV CONTRAST 12/5/2019 [facility]    CT ABDOMEN PELVIS ANGIOGRAM W AND/OR WO IV CONTRAST  12/9/2016    CT ABDOMEN PELVIS ANGIOGRAM W AND/OR WO IV CONTRAST 12/9/2016 [facility]    CT ABDOMEN PELVIS ANGIOGRAM W AND/OR WO IV CONTRAST  8/14/2023    CT ABDOMEN PELVIS ANGIOGRAM W AND/OR WO IV CONTRAST 8/14/2023 [facility] CT    OTHER SURGICAL HISTORY  03/04/2020    Cataract surgery    OTHER SURGICAL HISTORY  03/04/2020    Elbow surgery    OTHER SURGICAL HISTORY  03/04/2020    Tonsillectomy    OTHER SURGICAL HISTORY  09/18/2018    Esophageal Dilation    TOTAL HIP ARTHROPLASTY  09/08/2018    Total Hip Replacement           Review of Systems   GENERAL: Negative for malaise, significant weight loss, fever  MUSCULOSKELETAL: see HPI  NEURO:  see HPI     Physical Examination:  Constitutional: Appears well-developed and well-nourished.  Head: Normocephalic and atraumatic.  Eyes: EOMI grossly  Cardiovascular: Intact distal pulses.   Respiratory: Effort normal. No respiratory distress.  Neurologic: Alert and oriented to person, place, and time.  Skin: Skin is warm and dry.  Hematologic / Lymphatic: No lymphedema, lymphangitis.  Psychiatric: normal mood and affect. Behavior is normal.   Musculoskeletal:  bilateral wrist/hands:  No obvious swelling or masses  Well-healed surgical scar overlying carpal tunnel on the right  No appreciable thenar atrophy  No atrophy noted of hypothenar eminence   Positive Tinel's at carpal tunnel  + Median nerve compression test  + Jodi's test  Decreased sensation to light touch in median nerve distribution  5/5 thumb Abduction, 5/5 Finger Abduction  Radial pulse palpable  Good capillary refill        Assessment:  Patient with bilateral carpal tunnel syndrome, recurrence on the right     Plan:  We reviewed the disease process with the patient.  We discussed the role for electrodiagnostic testing and treatment decision making, immobilization, and injections.  We discussed surgical intervention reviewing the risks (neurologic injury, wound issues including infection, pillar pain, and recurrence) and benefits.  The patient elected for: Corticosteroid injections.  She understands that this will not provide permanent relief, but will hopefully provide 3 to 6 months of improved symptoms.  If this does provide good relief that surgical intervention would be indicated in the future.  She will follow-up if persistent/recurrent symptoms.    Patient ID: Patty Johnson is a 74 y.o. female.    Hand / UE Inj/Asp: bilateral carpal tunnel for carpal tunnel syndrome on 1/13/2025 11:21 PM  Indications: pain and  therapeutic  Details: 25 G needle, volar approach  Medications (Right): 40 mg triamcinolone acetonide 40 mg/mL; 1 mL lidocaine 10 mg/mL (1 %)  Medications (Left): 40 mg triamcinolone acetonide 40 mg/mL; 1 mL lidocaine 10 mg/mL (1 %)  Procedure, treatment alternatives, risks and benefits explained, specific risks discussed. Consent was given by the patient. Immediately prior to procedure a time out was called to verify the correct patient, procedure, equipment, support staff and site/side marked as required. Patient was prepped and draped in the usual sterile fashion.

## 2025-01-15 DIAGNOSIS — J45.20 MILD INTERMITTENT ASTHMA WITHOUT COMPLICATION (HHS-HCC): ICD-10-CM

## 2025-01-15 RX ORDER — ALBUTEROL SULFATE 90 UG/1
2 INHALANT RESPIRATORY (INHALATION) EVERY 6 HOURS PRN
Qty: 18 G | Refills: 3 | OUTPATIENT
Start: 2025-01-15 | End: 2026-01-15

## 2025-01-18 PROCEDURE — RXMED WILLOW AMBULATORY MEDICATION CHARGE

## 2025-01-20 ENCOUNTER — APPOINTMENT (OUTPATIENT)
Dept: CARDIOLOGY | Facility: HOSPITAL | Age: 75
End: 2025-01-20
Payer: MEDICARE

## 2025-01-20 PROCEDURE — RXMED WILLOW AMBULATORY MEDICATION CHARGE

## 2025-01-21 ENCOUNTER — PHARMACY VISIT (OUTPATIENT)
Dept: PHARMACY | Facility: CLINIC | Age: 75
End: 2025-01-21
Payer: COMMERCIAL

## 2025-01-31 PROCEDURE — RXMED WILLOW AMBULATORY MEDICATION CHARGE

## 2025-01-31 NOTE — PROGRESS NOTES
Primary Care Physician: Husam Sherwood DO   Date of Visit: 02/04/2025 11:00 AM EST  Type of Visit: New      Chief Complaint:  No chief complaint on file.       HPI  Patty Johnson 74 y.o. female with a PMH of CKD3, HTN, asthma, HLD, CELIA on Bipap presents today for cardiac evaluation. TTE 10/2022 showed LVEF 65-70%, severely dilated LA, and mildly elevated RVSP. NM stress test in 10/2018 showed no ischemia. EKG in the past has been normal. Medications include amlopdine 10 mg daily, aspirin 81 mg daily, atenolol-chlorthalidone 100-25 mg daily, losartan 100 mg daily, simvastatin 20 mg daily.     Has noticed increasing SOB on exertion over the past several months. Denies chest pain. Has had palpitations at rest or exertion. Weight is down 5 lbs. Has had recent trouble with Bipap machine.     Review of Systems   Review of Systems   Respiratory:  Positive for shortness of breath.    Cardiovascular:  Positive for palpitations. Negative for chest pain.   Psychiatric/Behavioral:  Positive for sleep disturbance.       12 points review of systems are negative expect for the above    Social History:  Social History     Socioeconomic History    Marital status:      Spouse name: Not on file    Number of children: Not on file    Years of education: Not on file    Highest education level: Not on file   Occupational History    Not on file   Tobacco Use    Smoking status: Never    Smokeless tobacco: Never   Vaping Use    Vaping status: Never Used   Substance and Sexual Activity    Alcohol use: Not on file    Drug use: Not on file    Sexual activity: Not on file   Other Topics Concern    Not on file   Social History Narrative    Not on file     Social Drivers of Health     Financial Resource Strain: Not on file   Food Insecurity: Not on file   Transportation Needs: Not on file   Physical Activity: Not on file   Stress: Not on file   Social Connections: Not on file   Intimate Partner Violence: Not on file   Housing Stability:  Not on file        Past Medical History:  Past Medical History:   Diagnosis Date    Personal history of other diseases of the musculoskeletal system and connective tissue     History of arthritis    Personal history of other diseases of the respiratory system     History of asthma       Past Surgical History:  Past Surgical History:   Procedure Laterality Date    CT ABDOMEN PELVIS ANGIOGRAM W AND/OR WO IV CONTRAST  5/13/2014    CT ABDOMEN PELVIS ANGIOGRAM W AND/OR WO IV CONTRAST 5/13/2014 [facility]    CT ABDOMEN PELVIS ANGIOGRAM W AND/OR WO IV CONTRAST  5/17/2018    CT ABDOMEN PELVIS ANGIOGRAM W AND/OR WO IV CONTRAST 5/17/2018 [facility]    CT ABDOMEN PELVIS ANGIOGRAM W AND/OR WO IV CONTRAST  12/5/2019    CT ABDOMEN PELVIS ANGIOGRAM W AND/OR WO IV CONTRAST 12/5/2019 [facility]    CT ABDOMEN PELVIS ANGIOGRAM W AND/OR WO IV CONTRAST  12/9/2016    CT ABDOMEN PELVIS ANGIOGRAM W AND/OR WO IV CONTRAST 12/9/2016 [facility]    CT ABDOMEN PELVIS ANGIOGRAM W AND/OR WO IV CONTRAST  8/14/2023    CT ABDOMEN PELVIS ANGIOGRAM W AND/OR WO IV CONTRAST 8/14/2023 [facility] CT    OTHER SURGICAL HISTORY  03/04/2020    Cataract surgery    OTHER SURGICAL HISTORY  03/04/2020    Elbow surgery    OTHER SURGICAL HISTORY  03/04/2020    Tonsillectomy    OTHER SURGICAL HISTORY  09/18/2018    Esophageal Dilation    TOTAL HIP ARTHROPLASTY  09/08/2018    Total Hip Replacement       Family History:  Family History   Problem Relation Name Age of Onset    Stroke Mother          cerebrovascular accident (CVA)    Heart block Father      Esophageal cancer Brother      Cancer Other      Hypertension Other          Objective:   Physical Exam  Vitals reviewed.   Constitutional:       Appearance: Normal appearance.   HENT:      Head: Normocephalic and atraumatic.   Neck:      Vascular: No carotid bruit or JVD.   Cardiovascular:      Rate and Rhythm: Normal rate and regular rhythm.      Pulses: Normal pulses.      Heart  "sounds: Normal heart sounds.   Pulmonary:      Effort: Pulmonary effort is normal.      Breath sounds: Normal breath sounds.   Chest:      Chest wall: No tenderness.   Abdominal:      General: Abdomen is flat. Bowel sounds are normal.      Palpations: Abdomen is soft.   Skin:     General: Skin is warm and dry.   Neurological:      General: No focal deficit present.      Mental Status: She is alert and oriented to person, place, and time. Mental status is at baseline.   Psychiatric:         Mood and Affect: Mood normal.         Behavior: Behavior normal.             4/25/2024     1:35 PM 5/2/2024     3:11 PM 5/2/2024     3:14 PM 10/11/2024     3:53 PM 12/16/2024     5:25 PM 12/27/2024     4:22 PM 2/4/2025    11:11 AM   Vitals   Systolic 133 117 110 147 138  143   Diastolic 70 51 58 77 83  58   BP Location  Left arm Left arm       Heart Rate 55 52  60 88     Temp  36.1 °C (96.9 °F)        Resp  16  16      Height 1.651 m (5' 5\") 1.676 m (5' 6\")    1.676 m (5' 6\")    Weight (lb) 242 242  240 242 240 235.01   BMI 40.27 kg/m2 39.06 kg/m2  38.74 kg/m2 39.06 kg/m2 38.74 kg/m2 37.93 kg/m2   BSA (m2) 2.25 m2 2.26 m2  2.25 m2 2.26 m2 2.25 m2 2.23 m2   Visit Report Report Report Report Report Report  Report        Allergies:  Allergies   Allergen Reactions    Benazepril Cough    Hydroxychloroquine Unknown    Olmesartan Dizziness    Sulfamethoxazole-Trimethoprim Rash    Vancomycin Itching and Rash       Medications:  Current Outpatient Medications   Medication Instructions    albuterol (Ventolin HFA) 90 mcg/actuation inhaler 2 puffs, inhalation, Every 6 hours PRN    allopurinol (ZYLOPRIM) 100 mg, oral, Daily, as directed    amLODIPine (NORVASC) 10 mg, oral, Daily    aspirin 81 mg EC tablet 1 tablet, Daily    atenoloL-chlorthalidone (Tenoretic) 100-25 mg tablet 1 tablet, oral, Daily    cholecalciferol (Vitamin D-3) 5,000 Units tablet 1 tablet, Daily    cycloSPORINE (Restasis) 0.05 % ophthalmic emulsion 1 drop, Both Eyes, 2 times " daily    fish oil concentrate (Omega-3) 120-180 mg capsule 2 g, 2 times daily before meals    fluticasone propion-salmeteroL (Advair Diskus) 100-50 mcg/dose diskus inhaler 1 puff, inhalation, 2 times daily RT, Rinse mouth with water after use to reduce aftertaste and incidence of candidiasis. Do not swallow.    Gemtesa 75 mg, oral, Daily    losartan (COZAAR) 100 mg, oral, Daily    milk thistle seed extract 175 mg capsule Milk Thistle 175 MG Oral Capsule   Refills: 0        Start : 18-Sep-2018   Active    miscellaneous medical supply (Blood Pressure Cuff) misc 1 each, miscellaneous, 2 times daily    montelukast (SINGULAIR) 10 mg, oral, Nightly    mv-min-folic acid-lutein (Essential Woman 50 Plus) 0.4-250 mg-mcg tablet 1 tablet, Daily    omeprazole (PriLOSEC) 40 mg DR capsule TAKE 1 CAPSULE BY MOUTH TWICE  DAILY    simvastatin (Zocor) 20 mg tablet TAKE 1 TABLET BY MOUTH IN THE  EVENING        Labs and Imaging:     Lab Results   Component Value Date    WBC 4.8 02/29/2024    HGB 11.8 (L) 02/29/2024    HCT 35.3 (L) 02/29/2024     02/29/2024    CHOL 126 02/29/2024    TRIG 167 (H) 02/29/2024    HDL 31.4 02/29/2024    ALT 17 02/29/2024    AST 26 02/29/2024     02/29/2024    K 4.2 02/29/2024     02/29/2024    CREATININE 1.19 (H) 02/29/2024    BUN 35 (H) 02/29/2024    CO2 21 02/29/2024    TSH 1.88 01/08/2021    INR 1.1 09/08/2018    HGBA1C 5.6 02/29/2024         Echocardiogram:   Echocardiogram     [facility], [address] [place] [address]  Tel [phone] and Fax [phone]    TRANSTHORACIC ECHOCARDIOGRAM REPORT      Patient Name:     SUSAN VAZ THELMA Reading Physician:   [id number] Ashvin Novoa MD  Study Date:       10/10/2022       Referring Physician: ROD SHERWOOD  MRN/PID:          [id number]         PCP:                 Rod Sherwood DO  Accession/Order#: [id number]     Department Location: [facility]  YOB: 1950        Fellow:  Gender:            F                Nurse:  Admit Date:                        Sonographer:         Kadie Rodriguez [facility]  Admission Status: Outpatient       Additional Staff:  Height:           167.64 cm        CC Report to:  Weight:           104.33 kg        Study Type:          Echocardiogram  BSA:              2.12 m2  Blood Pressure: 153 /57 mmHg    Diagnosis/ICD: R06.09-Other forms of dyspnea  Indication:    Dyspnea on exertion  Procedure/CPT: Echo Complete w Full [id number]    Patient History:  Pertinent History: Dyspnea.    Study Detail: The following Echo studies were performed: 2D, M-Mode, Doppler and  color flow. Technically challenging study due to body habitus.      PHYSICIAN INTERPRETATION:  Left Ventricle: The left ventricular systolic function is normal, with an estimated ejection fraction of 65-70%. There are no regional wall motion abnormalities. The left ventricular cavity size is normal. There is left ventricular concentric remodeling. Spectral Doppler shows a pseudonormal pattern of left ventricular diastolic filling. There is an elevated mean left atrial pressure.  Left Atrium: The left atrium is severely dilated.  Right Ventricle: The right ventricle is normal in size. There is normal right ventricular global systolic function.  Right Atrium: The right atrium is moderately dilated.  Aortic Valve: The aortic valve is trileaflet. There is trivial aortic valve regurgitation. The peak instantaneous gradient of the aortic valve is 14.6 mmHg.  Mitral Valve: The mitral valve is normal in structure. There is trace to mild mitral valve regurgitation.  Tricuspid Valve: The tricuspid valve is structurally normal. There is mild tricuspid regurgitation. The Doppler estimated RVSP is mildly elevated at 37.3 mmHg.  Pulmonic Valve: The pulmonic valve is not well visualized. There is physiologic pulmonic valve regurgitation.  Pericardium: There is no pericardial effusion noted.  Aorta: The aortic root is normal.  Systemic  Veins: The inferior vena cava appears to be of normal size. There is IVC inspiratory collapse greater than 50%.  In comparison to the previous echocardiogram(s): There are no prior studies on this patient for comparison purposes.      CONCLUSIONS:  1. Left ventricular systolic function is normal with a 65-70% estimated ejection fraction.  2. Spectral Doppler shows a pseudonormal pattern of left ventricular diastolic filling.  3. There is an elevated mean left atrial pressure.  4. The left atrium is severely dilated.  5. The right atrium is moderately dilated.  6. Mildly elevated RVSP.    QUANTITATIVE DATA SUMMARY:  2D MEASUREMENTS:  Normal Ranges:  LAs:           4.14 cm   (2.7-4.0cm)  IVSd:          1.01 cm   (0.6-1.1cm)  LVPWd:         1.15 cm   (0.6-1.1cm)  LVIDd:         4.26 cm   (3.9-5.9cm)  LVIDs:         2.53 cm  LV Mass Index: 73.5 g/m2  LV % FS        40.7 %    LA VOLUME:  Normal Ranges:  LA Vol A4C:        89.8 ml    (22+/-6mL/m2)  LA Vol A2C:        90.0 ml  LA Vol BP:         92.9 ml  LA Vol Index A4C:  42.3 ml/m2  LA Vol Index A2C:  42.4 ml/m2  LA Vol Index BP:   43.8 ml/m2  LA Area A4C:       25.8 cm2  LA Area A2C:       25.0 cm2  LA Major Axis A4C: 6.3 cm  LA Major Axis A2C: 5.9 cm  LA Volume Index:   43.7 ml/m2  LA Vol A4C:        82.9 ml  LA Vol A2C:        84.2 ml    M-MODE MEASUREMENTS:  Normal Ranges:  Ao Root: 3.10 cm (2.0-3.7cm)    LV SYSTOLIC FUNCTION BY 2D PLANIMETRY (MOD):  Normal Ranges:  EF-A4C View: 72.3 % (>55%)  EF-A2C View: 73.7 %  EF-Biplane:  72.9 %    LV DIASTOLIC FUNCTION:  Normal Ranges:  MV Peak E:        1.19 m/s    (0.7-1.2 m/s)  MV Peak A:        1.10 m/s    (0.42-0.7 m/s)  E/A Ratio:        1.08        (1.0-2.2)  MV lateral e'     0.07 m/s  MV medial e'      0.06 m/s  MV A Dur:         170.70 msec  PulmV Sys Del:    97.51 cm/s  PulmV Parra Del:   58.82 cm/s  PulmV S/D Del:    1.66  PulmV A Revs Del: 35.72 cm/s  PulmV A Revs Dur: 119.95 msec    MITRAL VALVE:  Normal  Ranges:  MV Vmax:    1.28 m/s (<1.3m/s)  MV peak P.6 mmHg (<5mmHg)  MV mean P.5 mmHg (<2mmHg)  MV VTI:     45.89 cm (10-13cm)  MV DT:      247 msec (150-240msec)    AORTIC VALVE:  Normal Ranges:  AoV Vmax:      1.91 m/s  (<1.7m/s)  AoV Peak P.6 mmHg (<20mmHg)  LVOT Max Del:  1.38 m/s  (<1.1m/s)  LVOT VTI:      30.67 cm  LVOT Diameter: 1.95 cm   (1.8-2.4cm)  AoV Area,Vmax: 2.14 cm2  (2.5-4.5cm2)    RIGHT VENTRICLE:  RV 1   4.2 cm  RV 2   2.9 cm  RV 3   7.8 cm  TAPSE: 34.0 mm    TRICUSPID VALVE/RVSP:  Normal Ranges:  Peak TR Velocity: 2.93 m/s  Est. RA Pressure: 3 mmHg  RV Syst Pressure: 37.3 mmHg (< 30mmHg)    PULMONIC VALVE:  Normal Ranges:  PV Accel Time: 76 msec  (>120ms)  PV Max Del:    1.1 m/s  (0.6-0.9m/s)  PV Max P.4 mmHg    Pulmonary Veins:  PulmV A Revs Dur: 119.95 msec  PulmV A Revs Del: 35.72 cm/s  PulmV Parra Del:   58.82 cm/s  PulmV S/D Del:    1.66  PulmV Sys Del:    97.51 cm/s    AORTA:  Asc Ao Diam 3.27 cm      [id number] Ashvin Novoa MD  Electronically signed on 10/11/2022 at 2:15:48 PM      Stress Testing  NM Stress test   (10/31/2018)  IMPRESSION:  1. Normal stress myocardial perfusion imaging in response to  pharmacologic stress.  2. Well-maintained left ventricular function.  The LVEF is estimated  at 66%.  3. The left ventricle is normal in size.    Cardiac Catheterization: No results found for this or any previous visit from the past  days.    Cardiac Scoring: No results found for this or any previous visit from the past  days.    AAA : No results found for this or any previous visit from the past 182 days.    OTHER: No results found for this or any previous visit from the past 1825 days.      The ASCVD Risk score (Nicole BURT, et al., 2019) failed to calculate for the following reasons:    The valid total cholesterol range is 130 to 320 mg/dL       Assessment:   74 y.o. female with a PMH of CKD3, HTN, asthma, HLD, CELIA on Bipap presents today for cardiac evaluation. TTE  10/2022 showed LVEF 65-70%, severely dilated LA, and mildly elevated RVSP. NM stress test in 10/2018 showed no ischemia. Has noticed increasing SOB on exertion in addition to palpitations over the past several months. Denies chest pain. Weight is down 5 lbs, no edema or orthopnea. EKG today shows SB HR 52 bpm.     1. SOB (shortness of breath)  Referral to Cardiology    ECG 12 lead (Clinic Performed)    Transthoracic Echo (TTE) Complete      2. Palpitations  Holter or Event Cardiac Monitor      3. Primary hypertension        4. Mixed hyperlipidemia             Plan:  -Continue aspirin 81 mg daily, simvastatin 20 mg daily  -Continue amlopdine 10 mg daily, atenolol-chlorthalidone 100-25 mg daily, losartan 100 mg daily  -Ambulatory ECG monitor  -TTE   -Will call with results  ____________________________________________________________  Adriano Valencia CNP  Cardiovascular Medicine   [facility] [place]  [facility]    Lab review: I have personally reviewed the laboratory result(s) CBC, CMP, Lipid panel  Diagnostic review: I have personally reviewed the result(s) of the EKG, Echocardiogram, NM stress test

## 2025-02-04 ENCOUNTER — OFFICE VISIT (OUTPATIENT)
Dept: CARDIOLOGY | Facility: HOSPITAL | Age: 75
End: 2025-02-04
Payer: MEDICARE

## 2025-02-04 ENCOUNTER — PHARMACY VISIT (OUTPATIENT)
Dept: PHARMACY | Facility: CLINIC | Age: 75
End: 2025-02-04
Payer: COMMERCIAL

## 2025-02-04 VITALS
DIASTOLIC BLOOD PRESSURE: 58 MMHG | WEIGHT: 235.01 LBS | BODY MASS INDEX: 37.93 KG/M2 | SYSTOLIC BLOOD PRESSURE: 143 MMHG

## 2025-02-04 DIAGNOSIS — R00.2 PALPITATIONS: ICD-10-CM

## 2025-02-04 DIAGNOSIS — R06.02 SOB (SHORTNESS OF BREATH): Primary | ICD-10-CM

## 2025-02-04 DIAGNOSIS — I10 PRIMARY HYPERTENSION: ICD-10-CM

## 2025-02-04 DIAGNOSIS — E78.2 MIXED HYPERLIPIDEMIA: ICD-10-CM

## 2025-02-04 LAB
ATRIAL RATE: 52 BPM
P AXIS: 35 DEGREES
P OFFSET: 199 MS
P ONSET: 149 MS
PR INTERVAL: 140 MS
Q ONSET: 219 MS
QRS COUNT: 8 BEATS
QRS DURATION: 80 MS
QT INTERVAL: 426 MS
QTC CALCULATION(BAZETT): 396 MS
QTC FREDERICIA: 406 MS
R AXIS: 17 DEGREES
T AXIS: -7 DEGREES
T OFFSET: 432 MS
VENTRICULAR RATE: 52 BPM

## 2025-02-04 PROCEDURE — 93005 ELECTROCARDIOGRAM TRACING: CPT | Performed by: NURSE PRACTITIONER

## 2025-02-04 PROCEDURE — 1159F MED LIST DOCD IN RCRD: CPT | Performed by: NURSE PRACTITIONER

## 2025-02-04 PROCEDURE — 99204 OFFICE O/P NEW MOD 45 MIN: CPT | Performed by: NURSE PRACTITIONER

## 2025-02-04 PROCEDURE — 1123F ACP DISCUSS/DSCN MKR DOCD: CPT | Performed by: NURSE PRACTITIONER

## 2025-02-04 PROCEDURE — 3078F DIAST BP <80 MM HG: CPT | Performed by: NURSE PRACTITIONER

## 2025-02-04 PROCEDURE — 99214 OFFICE O/P EST MOD 30 MIN: CPT | Performed by: NURSE PRACTITIONER

## 2025-02-04 PROCEDURE — 3077F SYST BP >= 140 MM HG: CPT | Performed by: NURSE PRACTITIONER

## 2025-02-04 ASSESSMENT — ENCOUNTER SYMPTOMS
SHORTNESS OF BREATH: 1
PALPITATIONS: 1
SLEEP DISTURBANCE: 1

## 2025-02-11 ENCOUNTER — HOSPITAL ENCOUNTER (OUTPATIENT)
Dept: CARDIOLOGY | Facility: HOSPITAL | Age: 75
Discharge: HOME | End: 2025-02-11
Payer: MEDICARE

## 2025-02-11 DIAGNOSIS — R06.02 SOB (SHORTNESS OF BREATH): ICD-10-CM

## 2025-02-11 DIAGNOSIS — R00.2 PALPITATIONS: ICD-10-CM

## 2025-02-11 LAB
AORTIC VALVE MEAN GRADIENT: 8 MMHG
AORTIC VALVE PEAK VELOCITY: 1.84 M/S
AV PEAK GRADIENT: 14 MMHG
AVA (PEAK VEL): 1.94 CM2
AVA (VTI): 2.1 CM2
EJECTION FRACTION APICAL 4 CHAMBER: 61.7
EJECTION FRACTION: 63 %
LEFT ATRIUM VOLUME AREA LENGTH INDEX BSA: 33.6 ML/M2
LEFT VENTRICLE INTERNAL DIMENSION DIASTOLE: 4.23 CM (ref 3.5–6)
LEFT VENTRICULAR OUTFLOW TRACT DIAMETER: 1.96 CM
MITRAL VALVE E/A RATIO: 0.89
RIGHT VENTRICLE FREE WALL PEAK S': 13 CM/S
RIGHT VENTRICLE PEAK SYSTOLIC PRESSURE: 26.5 MMHG
TRICUSPID ANNULAR PLANE SYSTOLIC EXCURSION: 3.2 CM

## 2025-02-11 PROCEDURE — 93246 EXT ECG>7D<15D RECORDING: CPT

## 2025-02-11 PROCEDURE — 93306 TTE W/DOPPLER COMPLETE: CPT

## 2025-02-11 PROCEDURE — 93306 TTE W/DOPPLER COMPLETE: CPT | Performed by: STUDENT IN AN ORGANIZED HEALTH CARE EDUCATION/TRAINING PROGRAM

## 2025-02-17 PROCEDURE — RXMED WILLOW AMBULATORY MEDICATION CHARGE

## 2025-02-18 ENCOUNTER — OFFICE VISIT (OUTPATIENT)
Dept: PULMONOLOGY | Facility: CLINIC | Age: 75
End: 2025-02-18
Payer: MEDICARE

## 2025-02-18 VITALS
DIASTOLIC BLOOD PRESSURE: 75 MMHG | SYSTOLIC BLOOD PRESSURE: 124 MMHG | HEART RATE: 59 BPM | WEIGHT: 238.7 LBS | BODY MASS INDEX: 38.53 KG/M2 | RESPIRATION RATE: 16 BRPM | OXYGEN SATURATION: 98 %

## 2025-02-18 DIAGNOSIS — J45.909 ASTHMA, UNSPECIFIED ASTHMA SEVERITY, UNSPECIFIED WHETHER COMPLICATED, UNSPECIFIED WHETHER PERSISTENT (HHS-HCC): Primary | ICD-10-CM

## 2025-02-18 DIAGNOSIS — G47.33 OSA (OBSTRUCTIVE SLEEP APNEA): ICD-10-CM

## 2025-02-18 PROCEDURE — 3074F SYST BP LT 130 MM HG: CPT | Performed by: INTERNAL MEDICINE

## 2025-02-18 PROCEDURE — 1159F MED LIST DOCD IN RCRD: CPT | Performed by: INTERNAL MEDICINE

## 2025-02-18 PROCEDURE — 3078F DIAST BP <80 MM HG: CPT | Performed by: INTERNAL MEDICINE

## 2025-02-18 PROCEDURE — 99213 OFFICE O/P EST LOW 20 MIN: CPT | Performed by: INTERNAL MEDICINE

## 2025-02-18 PROCEDURE — 1160F RVW MEDS BY RX/DR IN RCRD: CPT | Performed by: INTERNAL MEDICINE

## 2025-02-18 PROCEDURE — 1036F TOBACCO NON-USER: CPT | Performed by: INTERNAL MEDICINE

## 2025-02-18 PROCEDURE — 1123F ACP DISCUSS/DSCN MKR DOCD: CPT | Performed by: INTERNAL MEDICINE

## 2025-02-18 PROCEDURE — 1126F AMNT PAIN NOTED NONE PRSNT: CPT | Performed by: INTERNAL MEDICINE

## 2025-02-18 ASSESSMENT — ENCOUNTER SYMPTOMS
PSYCHIATRIC NEGATIVE: 1
FEVER: 0
COUGH: 0
CARDIOVASCULAR NEGATIVE: 1
GASTROINTESTINAL NEGATIVE: 1
CHILLS: 0
RESPIRATORY NEGATIVE: 1
NEUROLOGICAL NEGATIVE: 1

## 2025-02-18 ASSESSMENT — COLUMBIA-SUICIDE SEVERITY RATING SCALE - C-SSRS
2. HAVE YOU ACTUALLY HAD ANY THOUGHTS OF KILLING YOURSELF?: NO
1. IN THE PAST MONTH, HAVE YOU WISHED YOU WERE DEAD OR WISHED YOU COULD GO TO SLEEP AND NOT WAKE UP?: NO
6. HAVE YOU EVER DONE ANYTHING, STARTED TO DO ANYTHING, OR PREPARED TO DO ANYTHING TO END YOUR LIFE?: NO

## 2025-02-18 ASSESSMENT — PATIENT HEALTH QUESTIONNAIRE - PHQ9
SUM OF ALL RESPONSES TO PHQ9 QUESTIONS 1 AND 2: 0
2. FEELING DOWN, DEPRESSED OR HOPELESS: NOT AT ALL
1. LITTLE INTEREST OR PLEASURE IN DOING THINGS: NOT AT ALL

## 2025-02-18 ASSESSMENT — PAIN SCALES - GENERAL: PAINLEVEL_OUTOF10: 0-NO PAIN

## 2025-02-18 NOTE — ASSESSMENT & PLAN NOTE
Mild obstruction 10/2024.  Cont Proair. IgE+. Cont singulair. Cont Advair 100mcg bid. Cont Flonase.

## 2025-02-18 NOTE — ASSESSMENT & PLAN NOTE
on autobipap. Wears for 100% of nights for [id number] w/ AHI 1.1.  Patient uses and benefits from her BIPAP.  Will try to adjust to a short ramp to see if will be more comfortable.

## 2025-02-18 NOTE — PROGRESS NOTES
Subjective   Patient ID: Patty Johnson is a 75 y.o. female who presents for Lung Eval.  h/o asthma, CELIA on BIPAP here for f/u. Mild obstruction 10/2024. Received a new BIPAP.  Having some issues w/ her BIPAP in that she prefers no ramp but feels like she's holding her breath when she first puts it on.   No fevers, chills, cough. On Advair but not always regularly bid. On singulair. Has not needed rescue. ET is about 100feet.         Review of Systems   Constitutional:  Negative for chills and fever.   Respiratory: Negative.  Negative for cough.    Cardiovascular: Negative.    Gastrointestinal: Negative.    Neurological: Negative.    Psychiatric/Behavioral: Negative.     All other systems reviewed and are negative.      Objective   Physical Exam  Vitals reviewed.   Constitutional:       Appearance: Normal appearance.   HENT:      Head: Normocephalic and atraumatic.   Eyes:      Extraocular Movements: Extraocular movements intact.   Cardiovascular:      Rate and Rhythm: Normal rate and regular rhythm.      Heart sounds: Normal heart sounds.   Pulmonary:      Effort: Pulmonary effort is normal.      Breath sounds: Normal breath sounds.   Abdominal:      Palpations: Abdomen is soft.      Tenderness: There is no abdominal tenderness.   Musculoskeletal:      Cervical back: Normal range of motion.   Skin:     General: Skin is warm.   Neurological:      General: No focal deficit present.      Mental Status: She is alert and oriented to person, place, and time. Mental status is at baseline.   Psychiatric:         Mood and Affect: Mood normal.         Behavior: Behavior normal.         Assessment/Plan   Problem List Items Addressed This Visit       Asthma - Primary     Mild obstruction 10/2024.  Cont Proair. IgE+. Cont singulair. Cont Advair 100mcg bid. Cont Flonase.          CELIA (obstructive sleep apnea)     on autobipap. Wears for 100% of nights for [id number] w/ AHI 1.1.  Patient uses and benefits from her BIPAP.  Will try  to adjust to a short ramp to see if will be more comfortable.         Relevant Orders    Positive Airway Pressure (PAP) Therapy     RTC in 1 year.     Time Spent  Prep time on day of patient encounter: 5 minutes  Time spent directly with patient, family or caregiver: 10 minutes  Additional Time Spent on Patient Care Activities: 0 minutes  Documentation Time: 5 minutes  Other Time Spent: 0 minutes  Total: 20 minutes        Zack Diamond MD 02/18/25 12:52 PM

## 2025-02-19 ENCOUNTER — PHARMACY VISIT (OUTPATIENT)
Dept: PHARMACY | Facility: CLINIC | Age: 75
End: 2025-02-19
Payer: COMMERCIAL

## 2025-02-27 PROCEDURE — RXMED WILLOW AMBULATORY MEDICATION CHARGE

## 2025-02-28 ENCOUNTER — PHARMACY VISIT (OUTPATIENT)
Dept: PHARMACY | Facility: CLINIC | Age: 75
End: 2025-02-28
Payer: COMMERCIAL

## 2025-03-13 ENCOUNTER — APPOINTMENT (OUTPATIENT)
Dept: UROLOGY | Facility: CLINIC | Age: 75
End: 2025-03-13
Payer: MEDICARE

## 2025-03-13 DIAGNOSIS — N81.6 RECTOCELE: ICD-10-CM

## 2025-03-13 DIAGNOSIS — B37.31 VULVOVAGINAL CANDIDIASIS: Primary | ICD-10-CM

## 2025-03-13 DIAGNOSIS — N32.81 OAB (OVERACTIVE BLADDER): ICD-10-CM

## 2025-03-13 DIAGNOSIS — N39.3 SUI (STRESS URINARY INCONTINENCE, FEMALE): ICD-10-CM

## 2025-03-13 PROCEDURE — 1123F ACP DISCUSS/DSCN MKR DOCD: CPT | Performed by: STUDENT IN AN ORGANIZED HEALTH CARE EDUCATION/TRAINING PROGRAM

## 2025-03-13 PROCEDURE — 99214 OFFICE O/P EST MOD 30 MIN: CPT | Performed by: STUDENT IN AN ORGANIZED HEALTH CARE EDUCATION/TRAINING PROGRAM

## 2025-03-13 PROCEDURE — G2211 COMPLEX E/M VISIT ADD ON: HCPCS | Performed by: STUDENT IN AN ORGANIZED HEALTH CARE EDUCATION/TRAINING PROGRAM

## 2025-03-13 NOTE — PROGRESS NOTES
Virtual or Telephone Consent    An interactive audio and video telecommunication system which permits real time communications between the patient (at the originating site) and provider (at the distant site) was utilized to provide this telehealth service.   Verbal consent was requested and obtained from Patty Johnson on this date, 03/17/25 for a telehealth visit and the patient's location was confirmed at the time of the visit.    HISTORY OF PRESENT ILLNESS:  Patty Johnson is a 75 y.o. female who presents today for a virtual follow up visit. She does wake up every few hours to use the restroom. She would like to do another botox, but it depends on cost with her insurance.           Past Medical History  She has a past medical history of Personal history of other diseases of the musculoskeletal system and connective tissue and Personal history of other diseases of the respiratory system.    Surgical History  She has a past surgical history that includes Total hip arthroplasty (09/08/2018); Other surgical history (03/04/2020); Other surgical history (03/04/2020); Other surgical history (03/04/2020); Other surgical history (09/18/2018); CT angio abdomen pelvis w and or wo IV IV contrast (5/13/2014); CT angio abdomen pelvis w and or wo IV IV contrast (5/17/2018); CT angio abdomen pelvis w and or wo IV IV contrast (12/5/2019); CT angio abdomen pelvis w and or wo IV IV contrast (12/9/2016); and CT angio abdomen pelvis w and or wo IV IV contrast (8/14/2023).     Social History  She reports that she has never smoked. She has never used smokeless tobacco. No history on file for alcohol use and drug use.    Family History  Family History   Problem Relation Name Age of Onset    Stroke Mother          cerebrovascular accident (CVA)    Heart block Father      Esophageal cancer Brother      Cancer Other      Hypertension Other          Allergies  Benazepril, Hydroxychloroquine, Olmesartan, Sulfamethoxazole-trimethoprim,  and Vancomycin      A comprehensive 10+ review of systems was negative except for: see hpi                  Assessment:  74-year-old with mixed incontinence, urgency dominant     JOHN  -Has failed Vesicare and Myrbetriq and Kegel's, bladder training exercises and fluid management  -UDS demonstrates detrusor overactivity  -failed SNM in 2022  -s/p botox 12/2/22, 7/5/23 with over 90% improvement, 100 units but was short lasting  -Botox 200 units 1/8/24, 5/9/24,  9/9/24, no improvement   -Tried fesoterodine  -And if symptoms do not improve and culture is negative we can consider other options like vivally, ptns or bluewind   -Rx gemtesa, doing well, in clinical pharmacy program   -will follow u4doarc, if after 12 months no change in symptoms, will likely not   -Sx returning, will do another botox         Vulvar discomfort:  -Continue lotrisone        Rectocele:   -Now having BM sx and pelvic pain will try smooth move tea first   -doing well no issues       Follow up for botox         All questions and concerns were answered and addressed.  The patient expressed understanding and agrees with the plan.     Rufino Vaughan MD    Scribe Attestation  By signing my name below, I, Yara Darden, Scribe   attest that this documentation has been prepared under the direction and in the presence of Rufino Vaughan MD.

## 2025-03-16 PROCEDURE — RXMED WILLOW AMBULATORY MEDICATION CHARGE

## 2025-03-19 DIAGNOSIS — E79.0 HYPERURICEMIA: ICD-10-CM

## 2025-03-20 DIAGNOSIS — E78.5 HYPERLIPIDEMIA, UNSPECIFIED HYPERLIPIDEMIA TYPE: ICD-10-CM

## 2025-03-20 DIAGNOSIS — I10 PRIMARY HYPERTENSION: ICD-10-CM

## 2025-03-20 DIAGNOSIS — K21.9 GASTROESOPHAGEAL REFLUX DISEASE, UNSPECIFIED WHETHER ESOPHAGITIS PRESENT: ICD-10-CM

## 2025-03-20 RX ORDER — OMEPRAZOLE 40 MG/1
CAPSULE, DELAYED RELEASE ORAL
Qty: 90 CAPSULE | Refills: 1 | Status: SHIPPED | OUTPATIENT
Start: 2025-03-20

## 2025-03-20 RX ORDER — ATENOLOL AND CHLORTHALIDONE TABLET 100; 25 MG/1; MG/1
1 TABLET ORAL DAILY
Qty: 90 TABLET | Refills: 1 | Status: SHIPPED | OUTPATIENT
Start: 2025-03-20

## 2025-03-20 RX ORDER — SIMVASTATIN 20 MG/1
TABLET, FILM COATED ORAL
Qty: 90 TABLET | Refills: 2 | Status: SHIPPED | OUTPATIENT
Start: 2025-03-20

## 2025-03-20 RX ORDER — ALLOPURINOL 100 MG/1
100 TABLET ORAL DAILY
Qty: 100 TABLET | Refills: 2 | Status: SHIPPED | OUTPATIENT
Start: 2025-03-20

## 2025-03-21 ENCOUNTER — PHARMACY VISIT (OUTPATIENT)
Dept: PHARMACY | Facility: CLINIC | Age: 75
End: 2025-03-21
Payer: COMMERCIAL

## 2025-04-01 ENCOUNTER — APPOINTMENT (OUTPATIENT)
Dept: PHARMACY | Facility: HOSPITAL | Age: 75
End: 2025-04-01
Payer: MEDICARE

## 2025-04-01 DIAGNOSIS — R32 URINARY INCONTINENCE, UNSPECIFIED TYPE: ICD-10-CM

## 2025-04-01 DIAGNOSIS — H04.123 DRY EYE SYNDROME OF BOTH EYES: ICD-10-CM

## 2025-04-01 DIAGNOSIS — J45.20 MILD INTERMITTENT ASTHMA WITHOUT COMPLICATION (HHS-HCC): ICD-10-CM

## 2025-04-01 RX ORDER — CYCLOSPORINE 0.5 MG/ML
1 EMULSION OPHTHALMIC 2 TIMES DAILY
Qty: 180 EACH | Refills: 1 | Status: SHIPPED | OUTPATIENT
Start: 2025-04-01

## 2025-04-01 RX ORDER — FLUTICASONE PROPIONATE AND SALMETEROL 100; 50 UG/1; UG/1
1 POWDER RESPIRATORY (INHALATION)
Qty: 180 EACH | Refills: 1 | Status: SHIPPED | OUTPATIENT
Start: 2025-04-01

## 2025-04-01 RX ORDER — VIBEGRON 75 MG/1
75 TABLET, FILM COATED ORAL DAILY
Qty: 90 TABLET | Refills: 1 | Status: SHIPPED | OUTPATIENT
Start: 2025-04-01

## 2025-04-01 NOTE — PROGRESS NOTES
Clinical Pharmacy Appointment    Patient ID: Patty Johnson is a 75 y.o. female who presents for Asthma, Dry Eye, and Overactive Bladder.    Pt is here for Follow Up appointment.     Referring Provider: Zack Diamond MD  Last visit with pulmonology: 2025  Next visit with pulmonology: 2026    Referring Provider: Rufino Vaughan MD  Last visit with urology: 3/13/2025  Next visit with urology: 2025    PCP: Husam Sherwood DO   Last visit with PCP: 2024   Next visit with PCP: 2025     Patient Assistance for Advair, Ventolin, Gemtesa, and Restasis approved through 10/17/2025. Will have to be renewed prior to that date to prevent lapse in coverage. Medication(s) will be received at no cost to patient from [facility].     INTERVAL HISTORY   Patient's last appointment with clinical pharmacy team on 2025  Recent hospitalizations? No   Medication changes? No   Missed doses of medications? Yes   Patient continues to miss doses of Restasis and Advair  Side effects? No  Updated relevant labs? No       Subjective     HPI  ASTHMA  Patient has been diagnosed with: Asthma, diagnosed as an adult  Following with pulmonologist: Yes  Smoking history: never been a smoker    Current Regimen  fluticasone/salmeterol 100-50 mcg, 1 puff twice daily  montelukast 10 mg daily  Ventolin (albuterol), 2 puffs every 6 hours as needed    Historical Treatment  albuterol - patient was not using,   Advair - stopped due to cost and patient did not feel it was helping    Inhaler Use/Technique  She is not using PRN albuterol  She endorses missed doses of fluticasone/salmeterol, but correct technique when using and rinsing mouth after    Asthma Severity  Symptoms/week: daily with exertion  Primary Symptoms: dyspnea, non-productive cough, and wheezing  Aggravating Factors: exercise, going up stairs  Alleviating Factors: rest  Nighttime awakenings: occasionally   RUFUS use: rarely  Interference with normal  activity: minor limitation    Pulmonary Function Tests from 10/7/2024  Indicate mild obstruction      Exacerbation History:  When was your last hospitalization for an exacerbation? None  When was the last time you were treated with antibiotics and/or steroids? Frequently treated with antibiotics for UTIs     Immunization History:  Influenza: up to date, received for this season  Pneumonia: up to date  COVID: recommended booster dose  RSV: up to date      URINARY SYMPTOMS  Following with urologist: Yes    Current Regimen  Gemtessa (vibegron) 75 mg daily    Historical Treatment  Botox  festoterodine  Myrbetriq  solifenacin    Medications that may contribute to symptoms  Diuretics - chlorthalidone  Calcium channel blockers (cause bladder to relax and affects emptying) - amlodipine    PMH Review  Narrow-angle glaucoma? No  Impaired gastric emptying? No  Urinary retention? No    Symptom Management  Protective Undergarments  Wears pads nightly, no recent leakage  No skin irritation or breakdown at this time  Bowel Movements  Last BM 2-3 days ago  Continues to use PRN docusate, Miralax, and/or senna for constipation      DRY EYES:    Last optometry exam: approximately 6 months ago, follows annually     Current medications:  Restasis - one drop in each eye twice daily     Historical medications:  Many other drops/gels for dry eyes in the past - patient does not recall specifics     Symptoms  Patient reports severe symptoms prior to Restasis treatment, including during sleep  She had to stop wearing contact lenses   Medications that may be contributing to symptoms: atenolol/chlorthalidone and omeprazole      Medication System Management  Patient's preferred pharmacy:  [facility] home delivery  Adherence/Organization: no concerns identified  Affordability/Accessibility: cost concerns with several medications  Currently enrolled in  Patient Assistance Program      Objective   Allergies   Allergen Reactions    Benazepril Cough     Hydroxychloroquine Unknown    Olmesartan Dizziness    Sulfamethoxazole-Trimethoprim Rash    Vancomycin Itching and Rash     Social History     Social History Narrative    Not on file      Medication Review  Current Outpatient Medications   Medication Instructions    albuterol (Ventolin HFA) 90 mcg/actuation inhaler 2 puffs, inhalation, Every 6 hours PRN    allopurinol (ZYLOPRIM) 100 mg, oral, Daily, as directed    amLODIPine (NORVASC) 10 mg, oral, Daily    aspirin 81 mg EC tablet 1 tablet, Daily    atenoloL-chlorthalidone (Tenoretic) 100-25 mg tablet 1 tablet, oral, Daily    cholecalciferol (Vitamin D-3) 5,000 Units tablet 1 tablet, Daily    cycloSPORINE (Restasis) 0.05 % ophthalmic emulsion 1 drop, Both Eyes, 2 times daily    fish oil concentrate (Omega-3) 120-180 mg capsule 2 g, 2 times daily before meals    fluticasone propion-salmeteroL (Advair Diskus) 100-50 mcg/dose diskus inhaler 1 puff, inhalation, 2 times daily RT, Rinse mouth with water after use to reduce aftertaste and incidence of candidiasis. Do not swallow.    Gemtesa 75 mg, oral, Daily    losartan (COZAAR) 100 mg, oral, Daily    milk thistle seed extract 175 mg capsule Milk Thistle 175 MG Oral Capsule   Refills: 0        Start : 18-Sep-2018   Active    miscellaneous medical supply (Blood Pressure Cuff) misc 1 each, miscellaneous, 2 times daily    montelukast (SINGULAIR) 10 mg, oral, Nightly    mv-min-folic acid-lutein (Essential Woman 50 Plus) 0.4-250 mg-mcg tablet 1 tablet, Daily    omeprazole (PriLOSEC) 40 mg DR capsule TAKE 1 CAPSULE BY MOUTH TWICE  DAILY    simvastatin (Zocor) 20 mg tablet TAKE 1 TABLET BY MOUTH IN THE  EVENING      Vitals  BP Readings from Last 2 Encounters:   02/18/25 124/75   02/04/25 143/58     BMI Readings from Last 1 Encounters:   02/18/25 38.53 kg/m²      Labs  A1C  Lab Results   Component Value Date    HGBA1C 5.6 02/29/2024    HGBA1C 5.8 07/01/2021    HGBA1C 5.3 11/30/2019     BMP  Lab Results   Component Value Date     CALCIUM 9.5 02/29/2024     02/29/2024    K 4.2 02/29/2024    CO2 21 02/29/2024     02/29/2024    BUN 35 (H) 02/29/2024    CREATININE 1.19 (H) 02/29/2024    EGFR 48 (L) 02/29/2024     LFTs  Lab Results   Component Value Date    ALT 17 02/29/2024    AST 26 02/29/2024    ALKPHOS 60 02/29/2024    BILITOT 0.7 02/29/2024     FLP  Lab Results   Component Value Date    TRIG 167 (H) 02/29/2024    CHOL 126 02/29/2024    LDLF 66 06/19/2023    LDLCALC 61 02/29/2024    HDL 31.4 02/29/2024     Urine Microalbumin  Lab Results   Component Value Date    MICROALBCREA 117.4 (H) 09/27/2022     Weight Management  Wt Readings from Last 3 Encounters:   02/18/25 108 kg (238 lb 11.2 oz)   02/04/25 107 kg (235 lb 0.2 oz)   12/27/24 109 kg (240 lb)      There is no height or weight on file to calculate BMI.     Assessment/Plan   Problem List Items Addressed This Visit       Asthma    Relevant Medications    fluticasone propion-salmeteroL (Advair Diskus) 100-50 mcg/dose diskus inhaler    Other Relevant Orders    Referral to Clinical Pharmacy    Urinary incontinence    Relevant Medications    vibegron (Gemtesa) 75 mg tablet    Other Relevant Orders    Referral to Clinical Pharmacy    Dry eye syndrome of both eyes    Relevant Medications    cycloSPORINE (Restasis) 0.05 % ophthalmic emulsion    Other Relevant Orders    Referral to Clinical Pharmacy       DISCUSSION/PLAN:  Patient with asthma currently following with pulmonology. She has not been taking Advair consistently - counseled on adherence and associating with daily routines. She does rinse her mouth after. She has not needed rescue inhaler. Will continue current regimen as asthma symptoms are stable. Follow up in 3 months or sooner if needed.    Patient also has overactive bladder currently following with urology. She denies missed doses of Gemtesa and continues to have improved control - minimal leaking and only wearing pads overnight or if leaving the house. She has been  getting up a little more overnight as Botox has likely worn off. Patient endorses some constipation - managing with PRNs. Will continue current regimen as symptoms are stable. Follow up in 3 months or sooner if needed.    Patient also has dry eyes currently treated with Restasis. She follows with eye doctor regularly and denies side effects or vision changes at this time. Will continue current regimen as symptoms are stable. Follow up in 3 months or sooner if needed.    CONTINUE ASTHMA MEDICATIONS  fluticasone/salmeterol 100-50 mcg, 1 puff twice daily  montelukast 10 mg once daily  Ventolin (albuterol), 2 puffs every 6 hours as needed    CONTINUE OVERACTIVE BLADDER MEDICATIONS  Gemtesa 75 mg once daily    CONTINUE DRY EYE MEDICATIONS  Restasis - one drop in each eye twice daily    Monitoring and Education:  Counseled patient on the mechanism of action, dosing, drug interactions, side effects, and monitoring of asthma medications, Gemtesa, and Restasis  Reviewed Advair dosing and administration, including rinsing mouth after  Answered all patient questions    Continue all meds under the continuation of care with the referring provider and clinical pharmacy team.    Clinical Pharmacist follow-up: July 1st, 2025 at 2:00 PM  Orders placed: Advair, Gemtesa, Restasis refills sent to [facility] for home delivery    Thank you,   Bernadette Bah, PharmD  Clinical Pharmacy Specialist, Primary Care   [phone]    Verbal consent to manage patient's drug therapy was obtained from the patient . Patient was informed she may decline to participate or withdraw from participation in pharmacy services at any time.

## 2025-04-02 PROCEDURE — RXMED WILLOW AMBULATORY MEDICATION CHARGE

## 2025-04-04 ENCOUNTER — PHARMACY VISIT (OUTPATIENT)
Dept: PHARMACY | Facility: CLINIC | Age: 75
End: 2025-04-04
Payer: COMMERCIAL

## 2025-04-14 PROCEDURE — RXMED WILLOW AMBULATORY MEDICATION CHARGE

## 2025-04-16 ENCOUNTER — PHARMACY VISIT (OUTPATIENT)
Dept: PHARMACY | Facility: CLINIC | Age: 75
End: 2025-04-16
Payer: COMMERCIAL

## 2025-04-17 ENCOUNTER — APPOINTMENT (OUTPATIENT)
Dept: UROLOGY | Facility: CLINIC | Age: 75
End: 2025-04-17
Payer: MEDICARE

## 2025-04-17 DIAGNOSIS — N32.81 OAB (OVERACTIVE BLADDER): ICD-10-CM

## 2025-04-17 DIAGNOSIS — Z79.2 PROPHYLACTIC ANTIBIOTIC: ICD-10-CM

## 2025-04-17 LAB
POC APPEARANCE, URINE: CLEAR
POC BILIRUBIN, URINE: NEGATIVE
POC BLOOD, URINE: NEGATIVE
POC COLOR, URINE: YELLOW
POC GLUCOSE, URINE: NEGATIVE MG/DL
POC KETONES, URINE: NEGATIVE MG/DL
POC LEUKOCYTES, URINE: NEGATIVE
POC NITRITE,URINE: NEGATIVE
POC PH, URINE: 5.5 PH
POC PROTEIN, URINE: NEGATIVE MG/DL
POC SPECIFIC GRAVITY, URINE: 1.02
POC UROBILINOGEN, URINE: 0.2 EU/DL

## 2025-04-17 PROCEDURE — 52287 CYSTOSCOPY CHEMODENERVATION: CPT | Performed by: STUDENT IN AN ORGANIZED HEALTH CARE EDUCATION/TRAINING PROGRAM

## 2025-04-17 RX ORDER — LIDOCAINE HYDROCHLORIDE 10 MG/ML
50 INJECTION, SOLUTION INFILTRATION; PERINEURAL ONCE
Status: COMPLETED | OUTPATIENT
Start: 2025-04-17 | End: 2025-04-17

## 2025-04-17 RX ORDER — NITROFURANTOIN 25; 75 MG/1; MG/1
100 CAPSULE ORAL 2 TIMES DAILY
Qty: 6 CAPSULE | Refills: 0 | Status: SHIPPED | OUTPATIENT
Start: 2025-04-17 | End: 2025-04-20

## 2025-04-17 RX ORDER — INDOMETHACIN 25 MG/1
10 CAPSULE ORAL ONCE
Status: COMPLETED | OUTPATIENT
Start: 2025-04-17 | End: 2025-04-17

## 2025-04-17 RX ADMIN — INDOMETHACIN 10 MEQ: 25 CAPSULE ORAL at 12:29

## 2025-04-17 RX ADMIN — LIDOCAINE HYDROCHLORIDE 50 ML: 10 INJECTION, SOLUTION INFILTRATION; PERINEURAL at 12:31

## 2025-04-17 NOTE — PROGRESS NOTES
"HISTORY OF PRESENT ILLNESS:  Patty Johnson is a 75 y.o. female who presents today for a botox injection.          Past Medical History  She has a past medical history of Personal history of other diseases of the musculoskeletal system and connective tissue and Personal history of other diseases of the respiratory system.    Surgical History  She has a past surgical history that includes Total hip arthroplasty (09/08/2018); Other surgical history (03/04/2020); Other surgical history (03/04/2020); Other surgical history (03/04/2020); Other surgical history (09/18/2018); CT angio abdomen pelvis w and or wo IV IV contrast (5/13/2014); CT angio abdomen pelvis w and or wo IV IV contrast (5/17/2018); CT angio abdomen pelvis w and or wo IV IV contrast (12/5/2019); CT angio abdomen pelvis w and or wo IV IV contrast (12/9/2016); and CT angio abdomen pelvis w and or wo IV IV contrast (8/14/2023).     Social History  She reports that she has never smoked. She has never used smokeless tobacco. No history on file for alcohol use and drug use.    Family History  Family History[1]     Allergies  Benazepril, Hydroxychloroquine, Olmesartan, Sulfamethoxazole-trimethoprim, and Vancomycin      A comprehensive 10+ review of systems was negative except for: see hpi                          PHYSICAL EXAMINATION:  BP Readings from Last 3 Encounters:   02/18/25 124/75   02/04/25 143/58   12/16/24 138/83      Wt Readings from Last 3 Encounters:   02/18/25 108 kg (238 lb 11.2 oz)   02/04/25 107 kg (235 lb 0.2 oz)   12/27/24 109 kg (240 lb)      BMI:   Estimated body mass index is 38.53 kg/m² as calculated from the following:    Height as of 12/27/24: 1.676 m (5' 6\").    Weight as of 2/18/25: 108 kg (238 lb 11.2 oz).  BSA:   Estimated body surface area is 2.24 meters squared as calculated from the following:    Height as of 12/27/24: 1.676 m (5' 6\").    Weight as of 2/18/25: 108 kg (238 lb 11.2 oz).  HEENT: Normocephalic, atraumatic, PER " EOMI, nonicteric, trachea normal, thyroid normal, oropharynx normal.  CARDIAC: regular rate & rhythm, S1 & S2 normal.  No heaves, thrills, gallops or murmurs.  LUNGS: Clear to auscultation, no spinal or CV tenderness.  EXTREMITIES: No evidence of cyanosis, clubbing or edema.       Botox Injection    Patty came today for Botox injection.  I reviewed with her the risks and benefits including ptosis, infection, and bleeding. She has no contraindications. She is on no antibiotics and has no neuromuscular disorders.  Pregnancy is not an issue.     She tolerated the procedure well.  The patient  will return to see me again in three to four months, earlier if there are any problems.     Patty understands the side effects and risks, as well as the necessity for continued treatment to maintain improvement.  Additional therapy may be necessary. Call if there are any problems. See diagram for injection sites and dosages. 200 units were used at a concentration of 10 units per 0.1 mL.         Assessment:  75-year-old with mixed incontinence, urgency dominant     JOHN  -Has failed Vesicare and Myrbetriq and toviaz and Kegel's, bladder training exercises and fluid management  -UDS demonstrates detrusor overactivity  -failed SNM in 2022  -s/p botox 12/2/22, 7/5/23 with over 90% improvement, 100 units but was short lasting  -Botox 200 units 1/8/24, 5/9/24,  9/9/24, 4/17/25-->100 units of a sample was used  -And if symptoms do not improve and culture is negative we can consider other options like vivally, ptns or bluewind   -Rx gemtesa, doing well, in clinical pharmacy program     -  Vulvar discomfort:  -Continue lotrisone        Rectocele:   -Now having BM sx and pelvic pain will try smooth move tea first   -doing well no issues       Follow up in June       All questions and concerns were answered and addressed.  The patient expressed understanding and agrees with the plan.     Rufino Vaughan MD    Scribe Attestation  By signing my  name below, I, Ansley Gibson   attest that this documentation has been prepared under the direction and in the presence of Rufino Vaughan MD.         [1]   Family History  Problem Relation Name Age of Onset    Stroke Mother          cerebrovascular accident (CVA)    Heart block Father      Esophageal cancer Brother      Cancer Other      Hypertension Other

## 2025-04-28 PROCEDURE — RXMED WILLOW AMBULATORY MEDICATION CHARGE

## 2025-04-30 LAB
BACTERIA UR CULT: NORMAL
CHOLEST SERPL-MCNC: 132 MG/DL
CHOLEST/HDLC SERPL: 3.6 (CALC)
HDLC SERPL-MCNC: 37 MG/DL
LDLC SERPL CALC-MCNC: 73 MG/DL (CALC)
NONHDLC SERPL-MCNC: 95 MG/DL (CALC)
TRIGL SERPL-MCNC: 137 MG/DL

## 2025-05-01 LAB
25(OH)D3+25(OH)D2 SERPL-MCNC: 80 NG/ML (ref 30–100)
ALBUMIN SERPL-MCNC: 4.7 G/DL (ref 3.6–5.1)
ALBUMIN/CREAT UR: 114 MG/G CREAT
BUN SERPL-MCNC: 32 MG/DL (ref 7–25)
BUN/CREAT SERPL: 27 (CALC) (ref 6–22)
CALCIUM SERPL-MCNC: 9.9 MG/DL (ref 8.6–10.4)
CHLORIDE SERPL-SCNC: 104 MMOL/L (ref 98–110)
CO2 SERPL-SCNC: 28 MMOL/L (ref 20–32)
CREAT SERPL-MCNC: 1.18 MG/DL (ref 0.6–1)
CREAT UR-MCNC: 73 MG/DL (ref 20–275)
EGFRCR SERPLBLD CKD-EPI 2021: 48 ML/MIN/1.73M2
ERYTHROCYTE [DISTWIDTH] IN BLOOD BY AUTOMATED COUNT: 13.7 % (ref 11–15)
FERRITIN SERPL-MCNC: 73 NG/ML (ref 16–288)
GLUCOSE SERPL-MCNC: 112 MG/DL (ref 65–99)
HCT VFR BLD AUTO: 34.8 % (ref 35–45)
HGB BLD-MCNC: 11.8 G/DL (ref 11.7–15.5)
IRON SATN MFR SERPL: 26 % (CALC) (ref 16–45)
IRON SERPL-MCNC: 89 MCG/DL (ref 45–160)
MCH RBC QN AUTO: 34.5 PG (ref 27–33)
MCHC RBC AUTO-ENTMCNC: 33.9 G/DL (ref 32–36)
MCV RBC AUTO: 101.8 FL (ref 80–100)
MICROALBUMIN UR-MCNC: 8.3 MG/DL
PHOSPHATE SERPL-MCNC: 4.3 MG/DL (ref 2.1–4.3)
PLATELET # BLD AUTO: 166 THOUSAND/UL (ref 140–400)
PMV BLD REES-ECKER: 11.6 FL (ref 7.5–12.5)
POTASSIUM SERPL-SCNC: 4.7 MMOL/L (ref 3.5–5.3)
PTH-INTACT SERPL-MCNC: 57 PG/ML (ref 16–77)
RBC # BLD AUTO: 3.42 MILLION/UL (ref 3.8–5.1)
SODIUM SERPL-SCNC: 139 MMOL/L (ref 135–146)
TIBC SERPL-MCNC: 337 MCG/DL (CALC) (ref 250–450)
WBC # BLD AUTO: 5.4 THOUSAND/UL (ref 3.8–10.8)

## 2025-05-05 ENCOUNTER — PHARMACY VISIT (OUTPATIENT)
Dept: PHARMACY | Facility: CLINIC | Age: 75
End: 2025-05-05
Payer: COMMERCIAL

## 2025-05-06 ENCOUNTER — APPOINTMENT (OUTPATIENT)
Dept: NEPHROLOGY | Facility: CLINIC | Age: 75
End: 2025-05-06
Payer: MEDICARE

## 2025-05-06 VITALS
TEMPERATURE: 97.3 F | DIASTOLIC BLOOD PRESSURE: 72 MMHG | BODY MASS INDEX: 36.74 KG/M2 | HEART RATE: 56 BPM | WEIGHT: 228.6 LBS | HEIGHT: 66 IN | SYSTOLIC BLOOD PRESSURE: 113 MMHG | OXYGEN SATURATION: 96 %

## 2025-05-06 DIAGNOSIS — R80.8 OTHER PROTEINURIA: ICD-10-CM

## 2025-05-06 DIAGNOSIS — N18.31 CHRONIC KIDNEY DISEASE, STAGE 3A (MULTI): Primary | ICD-10-CM

## 2025-05-06 DIAGNOSIS — I10 PRIMARY HYPERTENSION: ICD-10-CM

## 2025-05-06 PROCEDURE — 1123F ACP DISCUSS/DSCN MKR DOCD: CPT | Performed by: INTERNAL MEDICINE

## 2025-05-06 PROCEDURE — 1159F MED LIST DOCD IN RCRD: CPT | Performed by: INTERNAL MEDICINE

## 2025-05-06 PROCEDURE — G2211 COMPLEX E/M VISIT ADD ON: HCPCS | Performed by: INTERNAL MEDICINE

## 2025-05-06 PROCEDURE — 99215 OFFICE O/P EST HI 40 MIN: CPT | Performed by: INTERNAL MEDICINE

## 2025-05-06 PROCEDURE — 3074F SYST BP LT 130 MM HG: CPT | Performed by: INTERNAL MEDICINE

## 2025-05-06 PROCEDURE — 3078F DIAST BP <80 MM HG: CPT | Performed by: INTERNAL MEDICINE

## 2025-05-06 RX ORDER — DAPAGLIFLOZIN 10 MG/1
10 TABLET, FILM COATED ORAL DAILY
Qty: 90 TABLET | Refills: 3 | Status: SHIPPED | OUTPATIENT
Start: 2025-05-06 | End: 2026-05-06

## 2025-05-06 NOTE — PROGRESS NOTES
Subjective       Patty Johnson is a 75 y.o. female who has past medical history of  CKD3, CELIA, asthma, HTN presents for CKD follow up.     Presents today accompanied by .  Many concerns about generalized pain, muscle weakness, not able to get up of chair etc.  She is inquiring about safety of Celebrex-okay with weekly use no more than that.  Blood pressure today is accepted.  Repeat blood work showed stable serum creatinine 1.1 and GFR 48 consistent with baseline.  Continues to have protein leak in the urine spot test .  For some reason Farxiga/Jardiance were discontinued.  I discussed benefits and risks today.  Will resume.  We discussed toilet hygiene.  Annual follow-up        Prior notes  Last office visit was September 2022.  Has had a right shoulder injury currently pending repair.      Patient had recent blood work February 2024.  Currently taking Jardiance, however interested in switching to Farxiga as this was better covered with their insurance.      Patient wondering if it is possible to maintain closer laboratory follow up for kidneys.       Per prior notes  Elly came alone today. Her  is one of my patients. She is aware of chronic kidney disease. In summer 2022 she broke her shoulder. Her blood pressure is not in target and losartan was added/started around that time. She gets frequent x-ray on the right shoulder. Recently incidentally she was found to have a right pleural effusion. She has been to get repeat chest x-ray later today. She reports shortness of breath with exertion. She has chronic leg swelling that gets better with compression socks. She has chronic urine incontinence and follow-up closely with urology. She failed conventional therapy with plans for either Botox injection or bladder pacer. She had recurrent UTIs but currently she denies Laurion tract symptoms or pain or burning with urination. She reports significant history of NSAID use that was discontinued  "recently per her PCP     Social history: , non-smoker, no drugs  Family history: No kidney issues run in the family  Surgical history: Right hip replacement     Objective   /72 (BP Location: Left arm, Patient Position: Standing, BP Cuff Size: Large adult)   Pulse 56   Temp 36.3 °C (97.3 °F)   Ht 1.676 m (5' 6\")   Wt 104 kg (228 lb 9.6 oz)   SpO2 96%   BMI 36.90 kg/m²   Wt Readings from Last 3 Encounters:   05/06/25 104 kg (228 lb 9.6 oz)   02/18/25 108 kg (238 lb 11.2 oz)   02/04/25 107 kg (235 lb 0.2 oz)       Physical Exam    General appearance: no distress awake and alert on room air, euvolemic on exam  Eyes: non-icteric  HEENT: atrumatic head, PEERLA, moist mucosa  Skin: no apparent rash  Heart: NSR, S1, S2 normal, no murmur or gallop  Lungs: Symmetrical expansion,CTA bilat no wheezing/crackles  Abdomen: soft, nt/nd, obese  Extremities: no edema bilat.  Tender to palpation bilateral calves.   Neuro: No FND,asterixis, no focal deficits noticed        Review of Systems     Constitutional: no fever, no chills, no recent weight gain and no recent weight loss.   Eyes: no blurred vision and no diplopia.   ENT: no hearing loss, no earache, no sore throat, no swollen glands in the neck and no nasal discharge.   Cardiovascular: no chest pain, no palpitations and no lower extremity edema.   Respiratory: no shortness of breath, no chronic cough and no shortness of breath during exertion.   Gastrointestinal: no abdominal pain, no constipation, no heartburn, no vomiting, no bloody stools and no change in bowel movements.   Genitourinary: no dysuria and no hematuria.   Musculoskeletal: no arthralgias and no myalgias.   Skin: no rashes and no skin lesions.   Neurological: no headaches and no dizziness.   Psychiatric: no confusion, no depression and no anxiety.   Endocrine: no heat intolerance, no cold intolerance, appetite not increased, no thyroid disorder, no increased urinary frequency and no dry skin. "   Hematologic/Lymphatic: does not bleed easily and does not bruise easily.   All other systems have been reviewed and are negative for complaint.         Data Review        Results from last 7 days   Lab Units 04/30/25  1150   [facility] WBC AUTO [place]/uL 5.4   [facility] HEMOGLOBIN g/dL 11.8   [facility] HEMATOCRIT % 34.8*   [facility] PLATELETS AUTO [place]/uL 166            Lab Results   Component Value Date    URICACID 9.5 (H) 09/27/2022           Lab Results   Component Value Date    HGBA1C 5.6 02/29/2024           Results from last 7 days   Lab Units 04/30/25  1150   [facility] SODIUM mmol/L 139   [facility] POTASSIUM mmol/L 4.7   [facility] CHLORIDE mmol/L 104   [facility] CO2 mmol/L 28   [facility] BUN mg/dL 32*   [facility] GLUCOSE mg/dL 112*   [facility] CALCIUM mg/dL 9.9   [facility] EGFR mL/min/1.73m2 48*           ALBUMIN/CREATININE RATIO, RANDOM URINE   Date Value Ref Range Status   04/30/2025 114 (H) <30 mg/g creat Final     Comment:        The ADA defines abnormalities in albumin  excretion as follows:     Albuminuria Category        Result (mg/g creatinine)     Normal to Mildly increased   <30  Moderately increased            Severely increased           > OR = 300     The ADA recommends that at least two of three  specimens collected within a 3-6 month period be  abnormal before considering a patient to be  within a diagnostic category.       Albumin/Creatine Ratio   Date Value Ref Range Status   09/27/2022 117.4 (H) 0.0 - 30.0 ug/mg crt Final            RFP  Recent Labs     04/30/25  1150 02/29/24  1204 09/01/23  0807 08/30/23  0921 08/29/23  0517 08/16/23  0830 03/23/23  1310 09/27/22  1543    139 138 135* 133* 139 140 141   K 4.7 4.2 4.3 3.8 3.7 3.9 4.2 4.5    107 105 103 103 104 106 107   CO2 28 21 25 22 22 26 23 23   BUN 32* 35* 38* 24* 26* 29* 32* 37*   CREATININE 1.18* 1.19* 1.33* 1.33* 1.07* 1.10* 1.13* 1.28*   GLUCOSE 112* 108* 109* 160* 121* 142* 103* 115*   CALCIUM 9.9 9.5 9.2 8.7 8.7 9.2 9.8 9.5   PHOS 4.3  --  4.1  --   --   3.6 4.0 4.3   EGFR 48* 48*  --   --   --   --   --   --    ANIONGAP  --  15 12 14 12 13 15 16        Urineanalysis  Recent Labs     04/17/25  1117 09/09/24  1413 07/08/24  1711 05/09/24  1010 01/08/24  1110 08/16/23  0812 09/27/22  1543 06/09/22  1557 09/13/18  0539   COLORU Yellow Yellow Light-Yellow Yellow Yellow YELLOW YELLOW YELLOW YELLOW   APPEARANCEU Clear Clear Clear Clear Clear CLEAR HAZY CLEAR CLEAR   SPECGRAVU 1.020 >=1.030 1.016 1.020 1.020 1.017 1.014 1.017 1.010   JORDAN 5.5 6.0 5.5 5.5 5.5 6.0 5.0 5.0 5.0   PROTUR NEGATIVE NEGATIVE NEGATIVE NEGATIVE 30 (1+) NEGATIVE 30*  NEGATIVE NEGATIVE NEGATIVE   GLUCOSEU NEGATIVE NEGATIVE 300 (3+)* >=1000 (4+)* NEGATIVE NEGATIVE NEGATIVE NEGATIVE NEGATIVE   BLOODU NEGATIVE NEGATIVE NEGATIVE NEGATIVE TRACE-Lysed* NEGATIVE SMALL(1+)* NEGATIVE NEGATIVE   KETONESU NEGATIVE NEGATIVE NEGATIVE NEGATIVE NEGATIVE NEGATIVE NEGATIVE NEGATIVE NEGATIVE   BILIRUBINU NEGATIVE NEGATIVE NEGATIVE NEGATIVE NEGATIVE NEGATIVE NEGATIVE NEGATIVE NEGATIVE   NITRITEU NEGATIVE NEGATIVE NEGATIVE NEGATIVE NEGATIVE NEGATIVE POSITIVE* NEGATIVE NEGATIVE   LEUKOCYTESU  --   --  25 Jadyn/µL*  --   --  SMALL(1+)* LARGE(3+)* MODERATE (2+)* NEGATIVE       Urine Electrolytes  Recent Labs     04/30/25  1150 04/17/25  1117 09/09/24  1413 07/08/24  1711 05/09/24  1010 01/08/24  1110 08/16/23  0812 09/27/22  1543 06/09/22  1557 09/13/18  0539   CREATU 73  --   --   --   --   --   --  82.6  82.6  --   --    PROTUR  --  NEGATIVE NEGATIVE NEGATIVE NEGATIVE 30 (1+) NEGATIVE 30*  NEGATIVE NEGATIVE NEGATIVE   UTPCR  --   --   --   --   --   --   --  0.36*  --   --    ALBUMINUR 8.3  --   --   --   --   --   --   --   --   --    MICROALBCREA 114*  --   --   --   --   --   --  117.4*  --   --         Urine Micro  Recent Labs     07/08/24  1711 08/16/23  0812 09/27/22  1543 06/09/22  1557   WBCU 1-5 7* 64* 18*   RBCU NONE 1 3 5   HYALCASTU  --   --   --  OCC*   SQUAMEPIU 1-9 (SPARSE) 1 2 5   BACTERIAU  --   --  1+*   --    MUCUSU FEW FEW FEW 1+        Iron  Recent Labs     04/30/25  1150 09/27/22  1543 06/07/22  1335   IRON 89 69 70   TIBC 337 324 370   IRONSAT 26 21* 19*   FERRITIN 73 75 46          Current Outpatient Medications on File Prior to Visit   Medication Sig Dispense Refill    albuterol (Ventolin HFA) 90 mcg/actuation inhaler Inhale 2 puffs every 6 hours if needed for wheezing or shortness of breath. 18 g 3    allopurinol (Zyloprim) 100 mg tablet TAKE 1 TABLET BY MOUTH DAILY AS  DIRECTED 100 tablet 2    amLODIPine (Norvasc) 10 mg tablet TAKE 1 TABLET BY MOUTH ONCE  DAILY 100 tablet 2    aspirin 81 mg EC tablet Take 1 tablet (81 mg) by mouth once daily.      atenoloL-chlorthalidone (Tenoretic) 100-25 mg tablet TAKE 1 TABLET BY MOUTH DAILY 90 tablet 1    cholecalciferol (Vitamin D-3) 5,000 Units tablet Take 1 tablet (5,000 Units) by mouth once daily.      cycloSPORINE (Restasis) 0.05 % ophthalmic emulsion Administer 1 drop into both eyes 2 times a day. 180 each 1    fish oil concentrate (Omega-3) 120-180 mg capsule Take 2 capsules (2 g) by mouth 2 times a day before meals.      fluticasone propion-salmeteroL (Advair Diskus) 100-50 mcg/dose diskus inhaler Inhale 1 puff 2 times a day. Rinse mouth with water after use to reduce aftertaste and incidence of candidiasis. Do not swallow. 180 each 1    losartan (Cozaar) 100 mg tablet TAKE 1 TABLET BY MOUTH ONCE  DAILY 100 tablet 2    milk thistle seed extract 175 mg capsule Milk Thistle 175 MG Oral Capsule   Refills: 0        Start : 18-Sep-2018   Active      miscellaneous medical supply (Blood Pressure Cuff) misc 1 each 2 times a day. 1 each 0    montelukast (Singulair) 10 mg tablet TAKE 1 TABLET BY MOUTH AT  BEDTIME 100 tablet 2    mv-min-folic acid-lutein (Essential Woman 50 Plus) 0.4-250 mg-mcg tablet Take 1 tablet by mouth once daily. Multi For Her 50+      omeprazole (PriLOSEC) 40 mg DR capsule TAKE 1 CAPSULE BY MOUTH TWICE  DAILY 90 capsule 1    simvastatin (Zocor) 20  mg tablet TAKE 1 TABLET BY MOUTH IN THE  EVENING 90 tablet 2    vibegron (Gemtesa) 75 mg tablet Take 1 tablet (75 mg) by mouth once daily. 90 tablet 1     Current Facility-Administered Medications on File Prior to Visit   Medication Dose Route Frequency Provider Last Rate Last Admin    onabotulinumtoxinA (Botox) injection 200 Units  200 Units intramuscular Once Rufino Vaughan MD               Assessment and Plan       Patty Johnson is a 75y.o. female who has past medical history of  CKD3, CELIA, asthma, HTN presents for CKD follow up.       # Chronic kidney disease - S5yD9--T4  - Baseline SCr 1-1.2 in 2022  - Most recent Scr 1.19, GFR 48 consistent with baseline  - Most likely related to atherosclerotic cardiovascular disease  - Spot test  mg/g-will resume SGL inhibitors.  She cannot remember what led to discontinuing  -Continue losartan 100 mg        # BP - today at office is in target  - Current meds: amlodipine 10 mg, atenolol-chlorthalidone 100-25, losartan 100 mg  - No Changes       #Anemia Hb 11.8  - Accepted iron storage    #(CKD)-MBD  - Within normal PTH, vitamin D, albumin, calcium    # CVS  - On statins-possible muscle weakness.  Continue ACE inhibitor's.  Reinstating SGL inhibitors    # No significant acidosis    #Others  - No NSAIDs, no contrast as possible. If to be done- we recommend holding ACEi/ARBS/diuretics 24 hrs prior to contrast exposure and ensure appropriate hydration   - Ensure well hydration  - Limit salt in diet  - No smoking    Patient received CKD education and counselling    Kidney function appears to be largely stable at this time.  Return to nephrology clinic in 1 year, sooner if needed.

## 2025-05-06 NOTE — PATIENT INSTRUCTIONS
Dear SUSAN   It was nice seeing you in the nephrology clinic today     Today we discussed the following:     #Chronic kidney disease stage III baseline 40-50%.  We are currently at your baseline 48%.. Please work on hydrating. Consume at least 50 ounces of fluid daily.  Avoid NSAIDs - this is a class of medications that can harm your kidneys - like Advil, Aleve, Ibuprofen, Motrin, Meloxicam        #Hypertension-continue same medication    # Protein leak in the urine-will resume Farxiga 10 mg.  We discussed toilet hygiene.  Please report any concerns    # New muscle weakness-discussed with Dr. Dc-possible related to Zocor?            Follow-up in 12 months with repeat blood work and urinalysis prior to next visit     Please call our office if you have any question  Thank you for coming to see me today     Roselia Barba MD, MS, LEANNE TALLEY  Clinical  - [employer]  Nephrologist - [facility] - [facility]

## 2025-05-06 NOTE — PROGRESS NOTES
Primary Care Physician: Husam Sherwood DO   Date of Visit: 05/13/2025 11:30 AM EDT  Type of Visit: Established      Chief Complaint:  No chief complaint on file.       HPI  Patty Johnson 75 y.o. female with a PMH of CKD3 (BL creat 1.2), HTN, asthma, HLD, CELIA on Bipap presents today for follow up. TTE 10/2022 showed LVEF 65-70%, severely dilated LA, and mildly elevated RVSP. NM stress test in 10/2018 showed no ischemia. EKG in the past has been normal.      She was seen in our office in 2/2025. At that time, she had increasing SOB on exertion and palpitations. Has had recent trouble with Bipap machine. TTE 2/11 showed LVEF 60-65% with grade 1 diastolic dysfunction. Zio 2/11-2/21 showed avg HR 56 bpm, 43 episodes of SVT, longest 20 beats with avg HR 92 bpm, fastest 12 beats max 146 bpm. Medications include amlopdine 10 mg daily, aspirin 81 mg daily, atenolol-chlorthalidone 100-25 mg daily, losartan 100 mg daily, simvastatin 20 mg daily.     She has had increasing LE weakness and arthralgias. She denies chest pain, palpitations, dyspnea, edema, lightheadedness, or syncope.     Review of Systems   Review of Systems   Constitutional:  Positive for fatigue.   Respiratory:  Negative for shortness of breath.    Cardiovascular:  Negative for palpitations.   Musculoskeletal:  Positive for arthralgias, back pain and gait problem.   All other systems reviewed and are negative.     12 points review of systems are negative expect for the above    Social History:  Social History     Socioeconomic History    Marital status:      Spouse name: Not on file    Number of children: Not on file    Years of education: Not on file    Highest education level: Not on file   Occupational History    Not on file   Tobacco Use    Smoking status: Never    Smokeless tobacco: Never   Vaping Use    Vaping status: Never Used   Substance and Sexual Activity    Alcohol use: Not on file    Drug use: Not on file    Sexual activity: Not on file  "  Other Topics Concern    Not on file   Social History Narrative    Not on file     Social Drivers of Health     Financial Resource Strain: Not on file   Food Insecurity: Not on file   Transportation Needs: Not on file   Physical Activity: Not on file   Stress: Not on file   Social Connections: Not on file   Intimate Partner Violence: Not on file   Housing Stability: Not on file        Past Medical History:  Medical History[1]    Past Surgical History:  Surgical History[2]    Family History:  Family History[3]     Objective:   Physical Exam  Vitals reviewed.   Constitutional:       Appearance: Normal appearance.   HENT:      Head: Normocephalic and atraumatic.   Neck:      Vascular: No carotid bruit or JVD.   Cardiovascular:      Rate and Rhythm: Normal rate and regular rhythm.      Pulses: Normal pulses.      Heart sounds: Normal heart sounds.   Pulmonary:      Effort: Pulmonary effort is normal.      Breath sounds: Normal breath sounds.   Chest:      Chest wall: No tenderness.   Abdominal:      General: Abdomen is flat. Bowel sounds are normal.      Palpations: Abdomen is soft.   Skin:     General: Skin is warm and dry.   Neurological:      General: No focal deficit present.      Mental Status: She is alert and oriented to person, place, and time. Mental status is at baseline.   Psychiatric:         Mood and Affect: Mood normal.         Behavior: Behavior normal.             12/16/2024     5:25 PM 12/27/2024     4:22 PM 2/4/2025    11:11 AM 2/18/2025    10:50 AM 5/6/2025    10:59 AM 5/6/2025    11:01 AM 5/13/2025    11:29 AM   Vitals   Systolic 138  143 124 139 113 144   Diastolic 83  58 75 76 72 78   BP Location     Left arm Left arm    Heart Rate 88   59 56  68   Temp     36.3 °C (97.3 °F)     Resp    16      Height  1.676 m (5' 6\")   1.676 m (5' 6\")     Weight (lb) 242 240 235.01 238.7 228.6  227.96   BMI 39.06 kg/m2 38.74 kg/m2 37.93 kg/m2 38.53 kg/m2 36.9 kg/m2  36.79 kg/m2   BSA (m2) 2.26 m2 2.25 m2 2.23 m2 " 2.24 m2 2.2 m2  2.19 m2   Visit Report Report  Report Report Report Report Report        Allergies:  Allergies[4]    Medications:  Current Outpatient Medications   Medication Instructions    albuterol (Ventolin HFA) 90 mcg/actuation inhaler 2 puffs, inhalation, Every 6 hours PRN    allopurinol (ZYLOPRIM) 100 mg, oral, Daily, as directed    amLODIPine (NORVASC) 10 mg, oral, Daily    aspirin 81 mg EC tablet 1 tablet, Daily    atenoloL-chlorthalidone (Tenoretic) 100-25 mg tablet 1 tablet, oral, Daily    cholecalciferol (Vitamin D-3) 5,000 Units tablet 1 tablet, Daily    cycloSPORINE (Restasis) 0.05 % ophthalmic emulsion 1 drop, Both Eyes, 2 times daily    dapagliflozin propanediol (FARXIGA) 10 mg, oral, Daily    fish oil concentrate (Omega-3) 120-180 mg capsule 2 g, 2 times daily before meals    fluticasone propion-salmeteroL (Advair Diskus) 100-50 mcg/dose diskus inhaler 1 puff, inhalation, 2 times daily RT, Rinse mouth with water after use to reduce aftertaste and incidence of candidiasis. Do not swallow.    Gemtesa 75 mg, oral, Daily    losartan (COZAAR) 100 mg, oral, Daily    milk thistle seed extract 175 mg capsule Milk Thistle 175 MG Oral Capsule   Refills: 0        Start : 18-Sep-2018   Active    miscellaneous medical supply (Blood Pressure Cuff) misc 1 each, miscellaneous, 2 times daily    montelukast (SINGULAIR) 10 mg, oral, Nightly    mv-min-folic acid-lutein (Essential Woman 50 Plus) 0.4-250 mg-mcg tablet 1 tablet, Daily    omeprazole (PriLOSEC) 40 mg DR capsule TAKE 1 CAPSULE BY MOUTH TWICE  DAILY    simvastatin (Zocor) 20 mg tablet TAKE 1 TABLET BY MOUTH IN THE  EVENING        Labs and Imaging:     Lab Results   Component Value Date    WBC 5.4 04/30/2025    HGB 11.8 04/30/2025    HCT 34.8 (L) 04/30/2025     04/30/2025    CHOL 132 04/30/2025    TRIG 137 04/30/2025    HDL 37 (L) 04/30/2025    ALT 17 02/29/2024    AST 26 02/29/2024     04/30/2025    K 4.7 04/30/2025     04/30/2025     CREATININE 1.18 (H) 04/30/2025    BUN 32 (H) 04/30/2025    CO2 28 04/30/2025    TSH 1.88 01/08/2021    INR 1.1 09/08/2018    HGBA1C 5.6 02/29/2024         Echocardiogram  2/11/2025  CONCLUSIONS:   1. The left ventricular systolic function is normal, with a visually estimated ejection fraction of 60-65%.   2. Spectral Doppler shows a Grade I (impaired relaxation pattern) of left ventricular diastolic filling with normal left atrial filling pressure.   3. There is normal right ventricular global systolic function.    Echocardiogram     10/10/2022   CONCLUSIONS:  1. Left ventricular systolic function is normal with a 65-70% estimated ejection fraction.  2. Spectral Doppler shows a pseudonormal pattern of left ventricular diastolic filling.  3. There is an elevated mean left atrial pressure.  4. The left atrium is severely dilated.  5. The right atrium is moderately dilated.  6. Mildly elevated RVSP.    Stress Testing: No results found for this or any previous visit from the past 1825 days.    Cardiac Catheterization: No results found for this or any previous visit from the past 1825 days.    Cardiac Scoring: No results found for this or any previous visit from the past 1825 days.    AAA : No results found for this or any previous visit from the past 1825 days.    OTHER: No results found for this or any previous visit from the past 1825 days.      The ASCVD Risk score (Nicole BURT, et al., 2019) failed to calculate for the following reasons:    Risk score cannot be calculated because patient has a medical history suggesting prior/existing ASCVD     Zio   2/11-2/21/2025      Assessment:   75 y.o. female with a PMH of CKD3 (BL creat 1.2), HTN, pSVT, asthma, HLD, CELIA on Bipap presents today for follow up. She denies CP, dyspnea, palpitations, lightheadedness or syncope. We discussed EP evaluation for pSVT, however she does not wish to proceed at this time. She continues to follow with nephrology.     1. Primary hypertension         2. Mixed hyperlipidemia        3. PSVT (paroxysmal supraventricular tachycardia) (CMS-HCC)             Plan:  -Continue aspirin 81 mg daily, simvastatin 20 mg daily  -Continue amlodipine 10 mg daily, losartan 100 mg daily, atenolol-chlorthalidone 100-25 mg daily  -Continue Farxiga 10 mg daily  -Follow up as needed  ____________________________________________________________  Adriano Valencia CNP  Cardiovascular Medicine   [facility]  [facility]    Lab review: I have personally reviewed the laboratory result(s) CBC, CMP, Lipid panel  Diagnostic review: I have personally reviewed the result(s) of the EKG, Echocardiogram, and Holter Monitor          [1]   Past Medical History:  Diagnosis Date    Personal history of other diseases of the musculoskeletal system and connective tissue     History of arthritis    Personal history of other diseases of the respiratory system     History of asthma   [2]   Past Surgical History:  Procedure Laterality Date    CT ABDOMEN PELVIS ANGIOGRAM W AND/OR WO IV CONTRAST  5/13/2014    CT ABDOMEN PELVIS ANGIOGRAM W AND/OR WO IV CONTRAST 5/13/2014 [facility]    CT ABDOMEN PELVIS ANGIOGRAM W AND/OR WO IV CONTRAST  5/17/2018    CT ABDOMEN PELVIS ANGIOGRAM W AND/OR WO IV CONTRAST 5/17/2018 [facility]    CT ABDOMEN PELVIS ANGIOGRAM W AND/OR WO IV CONTRAST  12/5/2019    CT ABDOMEN PELVIS ANGIOGRAM W AND/OR WO IV CONTRAST 12/5/2019 [facility]    CT ABDOMEN PELVIS ANGIOGRAM W AND/OR WO IV CONTRAST  12/9/2016    CT ABDOMEN PELVIS ANGIOGRAM W AND/OR WO IV CONTRAST 12/9/2016 [facility]    CT ABDOMEN PELVIS ANGIOGRAM W AND/OR WO IV CONTRAST  8/14/2023    CT ABDOMEN PELVIS ANGIOGRAM W AND/OR WO IV CONTRAST 8/14/2023 [facility] CT    OTHER SURGICAL HISTORY  03/04/2020    Cataract surgery    OTHER SURGICAL HISTORY  03/04/2020    Elbow surgery    OTHER SURGICAL HISTORY  03/04/2020    Tonsillectomy    OTHER SURGICAL HISTORY   09/18/2018    Esophageal Dilation    TOTAL HIP ARTHROPLASTY  09/08/2018    Total Hip Replacement   [3]   Family History  Problem Relation Name Age of Onset    Stroke Mother          cerebrovascular accident (CVA)    Heart block Father      Esophageal cancer Brother      Cancer Other      Hypertension Other     [4]   Allergies  Allergen Reactions    Benazepril Cough    Hydroxychloroquine Unknown    Olmesartan Dizziness    Sulfamethoxazole-Trimethoprim Rash    Vancomycin Itching and Rash

## 2025-05-07 ENCOUNTER — TELEPHONE (OUTPATIENT)
Dept: UROLOGY | Facility: CLINIC | Age: 75
End: 2025-05-07
Payer: MEDICARE

## 2025-05-07 NOTE — TELEPHONE ENCOUNTER
Attempted to contact patient regarding concerns for constipation. LVM for patient to return call to our office at her soonest convenience.

## 2025-05-08 ENCOUNTER — APPOINTMENT (OUTPATIENT)
Dept: NEPHROLOGY | Facility: CLINIC | Age: 75
End: 2025-05-08
Payer: MEDICARE

## 2025-05-08 NOTE — TELEPHONE ENCOUNTER
Attempted to return patient's call regarding constipation. LVM to return call to Dr. Vaughan's nurse line for further assistance.

## 2025-05-13 ENCOUNTER — OFFICE VISIT (OUTPATIENT)
Dept: CARDIOLOGY | Facility: HOSPITAL | Age: 75
End: 2025-05-13
Payer: MEDICARE

## 2025-05-13 VITALS
SYSTOLIC BLOOD PRESSURE: 144 MMHG | BODY MASS INDEX: 36.79 KG/M2 | OXYGEN SATURATION: 98 % | HEART RATE: 68 BPM | WEIGHT: 227.96 LBS | DIASTOLIC BLOOD PRESSURE: 78 MMHG

## 2025-05-13 DIAGNOSIS — I47.10 PSVT (PAROXYSMAL SUPRAVENTRICULAR TACHYCARDIA): ICD-10-CM

## 2025-05-13 DIAGNOSIS — E78.2 MIXED HYPERLIPIDEMIA: ICD-10-CM

## 2025-05-13 DIAGNOSIS — I10 PRIMARY HYPERTENSION: Primary | ICD-10-CM

## 2025-05-13 PROCEDURE — 1159F MED LIST DOCD IN RCRD: CPT | Performed by: NURSE PRACTITIONER

## 2025-05-13 PROCEDURE — 3077F SYST BP >= 140 MM HG: CPT | Performed by: NURSE PRACTITIONER

## 2025-05-13 PROCEDURE — 1036F TOBACCO NON-USER: CPT | Performed by: NURSE PRACTITIONER

## 2025-05-13 PROCEDURE — 99214 OFFICE O/P EST MOD 30 MIN: CPT | Performed by: NURSE PRACTITIONER

## 2025-05-13 PROCEDURE — 3078F DIAST BP <80 MM HG: CPT | Performed by: NURSE PRACTITIONER

## 2025-05-13 PROCEDURE — RXMED WILLOW AMBULATORY MEDICATION CHARGE

## 2025-05-13 ASSESSMENT — ENCOUNTER SYMPTOMS
SHORTNESS OF BREATH: 0
FATIGUE: 1
ARTHRALGIAS: 1
PALPITATIONS: 0
BACK PAIN: 1

## 2025-05-16 ENCOUNTER — PHARMACY VISIT (OUTPATIENT)
Dept: PHARMACY | Facility: CLINIC | Age: 75
End: 2025-05-16
Payer: COMMERCIAL

## 2025-05-22 ENCOUNTER — APPOINTMENT (OUTPATIENT)
Dept: UROLOGY | Facility: CLINIC | Age: 75
End: 2025-05-22
Payer: MEDICARE

## 2025-06-05 DIAGNOSIS — I10 PRIMARY HYPERTENSION: ICD-10-CM

## 2025-06-05 DIAGNOSIS — K21.9 GASTROESOPHAGEAL REFLUX DISEASE, UNSPECIFIED WHETHER ESOPHAGITIS PRESENT: ICD-10-CM

## 2025-06-06 RX ORDER — LOSARTAN POTASSIUM 100 MG/1
100 TABLET ORAL DAILY
Qty: 90 TABLET | Refills: 1 | Status: SHIPPED | OUTPATIENT
Start: 2025-06-06

## 2025-06-06 RX ORDER — OMEPRAZOLE 40 MG/1
40 CAPSULE, DELAYED RELEASE ORAL 2 TIMES DAILY
Qty: 180 CAPSULE | Refills: 1 | Status: SHIPPED | OUTPATIENT
Start: 2025-06-06

## 2025-06-06 RX ORDER — ATENOLOL AND CHLORTHALIDONE TABLET 100; 25 MG/1; MG/1
1 TABLET ORAL DAILY
Qty: 90 TABLET | Refills: 1 | Status: SHIPPED | OUTPATIENT
Start: 2025-06-06

## 2025-06-06 RX ORDER — AMLODIPINE BESYLATE 10 MG/1
10 TABLET ORAL DAILY
Qty: 90 TABLET | Refills: 1 | Status: SHIPPED | OUTPATIENT
Start: 2025-06-06

## 2025-06-12 PROCEDURE — RXMED WILLOW AMBULATORY MEDICATION CHARGE

## 2025-06-16 ENCOUNTER — APPOINTMENT (OUTPATIENT)
Dept: PRIMARY CARE | Facility: CLINIC | Age: 75
End: 2025-06-16
Payer: MEDICARE

## 2025-06-16 ENCOUNTER — PHARMACY VISIT (OUTPATIENT)
Dept: PHARMACY | Facility: CLINIC | Age: 75
End: 2025-06-16
Payer: COMMERCIAL

## 2025-06-20 ENCOUNTER — OFFICE VISIT (OUTPATIENT)
Dept: PRIMARY CARE | Facility: CLINIC | Age: 75
End: 2025-06-20
Payer: MEDICARE

## 2025-06-20 VITALS
SYSTOLIC BLOOD PRESSURE: 133 MMHG | DIASTOLIC BLOOD PRESSURE: 65 MMHG | BODY MASS INDEX: 36 KG/M2 | WEIGHT: 224 LBS | OXYGEN SATURATION: 96 % | HEART RATE: 53 BPM | HEIGHT: 66 IN

## 2025-06-20 DIAGNOSIS — K59.04 CHRONIC IDIOPATHIC CONSTIPATION: ICD-10-CM

## 2025-06-20 DIAGNOSIS — I10 PRIMARY HYPERTENSION: ICD-10-CM

## 2025-06-20 DIAGNOSIS — E78.5 HYPERLIPIDEMIA, UNSPECIFIED HYPERLIPIDEMIA TYPE: ICD-10-CM

## 2025-06-20 DIAGNOSIS — Z12.31 ENCOUNTER FOR SCREENING MAMMOGRAM FOR MALIGNANT NEOPLASM OF BREAST: ICD-10-CM

## 2025-06-20 DIAGNOSIS — Z00.00 ROUTINE GENERAL MEDICAL EXAMINATION AT HEALTH CARE FACILITY: Primary | ICD-10-CM

## 2025-06-20 PROBLEM — E66.01 OBESITY, MORBID (MULTI): Status: RESOLVED | Noted: 2024-03-01 | Resolved: 2025-06-20

## 2025-06-20 PROCEDURE — 3075F SYST BP GE 130 - 139MM HG: CPT | Performed by: INTERNAL MEDICINE

## 2025-06-20 PROCEDURE — 1170F FXNL STATUS ASSESSED: CPT | Performed by: INTERNAL MEDICINE

## 2025-06-20 PROCEDURE — 1159F MED LIST DOCD IN RCRD: CPT | Performed by: INTERNAL MEDICINE

## 2025-06-20 PROCEDURE — 1036F TOBACCO NON-USER: CPT | Performed by: INTERNAL MEDICINE

## 2025-06-20 PROCEDURE — 1124F ACP DISCUSS-NO DSCNMKR DOCD: CPT | Performed by: INTERNAL MEDICINE

## 2025-06-20 PROCEDURE — 99214 OFFICE O/P EST MOD 30 MIN: CPT | Performed by: INTERNAL MEDICINE

## 2025-06-20 PROCEDURE — 3078F DIAST BP <80 MM HG: CPT | Performed by: INTERNAL MEDICINE

## 2025-06-20 PROCEDURE — G0439 PPPS, SUBSEQ VISIT: HCPCS | Performed by: INTERNAL MEDICINE

## 2025-06-20 RX ORDER — FESOTERODINE FUMARATE 8 MG/1
1 TABLET, FILM COATED, EXTENDED RELEASE ORAL DAILY
COMMUNITY
Start: 2024-08-08

## 2025-06-20 ASSESSMENT — ACTIVITIES OF DAILY LIVING (ADL)
GROCERY_SHOPPING: INDEPENDENT
BATHING: INDEPENDENT
TAKING_MEDICATION: INDEPENDENT
DRESSING: INDEPENDENT
MANAGING_FINANCES: INDEPENDENT
DOING_HOUSEWORK: INDEPENDENT

## 2025-06-20 ASSESSMENT — ENCOUNTER SYMPTOMS
DEPRESSION: 0
LOSS OF SENSATION IN FEET: 0
OCCASIONAL FEELINGS OF UNSTEADINESS: 1

## 2025-06-20 NOTE — PATIENT INSTRUCTIONS
Thank you for your visit, please for next time:    - Stop taking aspirin  - use miralex once a day, 2 fibers and metamucil daily. If get loose stools stop miralex but continue fibers daily.  - please work with physical therapy  - call for medicine refills

## 2025-06-20 NOTE — PROGRESS NOTES
"Subjective   Patient ID: Patty Johnson is a 75 y.o. female who presents for Follow-up and Medicare Annual Wellness Visit Subsequent.    HPI     Overall doing well. Patient fell recently 3 times. Patient is using a walker. Patient received shot in hips in April. But stared feeling weak in legs. Patient is in transition between here and moving to [place]. Follows with orthopedics. Images of the hips ordered in [place]. Patient feels her legs giving up. Mostly when climbing stairs. Patient started farxiga again but started having some \"women issues\". She described her symptoms as UTI. She stopped taking farxiga/jardiance. Patient also has been having bowel problems, constipation. Goes only once a week. Tried fibers, miralax. Patient has bruises on hands.    Denied c/p,N,V,dizziness, LUTS.  BP today: 133/85    Review of Systems   All other systems reviewed and are negative.      Objective   Ht 1.676 m (5' 6\")   BMI 36.79 kg/m²     Physical Exam  Constitutional:       Appearance: Normal appearance.      Comments: Using rollator    HENT:      Head: Normocephalic and atraumatic.   Cardiovascular:      Rate and Rhythm: Normal rate and regular rhythm.      Heart sounds: Normal heart sounds. No murmur heard.     No gallop.   Pulmonary:      Effort: Pulmonary effort is normal. No respiratory distress.      Breath sounds: No wheezing or rales.   Skin:     General: Skin is warm and dry.      Findings: No rash.   Neurological:      Mental Status: She is alert and oriented to person, place, and time. Mental status is at baseline.   Psychiatric:         Mood and Affect: Mood normal.         Behavior: Behavior normal.         Assessment/Plan     #constipation:  - tried Fibers, sennakot, stool softeners, metamucil , all did not help  - goes once per week  - advised patient to use miralex and fiber daily, and to stop miralex if she has loose stool.    #Right carpal tunnel   -Seen by Dr. Moore and given steroid " injection     #Essential tremor   -Monitor for now     #Chest pain, SOB--improved  -Follows with cardiology  -TTE with DD  -Zio with 43 runs of SVT-declined EP referral       #HTN  -well controlled at home  -cont losartan 100mg daily, atenolol-chlorthalidone 100-25mg daily,   -cont norvasc 10 mg daily      #CKD stage 3a/b  -Cont losartan 100 mg daily, patient not taking jardiance/farxiga-> causing UTI  -limit salt intake  -follow up with nephrology    #Left hip osteoarthritis   #new left leg weakness  -left total hip replacement on 8/28  -follows with [facility] - Dr. Rodas   - has been setting up with new orthopedic physician in [place].   - imaging done in [place]  - patient is setting up with PT     #HLD  -cont simvastatin 20mg daily     #Mixed incontinence / urgency dominant  -follows with Dr. Vaughan  -botox 200 units 4/2025    #Rectocele  -following with PT / Dr. Vaughan     #Osteopenia  -2000u vit d daily     #Asthma  -follows with pulm  -cont singulair, flonase, proair     #CELIA  -compliant with [employer]     Influenza: 11/3/2021  Shingrex: x2  RSV: 2/19/24  PCV 13: x2  Prevnar 20: recommended  Mammo: 12/2024  Colorectal: 07/2015  Dexa: 12/24, repeat 10 years      Patient in process of moving to [place].     Albertina Baltazar M.D.   Internal Medicine, PGY 1

## 2025-07-01 ENCOUNTER — APPOINTMENT (OUTPATIENT)
Dept: PHARMACY | Facility: HOSPITAL | Age: 75
End: 2025-07-01
Payer: MEDICARE

## 2025-07-01 DIAGNOSIS — R32 URINARY INCONTINENCE, UNSPECIFIED TYPE: ICD-10-CM

## 2025-07-01 DIAGNOSIS — H04.123 DRY EYE SYNDROME OF BOTH EYES: ICD-10-CM

## 2025-07-01 DIAGNOSIS — J45.20 MILD INTERMITTENT ASTHMA WITHOUT COMPLICATION (HHS-HCC): ICD-10-CM

## 2025-07-01 NOTE — PROGRESS NOTES
Clinical Pharmacy Appointment    Patient ID: Patty Johnson is a 75 y.o. female who presents for Asthma, Dry Eye, and Overactive Bladder.    Pt is here for Follow Up appointment.     Referring Provider: Zack Diamond MD  Last visit with pulmonology: 2025  Next visit with pulmonology: 2026    Referring Provider: Rufino Vaughan MD  Last visit with urology: 2025  Next visit with urology: not yet scheduled    PCP: Husam Sherwood DO   Last visit with PCP: 2025  Next visit with PCP: not yet scheduled     Patient Assistance for Advair, Ventolin, Gemtesa, and Restasis approved through 10/17/2025. Will have to be renewed prior to that date to prevent lapse in coverage. Medication(s) will be received at no cost to patient from [facility].     INTERVAL HISTORY   Patient's last appointment with clinical pharmacy team on 2025  Recent hospitalizations? No   Medication changes? Yes  Patient is to restart Farxiga per nephrology, has been on and off this medication, not taking consistently  Missed doses of medications? Yes   Occasional missed doses of Restasis and Advair, but improved  Side effects? No  Updated relevant labs? Yes  Renal function stable      Subjective     HPI  ASTHMA  Patient has been diagnosed with: Asthma, diagnosed as an adult  Following with pulmonologist: Yes  Smoking history: never been a smoker    Current Regimen  fluticasone/salmeterol 100-50 mcg, 1 puff twice daily  montelukast 10 mg daily  Ventolin (albuterol), 2 puffs every 6 hours as needed    Historical Treatment  albuterol - patient was not using,   Advair - stopped due to cost and patient did not feel it was helping    Inhaler Use/Technique  She is not using PRN albuterol  She endorses missed doses of fluticasone/salmeterol, but correct technique when using and rinsing mouth after    Asthma Severity  Symptoms/week: daily with exertion  Primary Symptoms: dyspnea, non-productive cough, and  wheezing  Aggravating Factors: exercise, going up stairs  Alleviating Factors: rest  Nighttime awakenings: occasionally   RUFUS use: rarely  Interference with normal activity: minor limitation    Pulmonary Function Tests from 10/7/2024  Indicate mild obstruction      Exacerbation History:  When was your last hospitalization for an exacerbation? None  When was the last time you were treated with antibiotics and/or steroids? Frequently treated with antibiotics for UTIs     Immunization History:  Influenza: up to date, received for this season  Pneumonia: up to date  COVID: recommended booster dose  RSV: up to date      URINARY SYMPTOMS  Following with urologist: Yes    Current Regimen  Gemtessa (vibegron) 75 mg daily    Historical Treatment  Botox  festoterodine  Myrbetriq  solifenacin    Medications that may contribute to symptoms  Diuretics - chlorthalidone  Calcium channel blockers (cause bladder to relax and affects emptying) - amlodipine  Farxiga    PMH Review  Narrow-angle glaucoma? No  Impaired gastric emptying? No  Urinary retention? No    Symptom Management  Protective Undergarments  Wears pads nightly, no recent leakage  No skin irritation or breakdown at this time  Bowel Movementsc  Continues to use PRN docusate, Miralax, and/or senna for constipation      DRY EYES:    Last optometry exam: approximately 6 months ago, follows annually     Current medications:  Restasis - one drop in each eye twice daily     Historical medications:  Many other drops/gels for dry eyes in the past - patient does not recall specifics     Symptoms  Patient reports severe symptoms prior to Restasis treatment, including during sleep  She had to stop wearing contact lenses   Medications that may be contributing to symptoms: atenolol/chlorthalidone and omeprazole      Medication System Management  Patient's preferred pharmacy: [facility] delivery  Adherence/Organization: no concerns identified  Affordability/Accessibility: cost  concerns with several medications  Currently enrolled in  Patient Assistance Program      Objective   Allergies   Allergen Reactions    Benazepril Cough    Hydroxychloroquine Unknown    Olmesartan Dizziness    Sulfamethoxazole-Trimethoprim Rash    Vancomycin Itching and Rash     Social History     Social History Narrative    Not on file      Medication Review  Current Outpatient Medications   Medication Instructions    albuterol (Ventolin HFA) 90 mcg/actuation inhaler 2 puffs, inhalation, Every 6 hours PRN    allopurinol (ZYLOPRIM) 100 mg, oral, Daily, as directed    amLODIPine (NORVASC) 10 mg, oral, Daily    atenoloL-chlorthalidone (Tenoretic) 100-25 mg tablet 1 tablet, oral, Daily    cholecalciferol (Vitamin D-3) 5,000 Units tablet 1 tablet, Daily    cycloSPORINE (Restasis) 0.05 % ophthalmic emulsion 1 drop, Both Eyes, 2 times daily    dapagliflozin propanediol (FARXIGA) 10 mg, oral, Daily    fesoterodine 8 mg tablet extended release 24 hr 1 tablet, Daily    fish oil concentrate (Omega-3) 120-180 mg capsule 2 g, 2 times daily before meals    fluticasone propion-salmeteroL (Advair Diskus) 100-50 mcg/dose diskus inhaler 1 puff, inhalation, 2 times daily RT, Rinse mouth with water after use to reduce aftertaste and incidence of candidiasis. Do not swallow.    Gemtesa 75 mg, oral, Daily    losartan (COZAAR) 100 mg, oral, Daily    milk thistle seed extract 175 mg capsule Milk Thistle 175 MG Oral Capsule   Refills: 0        Start : 18-Sep-2018   Active    miscellaneous medical supply (Blood Pressure Cuff) misc 1 each, miscellaneous, 2 times daily    montelukast (SINGULAIR) 10 mg, oral, Nightly    mv-min-folic acid-lutein (Essential Woman 50 Plus) 0.4-250 mg-mcg tablet 1 tablet, Daily    omeprazole (PRILOSEC) 40 mg, oral, 2 times daily    simvastatin (Zocor) 20 mg tablet TAKE 1 TABLET BY MOUTH IN THE  EVENING      Vitals  BP Readings from Last 2 Encounters:   06/20/25 133/65   05/13/25 144/78     BMI Readings from Last  1 Encounters:   06/20/25 36.15 kg/m²      Labs  A1C  Lab Results   Component Value Date    HGBA1C 5.6 02/29/2024    HGBA1C 5.8 07/01/2021    HGBA1C 5.3 11/30/2019     BMP  Lab Results   Component Value Date    CALCIUM 9.9 04/30/2025     04/30/2025    K 4.7 04/30/2025    CO2 28 04/30/2025     04/30/2025    BUN 32 (H) 04/30/2025    CREATININE 1.18 (H) 04/30/2025    EGFR 48 (L) 04/30/2025     LFTs  Lab Results   Component Value Date    ALT 17 02/29/2024    AST 26 02/29/2024    ALKPHOS 60 02/29/2024    BILITOT 0.7 02/29/2024     FLP  Lab Results   Component Value Date    TRIG 137 04/30/2025    CHOL 132 04/30/2025    LDLF 66 06/19/2023    LDLCALC 73 04/30/2025    HDL 37 (L) 04/30/2025     Urine Microalbumin  Lab Results   Component Value Date    MICROALBCREA 114 (H) 04/30/2025     Weight Management  Wt Readings from Last 3 Encounters:   06/20/25 102 kg (224 lb)   05/13/25 103 kg (227 lb 15.3 oz)   05/06/25 104 kg (228 lb 9.6 oz)      There is no height or weight on file to calculate BMI.     Assessment/Plan   Problem List Items Addressed This Visit       Asthma    Relevant Orders    Referral to Clinical Pharmacy    Urinary incontinence    Relevant Orders    Referral to Clinical Pharmacy    Dry eye syndrome of both eyes    Relevant Orders    Referral to Clinical Pharmacy       DISCUSSION/PLAN:  Patient with asthma currently following with pulmonology. She has been doing better about taking Advair consistently and is using appropriate inhaler technique. Patient has not needed rescue inhaler and denies shortness of breath or wheezing at this time. Will continue current regimen as asthma symptoms are stable. Follow up in 3 months or sooner if needed.    Patient also has overactive bladder currently following with urology. She denies missed doses of Gemtesa and endorses good control during the day. She continues to have symptoms overnight and occasional constipation - managing with PRNs. Will continue current  regimen as symptoms are stable. Follow up in 3 months or sooner if needed.    Patient also has dry eyes currently treated with Restasis. She follows with eye doctor regularly and denies side effects or vision changes at this time. Will continue current regimen as symptoms are stable. Follow up in 3 months or sooner if needed.    Of note, patient is planning to move to [place] soon. She is aware that she will need to establish with new providers and will no longer be eligible for the  Patient Assistance Program.    CONTINUE ASTHMA MEDICATIONS  fluticasone/salmeterol 100-50 mcg, 1 puff twice daily  montelukast 10 mg once daily  Ventolin (albuterol), 2 puffs every 6 hours as needed    CONTINUE OVERACTIVE BLADDER MEDICATIONS  Gemtesa 75 mg once daily    CONTINUE DRY EYE MEDICATIONS  Restasis - one drop in each eye twice daily    Monitoring and Education:  Counseled patient on the mechanism of action, dosing, drug interactions, side effects, and monitoring of asthma medications, Gemtesa, and Restasis  Reviewed Advair dosing and administration, including rinsing mouth after  Answered all patient questions    Continue all meds under the continuation of care with the referring provider and clinical pharmacy team.    Clinical Pharmacist follow-up: September 30th, 2025 at 2:00 PM  Orders placed: none at this time    Thank you,   Bernadette Bah, PharmD  Clinical Pharmacy Specialist, Primary Care   [phone]    Verbal consent to manage patient's drug therapy was obtained from the patient . Patient was informed she may decline to participate or withdraw from participation in pharmacy services at any time.

## 2025-07-09 PROCEDURE — RXMED WILLOW AMBULATORY MEDICATION CHARGE

## 2025-07-11 ENCOUNTER — PHARMACY VISIT (OUTPATIENT)
Dept: PHARMACY | Facility: CLINIC | Age: 75
End: 2025-07-11
Payer: COMMERCIAL

## 2025-07-14 PROCEDURE — RXMED WILLOW AMBULATORY MEDICATION CHARGE

## 2025-07-16 ENCOUNTER — PHARMACY VISIT (OUTPATIENT)
Dept: PHARMACY | Facility: CLINIC | Age: 75
End: 2025-07-16
Payer: COMMERCIAL

## 2025-07-21 NOTE — PROGRESS NOTES
Virtual or Telephone Consent    While technically available, the patient was unable or unwilling to consent to connect via audio/video telehealth technology; therefore, I performed this visit using a real-time audio only connection between Patty Johnson & Rufino Vaughan MD.  Verbal consent was requested and obtained from Patty Johnson on this date, 07/25/25 for a telehealth visit and the patient's location was confirmed at the time of the visit.    HISTORY OF PRESENT ILLNESS:  Patty Johnson is a 75 y.o. female who presents today for a virtual follow up visit. She reports that she does wake up every 2 hours to void and is not getting much sleep. She recently had a fall and is in physical therapy. She is on the gemtesa. She was doing well until a few weeks ago.           Past Medical History  She has a past medical history of Personal history of other diseases of the musculoskeletal system and connective tissue and Personal history of other diseases of the respiratory system.    Surgical History  She has a past surgical history that includes Total hip arthroplasty (09/08/2018); Other surgical history (03/04/2020); Other surgical history (03/04/2020); Other surgical history (03/04/2020); Other surgical history (09/18/2018); CT angio abdomen pelvis w and or wo IV IV contrast (5/13/2014); CT angio abdomen pelvis w and or wo IV IV contrast (5/17/2018); CT angio abdomen pelvis w and or wo IV IV contrast (12/5/2019); CT angio abdomen pelvis w and or wo IV IV contrast (12/9/2016); and CT angio abdomen pelvis w and or wo IV IV contrast (8/14/2023).     Social History  She reports that she has never smoked. She has never used smokeless tobacco. No history on file for alcohol use and drug use.    Family History  Family History[1]     Allergies  Benazepril, Hydroxychloroquine, Olmesartan, Sulfamethoxazole-trimethoprim, and Vancomycin      A comprehensive 10+ review of systems was negative except for: see hpi                   Assessment:  75-year-old with mixed incontinence, urgency dominant     JOHN  -Has failed Vesicare and Myrbetriq and toviaz and Kegel's, bladder training exercises and fluid management  -UDS demonstrates detrusor overactivity  -failed SNM in 2022  -s/p botox 12/2/22, 7/5/23 with over 90% improvement, 100 units but was short lasting  -Botox 200 units 1/8/24, 5/9/24,  9/9/24, 4/17/25-->100 units of a sample was used  -And if symptoms do not improve and culture is negative we can consider other options like vivally, ptns or bluewind   -Rx gemtesa, in clinical pharmacy program   -Rx trospium through the clinical pharmacy   -Symptoms returning, discusses botox vs tibial nerve stimulation       Vulvar discomfort:  -Continue lotrisone        Rectocele:   -Now having BM sx and pelvic pain will try smooth move tea first   -doing well no issues       Follow up 3 months       I spent a total of 20 minutes speaking with the patient on the telephone     All questions and concerns were answered and addressed.  The patient expressed understanding and agrees with the plan.     Rufino Vaughan MD    Scribe Attestation  By signing my name below, I, Yara Darden, Scribemili   attest that this documentation has been prepared under the direction and in the presence of Rufino Vaughan MD.         [1]   Family History  Problem Relation Name Age of Onset    Stroke Mother          cerebrovascular accident (CVA)    Heart block Father      Esophageal cancer Brother      Cancer Other      Hypertension Other

## 2025-07-24 ENCOUNTER — APPOINTMENT (OUTPATIENT)
Dept: UROLOGY | Facility: CLINIC | Age: 75
End: 2025-07-24
Payer: MEDICARE

## 2025-07-24 DIAGNOSIS — N32.81 OAB (OVERACTIVE BLADDER): Primary | ICD-10-CM

## 2025-07-24 PROCEDURE — 99213 OFFICE O/P EST LOW 20 MIN: CPT | Performed by: STUDENT IN AN ORGANIZED HEALTH CARE EDUCATION/TRAINING PROGRAM

## 2025-07-28 PROCEDURE — RXMED WILLOW AMBULATORY MEDICATION CHARGE

## 2025-07-30 ENCOUNTER — PHARMACY VISIT (OUTPATIENT)
Dept: PHARMACY | Facility: CLINIC | Age: 75
End: 2025-07-30
Payer: COMMERCIAL

## 2025-08-01 DIAGNOSIS — K21.9 GASTROESOPHAGEAL REFLUX DISEASE, UNSPECIFIED WHETHER ESOPHAGITIS PRESENT: ICD-10-CM

## 2025-08-01 DIAGNOSIS — I10 PRIMARY HYPERTENSION: ICD-10-CM

## 2025-08-01 DIAGNOSIS — J45.909 ASTHMA, UNSPECIFIED ASTHMA SEVERITY, UNSPECIFIED WHETHER COMPLICATED, UNSPECIFIED WHETHER PERSISTENT (HHS-HCC): ICD-10-CM

## 2025-08-01 DIAGNOSIS — E78.5 HYPERLIPIDEMIA, UNSPECIFIED HYPERLIPIDEMIA TYPE: ICD-10-CM

## 2025-08-03 RX ORDER — LOSARTAN POTASSIUM 100 MG/1
100 TABLET ORAL DAILY
Qty: 90 TABLET | Refills: 1 | Status: SHIPPED | OUTPATIENT
Start: 2025-08-03

## 2025-08-03 RX ORDER — OMEPRAZOLE 40 MG/1
40 CAPSULE, DELAYED RELEASE ORAL 2 TIMES DAILY
Qty: 90 CAPSULE | Refills: 1 | Status: SHIPPED | OUTPATIENT
Start: 2025-08-03

## 2025-08-03 RX ORDER — AMLODIPINE BESYLATE 10 MG/1
10 TABLET ORAL DAILY
Qty: 90 TABLET | Refills: 1 | Status: SHIPPED | OUTPATIENT
Start: 2025-08-03

## 2025-08-03 RX ORDER — SIMVASTATIN 20 MG/1
20 TABLET, FILM COATED ORAL NIGHTLY
Qty: 90 TABLET | Refills: 1 | Status: SHIPPED | OUTPATIENT
Start: 2025-08-03

## 2025-08-03 RX ORDER — ATENOLOL AND CHLORTHALIDONE TABLET 100; 25 MG/1; MG/1
1 TABLET ORAL DAILY
Qty: 90 TABLET | Refills: 1 | Status: SHIPPED | OUTPATIENT
Start: 2025-08-03

## 2025-08-04 ENCOUNTER — TELEPHONE (OUTPATIENT)
Dept: PHARMACY | Facility: HOSPITAL | Age: 75
End: 2025-08-04
Payer: MEDICARE

## 2025-08-04 NOTE — TELEPHONE ENCOUNTER
Clinical Pharmacy Phone Call    Returned call to patient regarding medications and eligibility for  Patient Assistance Program. All questions answered. Follow up as planned.    Thank you,   Bernadette Bah, PharmD  Clinical Pharmacy Specialist, Primary Care   [phone]

## 2025-08-05 DIAGNOSIS — N32.81 OAB (OVERACTIVE BLADDER): Primary | ICD-10-CM

## 2025-08-06 DIAGNOSIS — N32.81 OAB (OVERACTIVE BLADDER): Primary | ICD-10-CM

## 2025-08-08 PROCEDURE — RXMED WILLOW AMBULATORY MEDICATION CHARGE

## 2025-08-14 ENCOUNTER — PHARMACY VISIT (OUTPATIENT)
Dept: PHARMACY | Facility: CLINIC | Age: 75
End: 2025-08-14
Payer: COMMERCIAL

## 2025-08-14 DIAGNOSIS — J45.909 ASTHMA, UNSPECIFIED ASTHMA SEVERITY, UNSPECIFIED WHETHER COMPLICATED, UNSPECIFIED WHETHER PERSISTENT (HHS-HCC): ICD-10-CM

## 2025-08-14 RX ORDER — MONTELUKAST SODIUM 10 MG/1
10 TABLET ORAL NIGHTLY
Qty: 100 TABLET | Refills: 11 | Status: SHIPPED | OUTPATIENT
Start: 2025-08-14 | End: 2025-08-14 | Stop reason: SDUPTHER

## 2025-08-14 RX ORDER — MONTELUKAST SODIUM 10 MG/1
10 TABLET ORAL NIGHTLY
Qty: 100 TABLET | Refills: 11 | Status: SHIPPED | OUTPATIENT
Start: 2025-08-14

## 2025-08-19 ENCOUNTER — PHARMACY VISIT (OUTPATIENT)
Dept: PHARMACY | Facility: CLINIC | Age: 75
End: 2025-08-19
Payer: COMMERCIAL

## 2025-08-19 ENCOUNTER — APPOINTMENT (OUTPATIENT)
Dept: PHARMACY | Facility: HOSPITAL | Age: 75
End: 2025-08-19
Payer: MEDICARE

## 2025-08-19 DIAGNOSIS — N32.81 OAB (OVERACTIVE BLADDER): ICD-10-CM

## 2025-08-19 PROCEDURE — RXMED WILLOW AMBULATORY MEDICATION CHARGE

## 2025-08-19 RX ORDER — TROSPIUM CHLORIDE 20 MG/1
20 TABLET, FILM COATED ORAL 2 TIMES DAILY
Qty: 180 TABLET | Refills: 3 | Status: SHIPPED | OUTPATIENT
Start: 2025-08-19 | End: 2026-08-19

## 2025-08-19 RX ORDER — TROSPIUM CHLORIDE 20 MG/1
20 TABLET, FILM COATED ORAL 2 TIMES DAILY
Qty: 180 TABLET | Refills: 3 | Status: SHIPPED | OUTPATIENT
Start: 2025-08-19 | End: 2025-08-19

## 2025-08-19 RX ORDER — PSYLLIUM HUSK 0.4 G
4 CAPSULE ORAL NIGHTLY
COMMUNITY

## 2025-08-21 DIAGNOSIS — I72.8 SPLENIC ARTERY ANEURYSM: Primary | ICD-10-CM

## 2025-08-28 ENCOUNTER — APPOINTMENT (OUTPATIENT)
Dept: UROLOGY | Facility: CLINIC | Age: 75
End: 2025-08-28
Payer: MEDICARE

## 2025-08-29 DIAGNOSIS — I10 HYPERTENSION: Primary | ICD-10-CM

## 2025-09-02 ENCOUNTER — LAB REQUISITION (OUTPATIENT)
Dept: LAB | Facility: HOSPITAL | Age: 75
End: 2025-09-02

## 2025-09-02 ENCOUNTER — APPOINTMENT (OUTPATIENT)
Dept: RADIOLOGY | Facility: HOSPITAL | Age: 75
End: 2025-09-02
Payer: MEDICARE

## 2025-09-02 DIAGNOSIS — I10 ESSENTIAL (PRIMARY) HYPERTENSION: ICD-10-CM

## 2025-09-02 LAB
CREAT SERPL-MCNC: 1.44 MG/DL (ref 0.5–1.05)
EGFRCR SERPLBLD CKD-EPI 2021: 38 ML/MIN/1.73M*2

## 2025-09-30 ENCOUNTER — APPOINTMENT (OUTPATIENT)
Dept: PHARMACY | Facility: HOSPITAL | Age: 75
End: 2025-09-30
Payer: MEDICARE

## 2025-10-10 ENCOUNTER — APPOINTMENT (OUTPATIENT)
Dept: PULMONOLOGY | Facility: CLINIC | Age: 75
End: 2025-10-10
Payer: MEDICARE

## 2025-10-14 ENCOUNTER — APPOINTMENT (OUTPATIENT)
Dept: PHARMACY | Facility: HOSPITAL | Age: 75
End: 2025-10-14
Payer: MEDICARE

## 2026-05-12 ENCOUNTER — APPOINTMENT (OUTPATIENT)
Dept: NEPHROLOGY | Facility: CLINIC | Age: 76
End: 2026-05-12
Payer: MEDICARE